# Patient Record
Sex: MALE | Race: WHITE | NOT HISPANIC OR LATINO | Employment: OTHER | URBAN - METROPOLITAN AREA
[De-identification: names, ages, dates, MRNs, and addresses within clinical notes are randomized per-mention and may not be internally consistent; named-entity substitution may affect disease eponyms.]

---

## 2017-01-03 ENCOUNTER — OFFICE VISIT (OUTPATIENT)
Dept: UROLOGY | Facility: CLINIC | Age: 82
End: 2017-01-03
Payer: MEDICARE

## 2017-01-03 VITALS — HEIGHT: 66 IN | WEIGHT: 200 LBS | BODY MASS INDEX: 32.14 KG/M2

## 2017-01-03 DIAGNOSIS — N30.00 ACUTE CYSTITIS WITHOUT HEMATURIA: ICD-10-CM

## 2017-01-03 DIAGNOSIS — D49.4 BLADDER TUMOR: Primary | ICD-10-CM

## 2017-01-03 LAB
BACTERIA #/AREA URNS AUTO: ABNORMAL /HPF
BILIRUB UR QL STRIP: NEGATIVE
CLARITY UR REFRACT.AUTO: CLEAR
COLOR UR AUTO: YELLOW
GLUCOSE UR QL STRIP: NEGATIVE
HGB UR QL STRIP: ABNORMAL
HYALINE CASTS UR QL AUTO: 1 /LPF
KETONES UR QL STRIP: NEGATIVE
LEUKOCYTE ESTERASE UR QL STRIP: ABNORMAL
MICROSCOPIC COMMENT: ABNORMAL
NITRITE UR QL STRIP: NEGATIVE
PH UR STRIP: 6 [PH] (ref 5–8)
PROT UR QL STRIP: NEGATIVE
RBC #/AREA URNS AUTO: 2 /HPF (ref 0–4)
SP GR UR STRIP: 1.01 (ref 1–1.03)
SQUAMOUS #/AREA URNS AUTO: 1 /HPF
URN SPEC COLLECT METH UR: ABNORMAL
UROBILINOGEN UR STRIP-ACNC: NEGATIVE EU/DL
WBC #/AREA URNS AUTO: 37 /HPF (ref 0–5)

## 2017-01-03 PROCEDURE — 87086 URINE CULTURE/COLONY COUNT: CPT

## 2017-01-03 PROCEDURE — 81001 URINALYSIS AUTO W/SCOPE: CPT

## 2017-01-03 PROCEDURE — 99213 OFFICE O/P EST LOW 20 MIN: CPT | Mod: PBBFAC,PO | Performed by: UROLOGY

## 2017-01-03 PROCEDURE — 99999 PR PBB SHADOW E&M-EST. PATIENT-LVL III: CPT | Mod: PBBFAC,,, | Performed by: UROLOGY

## 2017-01-03 PROCEDURE — 99214 OFFICE O/P EST MOD 30 MIN: CPT | Mod: S$PBB,,, | Performed by: UROLOGY

## 2017-01-03 NOTE — PROGRESS NOTES
Subjective:       Patient ID: Bjorn Gupta is a 89 y.o. male.    Chief Complaint: Hospital Follow Up    HPI   Patient is a 89 y.o. male who is established to our clinic and was initially referred by their urologist Dr. Saucedo for evaluation of bladder cancer.     The patient underwent transurethral resection of the bladder tumor by Dr. Saucedo on November 22, 2016.  Pathology from that was again reviewed with the patient.  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  The patient was initially counseled on the fact that he is intermediate risk and based on current guidelines for non-muscle invasive bladder cancer, he would best be treated with repeat resection given that T1 stage of his initial resection pathology.    However, given the patient's age and a very recent event of sepsis requiring IV and subsequently long course of oral antibiotics, the family has elected to forego repeat resection.  In addition, they are not interested in an induction course of BCG.  They understand that this is against guideline recommendations and that this may predispose him to recurrence or progression of his cancer.      Review of Systems    +recent UTI, currently -fevers    Objective:      Physical Exam   Constitutional: He is oriented to person, place, and time. He appears well-developed and well-nourished. No distress.   HENT:   Head: Normocephalic and atraumatic.   Eyes: No scleral icterus.   Neck: No tracheal deviation present.   Pulmonary/Chest: Effort normal. No respiratory distress.   Neurological: He is alert and oriented to person, place, and time.   Psychiatric: He has a normal mood and affect. His behavior is normal. Judgment and thought content normal.         Assessment:       1. Bladder tumor    2. Acute cystitis without hematuria        Plan:       -ua, urine culture today--I will call them with the results if it is abnormal and requiring continued antibiotics.  I explained to them that he does not  require urologic follow-up for management of urinary tract infections.  This can be managed closer to their home with his primary care physician moving forward.    -family has decided against BCG or repeat TURBT.  However, they will discuss surveillance.  I discussed that at his age, early detection of a small recurrence of bladder cancer which is likely to occur, would be much more easily managed then if I recurrences missed until the patient becomes symptomatic.  I discussed the recommended surveillance schedule based on the non-muscle invasive bladder cancer guidelines.  If his initial postresection cystoscopy was negative, he would undergo cystoscopy every 3-6 months for the first 2 years followed by every 6-12 months for years 3 and 4.  Beyond that it would be an annual cystoscopy if no recurrence is found.  -We will call the office and notify us of their decision to undergo or not undergo surveillance cystoscopy.

## 2017-01-04 ENCOUNTER — OFFICE VISIT (OUTPATIENT)
Dept: INTERNAL MEDICINE | Facility: CLINIC | Age: 82
End: 2017-01-04
Payer: MEDICARE

## 2017-01-04 VITALS
RESPIRATION RATE: 14 BRPM | WEIGHT: 186.5 LBS | HEART RATE: 76 BPM | HEIGHT: 66 IN | BODY MASS INDEX: 29.97 KG/M2 | DIASTOLIC BLOOD PRESSURE: 68 MMHG | OXYGEN SATURATION: 97 % | SYSTOLIC BLOOD PRESSURE: 116 MMHG

## 2017-01-04 DIAGNOSIS — R78.81 GRAM-NEGATIVE BACTEREMIA: Primary | ICD-10-CM

## 2017-01-04 DIAGNOSIS — N39.0 E. COLI UTI: ICD-10-CM

## 2017-01-04 DIAGNOSIS — B96.20 E. COLI UTI: ICD-10-CM

## 2017-01-04 LAB — BACTERIA UR CULT: NORMAL

## 2017-01-04 PROCEDURE — 99999 PR PBB SHADOW E&M-EST. PATIENT-LVL III: CPT | Mod: PBBFAC,,, | Performed by: INTERNAL MEDICINE

## 2017-01-04 PROCEDURE — 99213 OFFICE O/P EST LOW 20 MIN: CPT | Mod: PBBFAC | Performed by: INTERNAL MEDICINE

## 2017-01-04 PROCEDURE — 99213 OFFICE O/P EST LOW 20 MIN: CPT | Mod: S$PBB,,, | Performed by: INTERNAL MEDICINE

## 2017-01-04 RX ORDER — CIPROFLOXACIN 500 MG/1
500 TABLET ORAL 2 TIMES DAILY
Qty: 28 TABLET | Refills: 0 | Status: SHIPPED | OUTPATIENT
Start: 2017-01-04 | End: 2017-01-18

## 2017-01-04 NOTE — MR AVS SNAPSHOT
[facility] Internal Medicine  [address] [facility] [address]  Phone: [phone]  Fax: [phone]                  Bjorn Gupta   2017 8:30 AM   Office Visit    Description:  Male : 1925   Provider:  Arjun Lund MD   Department:  [facility] Internal Medicine           Reason for Visit     Blood Infection           Diagnoses this Visit        Comments    Gram-negative bacteremia    -  Primary     E. coli UTI                To Do List           Future Appointments        Provider Department Dept Phone    3/2/2017 10:30 AM Arjun Lund MD [facility] Internal Medicine [phone]      Goals (5 Years of Data)     None      Follow-Up and Disposition     Return in about 2 weeks (around 2017).       These Medications        Disp Refills Start End    ciprofloxacin HCl (CIPRO) 500 MG tablet 28 tablet 0 2017    Take 1 tablet (500 mg total) by mouth 2 (two) times daily. - Oral    Pharmacy: [address] [address] [facility] [address]  #: [phone]         [facility] On Call     [facility] On Call Nurse Care Line -  Assistance  Registered nurses in the [facility] On Call Center provide clinical advisement, health education, appointment booking, and other advisory services.  Call for this free service at [phone].             Medications           Message regarding Medications     Verify the changes and/or additions to your medication regime listed below are the same as discussed with your clinician today.  If any of these changes or additions are incorrect, please notify your healthcare provider.             Verify that the below list of medications is an accurate representation of the medications you are currently taking.  If none reported, the list may be blank. If incorrect, please contact your healthcare provider. Carry this list with you in case of emergency.           Current Medications     ALPHAGAN P 0.1 % Drop Place 1 drop into the  "right eye 3 (three) times daily.     ascorbic acid (VITAMIN C) 500 MG tablet Take 2 tablets by mouth Daily.    aspirin 81 mg Tab Take 1 tablet by mouth Daily.    cholecalciferol, vitamin D3, (VITAMIN D3) 400 unit Tab Take 1 tablet by mouth Daily.    ciprofloxacin HCl (CIPRO) 500 MG tablet Take 1 tablet (500 mg total) by mouth 2 (two) times daily.    doxazosin (CARDURA) 2 MG tablet Take 1 tablet (2 mg total) by mouth once daily.    furosemide (LASIX) 20 MG tablet Take 1 tablet (20 mg total) by mouth once daily.    metoprolol succinate (TOPROL-XL) 50 MG 24 hr tablet Take 1 tablet (50 mg total) by mouth once daily.    omeprazole (PRILOSEC OTC) 20 MG tablet Take 1 tablet by mouth Daily.    rosuvastatin (CRESTOR) 20 MG tablet Take 20 mg by mouth once daily.           Clinical Reference Information           Vital Signs - Last Recorded  Most recent update: 1/4/2017  8:53 AM by Jennifer Edwards MA    BP Pulse Resp Ht Wt SpO2    116/68 76 14 5' 6" (1.676 m) 84.6 kg (186 lb 8.2 oz) 97%    BMI                30.1 kg/m2          Blood Pressure          Most Recent Value    BP  116/68      Allergies as of 1/4/2017     Wasp Sting [Allergen Ext-venom-honey Bee]    Codeine      Immunizations Administered on Date of Encounter - 1/4/2017     None      Orders Placed During Today's Visit      Normal Orders This Visit    Urine culture     Future Labs/Procedures Expected by Expires    Urinalysis  1/4/2017 1/5/2017      "

## 2017-01-04 NOTE — PROGRESS NOTES
"Subjective:       Patient ID: Bjorn Gupta is a 89 y.o. male.    Chief Complaint: Blood Infection (HOSPITAL FOLLOW UP)    HPI Comments:  is a 89 y/o healthy male for his advanced age, still ambulatory at home, who is s/p transurethral resection of low-grade papillary T1 urothelial carcinoma bladder tumor by Dr. Saucedo on November 22, 2016, had a Staph epi UTI on 11/28/16, completed treatment with doxycycline on 12/6/16, was in his usual state of health until yesterday, presents to the ED with fever of 102.1, malaise, increasing fatigue, and just "not being myself". In the ED he was found to be in sepsis with high fever and tachycardia, and new onset A.Fib with normal ventricular response. He received rocephin for UTI today, urine cultures obtained. Admitted for sepsis due to UTI.       E coli UTI; bactremia ; sepsis     Discharged on cipro.      Review of Systems   Constitutional: Negative for chills and fever.   HENT: Negative for congestion, postnasal drip and sore throat.    Eyes: Negative for photophobia.   Respiratory: Negative for chest tightness and shortness of breath.    Cardiovascular: Negative for chest pain.   Gastrointestinal: Negative for abdominal distention, abdominal pain, blood in stool and vomiting.   Genitourinary: Negative for dysuria, flank pain and hematuria.   Musculoskeletal: Negative for back pain.   Skin: Negative for pallor.   Neurological: Negative for dizziness, seizures, facial asymmetry, speech difficulty and numbness.   Hematological: Does not bruise/bleed easily.   Psychiatric/Behavioral: Negative for agitation and suicidal ideas. The patient is not nervous/anxious.        Objective:      Physical Exam   Constitutional: He is oriented to person, place, and time. He appears well-developed and well-nourished.   HENT:   Head: Normocephalic and atraumatic.   Nose: Nose normal.   Mouth/Throat: Oropharynx is clear and moist.   Eyes: Conjunctivae and EOM are normal. Pupils " are equal, round, and reactive to light.   Neck: Normal range of motion. Neck supple. No JVD present. No thyromegaly present.   Cardiovascular: Normal rate, regular rhythm, normal heart sounds and intact distal pulses.    Pulmonary/Chest: Effort normal and breath sounds normal.   Abdominal: Soft. Bowel sounds are normal. He exhibits no distension and no mass. There is no tenderness. There is no guarding.   Musculoskeletal: Normal range of motion.   Bilateral  Edema legs   Lymphadenopathy:     He has no cervical adenopathy.   Neurological: He is alert and oriented to person, place, and time. He has normal reflexes. No cranial nerve deficit.   Skin: Skin is warm and dry. No rash noted. No pallor.   Psychiatric: He has a normal mood and affect.   Nursing note and vitals reviewed.      Assessment:       1. Gram-negative bacteremia    2. E. coli UTI        Plan:   Bjorn was seen today for blood infection.    Diagnoses and all orders for this visit:    Gram-negative bacteremia  -     ciprofloxacin HCl (CIPRO) 500 MG tablet; Take 1 tablet (500 mg total) by mouth 2 (two) times daily.  -     Urinalysis; Future  -     Urine culture    E. coli UTI  -     ciprofloxacin HCl (CIPRO) 500 MG tablet; Take 1 tablet (500 mg total) by mouth 2 (two) times daily.  -     Urinalysis; Future  -     Urine culture      Seen urologist   His UA ; shows ; pyuria   Family not willing to do urology procedures.

## 2017-01-12 ENCOUNTER — TELEPHONE (OUTPATIENT)
Dept: INTERNAL MEDICINE | Facility: CLINIC | Age: 82
End: 2017-01-12

## 2017-01-12 NOTE — TELEPHONE ENCOUNTER
----- Message from Sydney Painter sent at 2017 10:47 AM CST -----  Contact: eric daughter  Bjorn Gupta  MRN: [id number]  : 1925  PCP: Arjun Lund  Home Phone      [phone]  Work Phone      Not on file.  Mobile          [phone]      MESSAGE: have a form for handicap tag-------[phone]

## 2017-01-16 ENCOUNTER — LAB VISIT (OUTPATIENT)
Dept: LAB | Facility: HOSPITAL | Age: 82
End: 2017-01-16
Attending: INTERNAL MEDICINE
Payer: MEDICARE

## 2017-01-16 DIAGNOSIS — R78.81 GRAM-NEGATIVE BACTEREMIA: ICD-10-CM

## 2017-01-16 DIAGNOSIS — N39.0 E. COLI UTI: ICD-10-CM

## 2017-01-16 DIAGNOSIS — B96.20 E. COLI UTI: ICD-10-CM

## 2017-01-16 LAB
BACTERIA #/AREA URNS HPF: NORMAL /HPF
BILIRUB UR QL STRIP: NEGATIVE
CLARITY UR: CLEAR
COLOR UR: YELLOW
GLUCOSE UR QL STRIP: NEGATIVE
HGB UR QL STRIP: ABNORMAL
KETONES UR QL STRIP: NEGATIVE
LEUKOCYTE ESTERASE UR QL STRIP: ABNORMAL
MICROSCOPIC COMMENT: NORMAL
NITRITE UR QL STRIP: NEGATIVE
PH UR STRIP: 7 [PH] (ref 5–8)
PROT UR QL STRIP: NEGATIVE
RBC #/AREA URNS HPF: 3 /HPF (ref 0–4)
SP GR UR STRIP: 1.01 (ref 1–1.03)
SQUAMOUS #/AREA URNS HPF: 0 /HPF
URN SPEC COLLECT METH UR: ABNORMAL
UROBILINOGEN UR STRIP-ACNC: NEGATIVE EU/DL
WBC #/AREA URNS HPF: 1 /HPF (ref 0–5)

## 2017-01-16 PROCEDURE — 81000 URINALYSIS NONAUTO W/SCOPE: CPT

## 2017-01-16 PROCEDURE — 87086 URINE CULTURE/COLONY COUNT: CPT

## 2017-01-17 LAB — BACTERIA UR CULT: NORMAL

## 2017-01-19 ENCOUNTER — OFFICE VISIT (OUTPATIENT)
Dept: INTERNAL MEDICINE | Facility: CLINIC | Age: 82
End: 2017-01-19
Payer: MEDICARE

## 2017-01-19 VITALS
RESPIRATION RATE: 14 BRPM | BODY MASS INDEX: 29.83 KG/M2 | OXYGEN SATURATION: 98 % | WEIGHT: 185.63 LBS | SYSTOLIC BLOOD PRESSURE: 122 MMHG | HEART RATE: 63 BPM | HEIGHT: 66 IN | DIASTOLIC BLOOD PRESSURE: 62 MMHG

## 2017-01-19 DIAGNOSIS — N30.00 ACUTE CYSTITIS WITHOUT HEMATURIA: Primary | ICD-10-CM

## 2017-01-19 PROCEDURE — 99999 PR PBB SHADOW E&M-EST. PATIENT-LVL III: CPT | Mod: PBBFAC,,, | Performed by: INTERNAL MEDICINE

## 2017-01-19 PROCEDURE — 99213 OFFICE O/P EST LOW 20 MIN: CPT | Mod: S$PBB,,, | Performed by: INTERNAL MEDICINE

## 2017-01-19 PROCEDURE — 99213 OFFICE O/P EST LOW 20 MIN: CPT | Mod: PBBFAC | Performed by: INTERNAL MEDICINE

## 2017-01-19 NOTE — PROGRESS NOTES
Subjective:       Patient ID: Bjorn Gupta is a 89 y.o. male.    Chief Complaint: Gram negative bacteremia (2 week follow up)    HPI Comments: Bjorn Gupta is a 89 y.o. male  Here for follow up for UTI/urosepsis  Finishing his cipro   Reports no fevers; no chills,  No hematuris    Review of Systems   Constitutional: Negative for chills and fever.   HENT: Negative for congestion, postnasal drip and sore throat.    Eyes: Negative for photophobia.   Respiratory: Negative for chest tightness and shortness of breath.    Cardiovascular: Negative for chest pain.   Gastrointestinal: Negative for abdominal distention, abdominal pain, blood in stool and vomiting.   Genitourinary: Negative for dysuria, flank pain and hematuria.   Musculoskeletal: Negative for back pain.   Skin: Negative for pallor.   Neurological: Negative for dizziness, seizures, facial asymmetry, speech difficulty and numbness.   Hematological: Does not bruise/bleed easily.   Psychiatric/Behavioral: Negative for agitation and suicidal ideas. The patient is not nervous/anxious.        Objective:      Physical Exam   Constitutional: He is oriented to person, place, and time. He appears well-developed and well-nourished.   HENT:   Head: Normocephalic and atraumatic.   Nose: Nose normal.   Mouth/Throat: Oropharynx is clear and moist.   Eyes: Conjunctivae and EOM are normal. Pupils are equal, round, and reactive to light.   Neck: Normal range of motion. Neck supple. No JVD present. No thyromegaly present.   Cardiovascular: Normal rate, regular rhythm, normal heart sounds and intact distal pulses.    Pulmonary/Chest: Effort normal and breath sounds normal.   Abdominal: Soft. Bowel sounds are normal. He exhibits no distension and no mass. There is no tenderness. There is no guarding.   Musculoskeletal: Normal range of motion.   Bilateral  Edema legs   Lymphadenopathy:     He has no cervical adenopathy.   Neurological: He is alert and oriented to person,  place, and time. He has normal reflexes. No cranial nerve deficit.   Skin: Skin is warm and dry. No rash noted. No pallor.   Psychiatric: He has a normal mood and affect.   Nursing note and vitals reviewed.      Assessment:       1. Acute cystitis without hematuria        Plan:   Bjorn was seen today for gram negative bacteremia.    Diagnoses and all orders for this visit:    Acute cystitis without hematuria  Urine checked on 1/16 ; no uti  Finish cipro

## 2017-01-19 NOTE — MR AVS SNAPSHOT
[facility] Internal Medicine  [address] [facility] [address]  Phone: [phone]  Fax: [phone]                  Bjorn Gupta   2017 10:45 AM   Office Visit    Description:  Male : 1925   Provider:  Arjun Lund MD   Department:  [facility] Internal Medicine           Reason for Visit     Gram negative bacteremia           Diagnoses this Visit        Comments    Acute cystitis without hematuria    -  Primary            To Do List           Future Appointments        Provider Department Dept Phone    3/2/2017 10:30 AM Arjun Lund MD [facility] Internal Medicine [phone]      Goals (5 Years of Data)     None      Follow-Up and Disposition     Return if symptoms worsen or fail to improve.      [facility] On Call     [facility] On Call Nurse Care Line -  Assistance  Registered nurses in the [facility] On Call Center provide clinical advisement, health education, appointment booking, and other advisory services.  Call for this free service at [phone].             Medications           Message regarding Medications     Verify the changes and/or additions to your medication regime listed below are the same as discussed with your clinician today.  If any of these changes or additions are incorrect, please notify your healthcare provider.             Verify that the below list of medications is an accurate representation of the medications you are currently taking.  If none reported, the list may be blank. If incorrect, please contact your healthcare provider. Carry this list with you in case of emergency.           Current Medications     ALPHAGAN P 0.1 % Drop Place 1 drop into the right eye 3 (three) times daily.     ascorbic acid (VITAMIN C) 500 MG tablet Take 2 tablets by mouth Daily.    aspirin 81 mg Tab Take 1 tablet by mouth Daily.    cholecalciferol, vitamin D3, (VITAMIN D3) 400 unit Tab Take 1 tablet by mouth Daily.    doxazosin (CARDURA) 2 MG tablet Take 1 tablet  "(2 mg total) by mouth once daily.    furosemide (LASIX) 20 MG tablet Take 1 tablet (20 mg total) by mouth once daily.    metoprolol succinate (TOPROL-XL) 50 MG 24 hr tablet Take 1 tablet (50 mg total) by mouth once daily.    omeprazole (PRILOSEC OTC) 20 MG tablet Take 1 tablet by mouth Daily.    rosuvastatin (CRESTOR) 20 MG tablet Take 20 mg by mouth once daily.           Clinical Reference Information           Vital Signs - Last Recorded  Most recent update: 1/19/2017 10:46 AM by Jennifer Edwards MA    BP Pulse Resp Ht Wt SpO2    122/62 63 14 5' 6" (1.676 m) 84.2 kg (185 lb 10 oz) 98%    BMI                29.96 kg/m2          Blood Pressure          Most Recent Value    BP  122/62      Allergies as of 1/19/2017     Wasp Sting [Allergen Ext-venom-honey Bee]    Codeine      Immunizations Administered on Date of Encounter - 1/19/2017     None      "

## 2017-02-23 ENCOUNTER — OFFICE VISIT (OUTPATIENT)
Dept: INTERNAL MEDICINE | Facility: CLINIC | Age: 82
End: 2017-02-23
Payer: MEDICARE

## 2017-02-23 VITALS
HEIGHT: 66 IN | WEIGHT: 191.81 LBS | HEART RATE: 85 BPM | RESPIRATION RATE: 18 BRPM | DIASTOLIC BLOOD PRESSURE: 80 MMHG | TEMPERATURE: 99 F | OXYGEN SATURATION: 93 % | SYSTOLIC BLOOD PRESSURE: 144 MMHG | BODY MASS INDEX: 30.82 KG/M2

## 2017-02-23 DIAGNOSIS — J20.9 ACUTE BRONCHITIS, UNSPECIFIED ORGANISM: ICD-10-CM

## 2017-02-23 DIAGNOSIS — J01.10 ACUTE NON-RECURRENT FRONTAL SINUSITIS: Primary | ICD-10-CM

## 2017-02-23 PROCEDURE — 99213 OFFICE O/P EST LOW 20 MIN: CPT | Mod: PBBFAC | Performed by: NURSE PRACTITIONER

## 2017-02-23 PROCEDURE — 96372 THER/PROPH/DIAG INJ SC/IM: CPT | Mod: PBBFAC

## 2017-02-23 PROCEDURE — 99213 OFFICE O/P EST LOW 20 MIN: CPT | Mod: S$PBB,,, | Performed by: NURSE PRACTITIONER

## 2017-02-23 PROCEDURE — 99999 PR PBB SHADOW E&M-EST. PATIENT-LVL III: CPT | Mod: PBBFAC,,, | Performed by: NURSE PRACTITIONER

## 2017-02-23 RX ORDER — METHYLPREDNISOLONE ACETATE 80 MG/ML
80 INJECTION, SUSPENSION INTRA-ARTICULAR; INTRALESIONAL; INTRAMUSCULAR; SOFT TISSUE
Status: COMPLETED | OUTPATIENT
Start: 2017-02-23 | End: 2017-02-23

## 2017-02-23 RX ORDER — DOXYCYCLINE 100 MG/1
100 CAPSULE ORAL 2 TIMES DAILY
Qty: 20 CAPSULE | Refills: 0 | Status: SHIPPED | OUTPATIENT
Start: 2017-02-23 | End: 2017-03-05

## 2017-02-23 RX ADMIN — METHYLPREDNISOLONE ACETATE 80 MG: 80 INJECTION, SUSPENSION INTRA-ARTICULAR; INTRALESIONAL; INTRAMUSCULAR; SOFT TISSUE at 02:02

## 2017-02-23 NOTE — MR AVS SNAPSHOT
[facility]  [address] [facility] [address]  Phone: [phone]  Fax: [phone]                  Bjorn Gupta   2017 1:30 PM   Office Visit    Description:  Male : 1925   Provider:  Anila Roberson NP   [facility]           Reason for Visit     Cough     Nasal Congestion           Diagnoses this Visit        Comments    Acute non-recurrent frontal sinusitis    -  Primary     Acute bronchitis, unspecified organism                To Do List           Future Appointments        Provider Department Dept Phone    3/2/2017 10:30 AM Arjun Lund MD [facility] [phone]      Goals (5 Years of Data)     None      Follow-Up and Disposition     Return if symptoms worsen or fail to improve.       These Medications        Disp Refills Start End    doxycycline (VIBRAMYCIN) 100 MG Cap 20 capsule 0 2017 3/5/2017    Take 1 capsule (100 mg total) by mouth 2 (two) times daily. - Oral    Pharmacy: [facility] [address] [place] [address] [facility] [address] Ph #: [phone]         [facility] On Call     [facility] On Call Nurse Care Line -  Assistance  Registered nurses in the [facility] On Call Center provide clinical advisement, health education, appointment booking, and other advisory services.  Call for this free service at [phone].             Medications           Message regarding Medications     Verify the changes and/or additions to your medication regime listed below are the same as discussed with your clinician today.  If any of these changes or additions are incorrect, please notify your healthcare provider.        START taking these NEW medications        Refills    doxycycline (VIBRAMYCIN) 100 MG Cap 0    Sig: Take 1 capsule (100 mg total) by mouth 2 (two) times daily.    Class: Normal    Route: Oral      These medications were administered today        Dose Freq    methylPREDNISolone acetate injection  "80 mg 80 mg Clinic/HOD 1 time    Sig: Inject 1 mL (80 mg total) into the muscle one time.    Class: Normal    Route: Intramuscular           Verify that the below list of medications is an accurate representation of the medications you are currently taking.  If none reported, the list may be blank. If incorrect, please contact your healthcare provider. Carry this list with you in case of emergency.           Current Medications     ALPHAGAN P 0.1 % Drop Place 1 drop into the right eye 3 (three) times daily.     ascorbic acid (VITAMIN C) 500 MG tablet Take 2 tablets by mouth Daily.    aspirin 81 mg Tab Take 1 tablet by mouth Daily.    cholecalciferol, vitamin D3, (VITAMIN D3) 400 unit Tab Take 1 tablet by mouth Daily.    doxazosin (CARDURA) 2 MG tablet Take 1 tablet (2 mg total) by mouth once daily.    furosemide (LASIX) 20 MG tablet Take 1 tablet (20 mg total) by mouth once daily.    metoprolol succinate (TOPROL-XL) 50 MG 24 hr tablet Take 1 tablet (50 mg total) by mouth once daily.    omeprazole (PRILOSEC OTC) 20 MG tablet Take 1 tablet by mouth Daily.    rosuvastatin (CRESTOR) 20 MG tablet Take 20 mg by mouth once daily.    doxycycline (VIBRAMYCIN) 100 MG Cap Take 1 capsule (100 mg total) by mouth 2 (two) times daily.           Clinical Reference Information           Your Vitals Were     BP Pulse Temp Resp Height Weight    144/80 85 99.1 °F (37.3 °C) (Tympanic) 18 5' 6" (1.676 m) 87 kg (191 lb 12.8 oz)    SpO2 BMI             93% 30.96 kg/m2         Blood Pressure          Most Recent Value    BP  (!)  144/80      Allergies as of 2/23/2017     Wasp Sting [Allergen Ext-venom-honey Bee]    Codeine      Immunizations Administered on Date of Encounter - 2/23/2017     None      [facility] Sign-Up     Activating your [facility] account is as easy as 1-2-3!     1) Visit [email or web address], select Sign Up Now, enter this activation code and your date of birth, then select Next.  Activation code not generated  Current Patient " Portal Status: Account disabled      2) Create a username and password to use when you visit [facility] in the future and select a security question in case you lose your password and select Next.    3) Enter your e-mail address and click Sign Up!    Additional Information  If you have questions, please e-mail [email or web address] or call [phone] to talk to our [employer] [employer] [facility] staff. Remember, [facility] is NOT to be used for urgent needs. For medical emergencies, dial 911.         Language Assistance Services     ATTENTION: Language assistance services are available, free of charge. Please call [phone].      ATENCIÓN: Si habla bucky, tiene a duke disposición servicios gratuitos de asistencia lingüística. [place] al [phone].     CHÚ Ý: N?u b?n nói Ti?ng Vi?t, có các d?ch v? h? tr? ngôn ng? mi?n phí dành cho b?n. G?i s? [phone].         [facility] Internal Medicine complies with applicable Federal civil rights laws and does not discriminate on the basis of race, color, national origin, age, disability, or sex.

## 2017-02-23 NOTE — PROGRESS NOTES
Subjective:       Patient ID: Bjorn Gupta is a 89 y.o. male.    Chief Complaint: Cough (post nasal drip) and Nasal Congestion    HPI: pt new to me but known to [facility] presents with c/o nasal congestion x 3-4 days. Reports was wet and now drying up. Reports taking coriciden hbp. Reports low grade temp. Has been sob yesterday.     Review of Systems   Constitutional: Positive for fatigue. Negative for chills, diaphoresis and fever.   HENT: Positive for congestion, postnasal drip and sore throat. Negative for rhinorrhea, sinus pressure and sneezing.    Respiratory: Positive for cough and shortness of breath (with exertion). Negative for wheezing.    Cardiovascular: Negative for chest pain.   Gastrointestinal: Negative for abdominal distention, abdominal pain, constipation, diarrhea, nausea and vomiting.   Musculoskeletal: Negative for arthralgias, back pain and myalgias.   Neurological: Negative for headaches.   Psychiatric/Behavioral: Negative for sleep disturbance.       Objective:      Physical Exam   Constitutional: He is oriented to person, place, and time. He appears well-developed and well-nourished.   HENT:   Head: Normocephalic and atraumatic.   Right Ear: External ear normal.   Left Ear: External ear normal.   Nose: Nose normal.   Mouth/Throat: Oropharyngeal exudate present.   Cardiovascular: Normal rate, regular rhythm and normal heart sounds.    Pulmonary/Chest: Effort normal.   Diminished but clear   Abdominal: Soft. Bowel sounds are normal.   Musculoskeletal: He exhibits no edema.   Neurological: He is alert and oriented to person, place, and time.   Skin: Skin is warm and dry. No pallor.   Psychiatric: He has a normal mood and affect. His behavior is normal. Judgment and thought content normal.   Nursing note and vitals reviewed.      Assessment:       1. Acute non-recurrent frontal sinusitis    2. Acute bronchitis, unspecified organism        Plan:   1. Acute non-recurrent frontal sinusitis    -  methylPREDNISolone acetate injection 80 mg; Inject 1 mL (80 mg total) into the muscle one time.  - doxycycline (VIBRAMYCIN) 100 MG Cap; Take 1 capsule (100 mg total) by mouth 2 (two) times daily.  Dispense: 20 capsule; Refill: 0    2. Acute bronchitis, unspecified organism    - methylPREDNISolone acetate injection 80 mg; Inject 1 mL (80 mg total) into the muscle one time.  - doxycycline (VIBRAMYCIN) 100 MG Cap; Take 1 capsule (100 mg total) by mouth 2 (two) times daily.  Dispense: 20 capsule; Refill: 0

## 2017-03-02 ENCOUNTER — OFFICE VISIT (OUTPATIENT)
Dept: INTERNAL MEDICINE | Facility: CLINIC | Age: 82
End: 2017-03-02
Payer: MEDICARE

## 2017-03-02 VITALS
HEART RATE: 75 BPM | WEIGHT: 187.63 LBS | DIASTOLIC BLOOD PRESSURE: 82 MMHG | SYSTOLIC BLOOD PRESSURE: 124 MMHG | BODY MASS INDEX: 30.16 KG/M2 | OXYGEN SATURATION: 94 % | RESPIRATION RATE: 18 BRPM | HEIGHT: 66 IN

## 2017-03-02 DIAGNOSIS — D69.6 THROMBOCYTOPENIA: ICD-10-CM

## 2017-03-02 DIAGNOSIS — R60.0 EDEMA OF BOTH LEGS: ICD-10-CM

## 2017-03-02 DIAGNOSIS — I10 ESSENTIAL HYPERTENSION: Primary | ICD-10-CM

## 2017-03-02 DIAGNOSIS — E78.5 HYPERLIPIDEMIA, UNSPECIFIED HYPERLIPIDEMIA TYPE: ICD-10-CM

## 2017-03-02 PROCEDURE — 1160F RVW MEDS BY RX/DR IN RCRD: CPT | Performed by: INTERNAL MEDICINE

## 2017-03-02 PROCEDURE — 1159F MED LIST DOCD IN RCRD: CPT | Performed by: INTERNAL MEDICINE

## 2017-03-02 PROCEDURE — 99213 OFFICE O/P EST LOW 20 MIN: CPT | Mod: PBBFAC | Performed by: INTERNAL MEDICINE

## 2017-03-02 PROCEDURE — 99999 PR PBB SHADOW E&M-EST. PATIENT-LVL III: CPT | Mod: PBBFAC,,, | Performed by: INTERNAL MEDICINE

## 2017-03-02 PROCEDURE — 1157F ADVNC CARE PLAN IN RCRD: CPT | Performed by: INTERNAL MEDICINE

## 2017-03-02 PROCEDURE — 99213 OFFICE O/P EST LOW 20 MIN: CPT | Mod: S$PBB | Performed by: INTERNAL MEDICINE

## 2017-03-02 RX ORDER — FUROSEMIDE 20 MG/1
20 TABLET ORAL DAILY
Qty: 30 TABLET | Refills: 5 | Status: SHIPPED | OUTPATIENT
Start: 2017-03-02 | End: 2018-01-25 | Stop reason: SDUPTHER

## 2017-03-02 RX ORDER — DOXAZOSIN 2 MG/1
2 TABLET ORAL DAILY
Qty: 30 TABLET | Refills: 5 | Status: SHIPPED | OUTPATIENT
Start: 2017-03-02 | End: 2017-09-25 | Stop reason: SDUPTHER

## 2017-03-02 RX ORDER — METOPROLOL SUCCINATE 50 MG/1
50 TABLET, EXTENDED RELEASE ORAL DAILY
Qty: 30 TABLET | Refills: 5 | Status: SHIPPED | OUTPATIENT
Start: 2017-03-02 | End: 2017-04-07 | Stop reason: SDUPTHER

## 2017-03-02 RX ORDER — ROSUVASTATIN CALCIUM 20 MG/1
20 TABLET, COATED ORAL DAILY
Qty: 30 TABLET | Refills: 5 | Status: SHIPPED | OUTPATIENT
Start: 2017-03-02 | End: 2017-04-07 | Stop reason: SDUPTHER

## 2017-03-02 NOTE — PROGRESS NOTES
Subjective:       Patient ID: Bjorn Gupta is a 89 y.o. male.    Chief Complaint: Follow-up (3 month follow-up for pancreatic lesion/edema)    HPI Comments: Bjorn Gupta is a 89 y.o. male  Here  With family about his recent CT scan  We discussed about options.  He is not wiling to do any work up.  At almost 89 yrs  i agree with him        C/o leg swelling and leg edema.  Lasix every other day.      Review of Systems   Constitutional: Negative for chills and fever.   HENT: Negative for congestion, postnasal drip and sore throat.    Eyes: Negative for photophobia.   Respiratory: Negative for chest tightness and shortness of breath.    Cardiovascular: Negative for chest pain.   Gastrointestinal: Negative for abdominal distention, abdominal pain, blood in stool and vomiting.   Genitourinary: Negative for dysuria, flank pain and hematuria.   Musculoskeletal: Negative for back pain.   Skin: Negative for pallor.   Neurological: Negative for dizziness, seizures, facial asymmetry, speech difficulty and numbness.   Hematological: Does not bruise/bleed easily.   Psychiatric/Behavioral: Negative for agitation and suicidal ideas. The patient is not nervous/anxious.        Objective:      Physical Exam   Constitutional: He is oriented to person, place, and time. He appears well-developed and well-nourished.   HENT:   Head: Normocephalic and atraumatic.   Nose: Nose normal.   Mouth/Throat: Oropharynx is clear and moist.   Eyes: Conjunctivae and EOM are normal. Pupils are equal, round, and reactive to light.   Neck: Normal range of motion. Neck supple. No JVD present. No thyromegaly present.   Cardiovascular: Normal rate, regular rhythm, normal heart sounds and intact distal pulses.    Pulmonary/Chest: Effort normal and breath sounds normal.   Abdominal: Soft. Bowel sounds are normal. He exhibits no distension and no mass. There is no tenderness. There is no guarding.   Musculoskeletal: Normal range of motion.   Bilateral   Edema legs   Lymphadenopathy:     He has no cervical adenopathy.   Neurological: He is alert and oriented to person, place, and time. He has normal reflexes. No cranial nerve deficit.   Skin: Skin is warm and dry. No rash noted. No pallor.   Psychiatric: He has a normal mood and affect.   Nursing note and vitals reviewed.      Assessment:       1. Essential hypertension    2. Edema of both legs    3. Thrombocytopenia    4. Hyperlipidemia, unspecified hyperlipidemia type        Plan:   Bjorn was seen today for follow-up.    Diagnoses and all orders for this visit:    Essential hypertension  -     doxazosin (CARDURA) 2 MG tablet; Take 1 tablet (2 mg total) by mouth once daily.  -     metoprolol succinate (TOPROL-XL) 50 MG 24 hr tablet; Take 1 tablet (50 mg total) by mouth once daily.  -     TSH; Future  -     Urinalysis; Future    Edema of both legs  -     furosemide (LASIX) 20 MG tablet; Take 1 tablet (20 mg total) by mouth once daily.    Thrombocytopenia  -     CBC auto differential; Future    Hyperlipidemia, unspecified hyperlipidemia type  -     Comprehensive metabolic panel; Future  -     Lipid panel; Future    Other orders  -     rosuvastatin (CRESTOR) 20 MG tablet; Take 1 tablet (20 mg total) by mouth once daily.

## 2017-03-02 NOTE — MR AVS SNAPSHOT
[facility]  [address] [address]  Phone: [phone]  Fax: [phone]                  Bjorn Gupta   3/2/2017 10:30 AM   Office Visit    Description:  Male : 1925   Provider:  Arjun Lund MD   Department:  [facility]           Reason for Visit     Follow-up           Diagnoses this Visit        Comments    Essential hypertension    -  Primary     Edema of both legs         Thrombocytopenia         Hyperlipidemia, unspecified hyperlipidemia type                To Do List           Goals (5 Years of Data)     None      Follow-Up and Disposition     Return in about 3 months (around 2017).       These Medications        Disp Refills Start End    doxazosin (CARDURA) 2 MG tablet 30 tablet 5 3/2/2017     Take 1 tablet (2 mg total) by mouth once daily. - Oral    Pharmacy: [place] [id number] Ph #: [phone]       furosemide (LASIX) 20 MG tablet 30 tablet 5 3/2/2017     Take 1 tablet (20 mg total) by mouth once daily. - Oral    Pharmacy: [place] [address] Ph #: [phone]       metoprolol succinate (TOPROL-XL) 50 MG 24 hr tablet 30 tablet 5 3/2/2017 3/2/2018    Take 1 tablet (50 mg total) by mouth once daily. - Oral    Pharmacy: [place] [id number] Ph #: [phone]       rosuvastatin (CRESTOR) 20 MG tablet 30 tablet 5 3/2/2017     Take 1 tablet (20 mg total) by mouth once daily. - Oral    Pharmacy: [facility] [address] [place] [address] Ph #: [phone]         [facility]     [facility] On Call Nurse Care Line -  Assistance  Registered nurses in the [facility] provide clinical advisement, health education, appointment booking, and other advisory services.  Call for this free service at [phone].             Medications           Message  regarding Medications     Verify the changes and/or additions to your medication regime listed below are the same as discussed with your clinician today.  If any of these changes or additions are incorrect, please notify your healthcare provider.        CHANGE how you are taking these medications     Start Taking Instead of    rosuvastatin (CRESTOR) 20 MG tablet rosuvastatin (CRESTOR) 20 MG tablet    Dosage:  Take 1 tablet (20 mg total) by mouth once daily. Dosage:  Take 20 mg by mouth once daily.    Reason for Change:  Reorder            Verify that the below list of medications is an accurate representation of the medications you are currently taking.  If none reported, the list may be blank. If incorrect, please contact your healthcare provider. Carry this list with you in case of emergency.           Current Medications     ALPHAGAN P 0.1 % Drop Place 1 drop into the right eye 3 (three) times daily.     ascorbic acid (VITAMIN C) 500 MG tablet Take 2 tablets by mouth Daily.    aspirin 81 mg Tab Take 1 tablet by mouth Daily.    cholecalciferol, vitamin D3, (VITAMIN D3) 400 unit Tab Take 1 tablet by mouth Daily.    doxazosin (CARDURA) 2 MG tablet Take 1 tablet (2 mg total) by mouth once daily.    doxycycline (VIBRAMYCIN) 100 MG Cap Take 1 capsule (100 mg total) by mouth 2 (two) times daily.    furosemide (LASIX) 20 MG tablet Take 1 tablet (20 mg total) by mouth once daily.    metoprolol succinate (TOPROL-XL) 50 MG 24 hr tablet Take 1 tablet (50 mg total) by mouth once daily.    omeprazole (PRILOSEC OTC) 20 MG tablet Take 1 tablet by mouth Daily.    rosuvastatin (CRESTOR) 20 MG tablet Take 1 tablet (20 mg total) by mouth once daily.           Clinical Reference Information           Your Vitals Were     BP                   124/82           Blood Pressure          Most Recent Value    BP  124/82      Allergies as of 3/2/2017     Wasp Sting [Allergen Ext-venom-honey Bee]    Codeine      Immunizations Administered on  Date of Encounter - 3/2/2017     None      Orders Placed During Today's Visit     Future Labs/Procedures Expected by Expires    CBC auto differential  5/31/2017 3/2/2018    Comprehensive metabolic panel  5/31/2017 3/2/2018    Lipid panel  5/31/2017 3/2/2018    TSH  5/31/2017 3/2/2018    Urinalysis  5/31/2017 3/2/2018      [facility] Sign-Up     Activating your [facility] account is as easy as 1-2-3!     1) Visit [email or web address], select Sign Up Now, enter this activation code and your date of birth, then select Next.  Activation code not generated  Current Patient Portal Status: Account disabled      2) Create a username and password to use when you visit [facility] in the future and select a security question in case you lose your password and select Next.    3) Enter your e-mail address and click Sign Up!    Additional Information  If you have questions, please e-mail [email or web address] or call [phone] to talk to our [facility] staff. Remember, [facility] is NOT to be used for urgent needs. For medical emergencies, dial 911.         Language Assistance Services     ATTENTION: Language assistance services are available, free of charge. Please call [phone].      ATENCIÓN: Si habla español, tiene a duke disposición servicios gratuitos de asistencia lingüística. Llame al [phone].     CHÚ Ý: N?u b?n nói Ti?ng Vi?t, có các d?ch v? h? tr? ngôn ng? mi?n phí dành cho b?n. G?i s? [phone].         [facility] complies with applicable Federal civil rights laws and does not discriminate on the basis of race, color, national origin, age, disability, or sex.

## 2017-04-07 DIAGNOSIS — I10 ESSENTIAL HYPERTENSION: ICD-10-CM

## 2017-04-10 RX ORDER — METOPROLOL SUCCINATE 50 MG/1
50 TABLET, EXTENDED RELEASE ORAL DAILY
Qty: 30 TABLET | Refills: 5 | Status: SHIPPED | OUTPATIENT
Start: 2017-04-10 | End: 2017-09-25 | Stop reason: SDUPTHER

## 2017-04-10 RX ORDER — ROSUVASTATIN CALCIUM 20 MG/1
20 TABLET, COATED ORAL DAILY
Qty: 30 TABLET | Refills: 5 | Status: SHIPPED | OUTPATIENT
Start: 2017-04-10 | End: 2017-09-25 | Stop reason: SDUPTHER

## 2017-06-09 ENCOUNTER — CLINICAL SUPPORT (OUTPATIENT)
Dept: INTERNAL MEDICINE | Facility: CLINIC | Age: 82
End: 2017-06-09
Payer: MEDICARE

## 2017-06-09 DIAGNOSIS — I10 ESSENTIAL HYPERTENSION: ICD-10-CM

## 2017-06-09 DIAGNOSIS — D69.6 THROMBOCYTOPENIA: ICD-10-CM

## 2017-06-09 DIAGNOSIS — E78.5 HYPERLIPIDEMIA, UNSPECIFIED HYPERLIPIDEMIA TYPE: ICD-10-CM

## 2017-06-09 LAB
ALBUMIN SERPL BCP-MCNC: 3.5 G/DL
ALP SERPL-CCNC: 60 U/L
ALT SERPL W/O P-5'-P-CCNC: 12 U/L
ANION GAP SERPL CALC-SCNC: 11 MMOL/L
AST SERPL-CCNC: 17 U/L
BACTERIA #/AREA URNS HPF: ABNORMAL /HPF
BASOPHILS # BLD AUTO: 0.04 K/UL
BASOPHILS NFR BLD: 1 %
BILIRUB SERPL-MCNC: 0.4 MG/DL
BILIRUB UR QL STRIP: NEGATIVE
BUN SERPL-MCNC: 17 MG/DL
CALCIUM SERPL-MCNC: 9.3 MG/DL
CHLORIDE SERPL-SCNC: 106 MMOL/L
CHOLEST/HDLC SERPL: 3.5 {RATIO}
CLARITY UR: CLEAR
CO2 SERPL-SCNC: 26 MMOL/L
COLOR UR: YELLOW
CREAT SERPL-MCNC: 1.2 MG/DL
DIFFERENTIAL METHOD: ABNORMAL
EOSINOPHIL # BLD AUTO: 0.2 K/UL
EOSINOPHIL NFR BLD: 4.7 %
ERYTHROCYTE [DISTWIDTH] IN BLOOD BY AUTOMATED COUNT: 14.9 %
EST. GFR  (AFRICAN AMERICAN): >60 ML/MIN/1.73 M^2
EST. GFR  (NON AFRICAN AMERICAN): 52 ML/MIN/1.73 M^2
GLUCOSE SERPL-MCNC: 144 MG/DL
GLUCOSE UR QL STRIP: NEGATIVE
HCT VFR BLD AUTO: 39 %
HDL/CHOLESTEROL RATIO: 28.2 %
HDLC SERPL-MCNC: 156 MG/DL
HDLC SERPL-MCNC: 44 MG/DL
HGB BLD-MCNC: 12.5 G/DL
HGB UR QL STRIP: ABNORMAL
HYALINE CASTS #/AREA URNS LPF: 1 /LPF
KETONES UR QL STRIP: NEGATIVE
LDLC SERPL CALC-MCNC: 76.4 MG/DL
LEUKOCYTE ESTERASE UR QL STRIP: ABNORMAL
LYMPHOCYTES # BLD AUTO: 1.1 K/UL
LYMPHOCYTES NFR BLD: 26.9 %
MCH RBC QN AUTO: 28.6 PG
MCHC RBC AUTO-ENTMCNC: 32.1 %
MCV RBC AUTO: 89 FL
MICROSCOPIC COMMENT: ABNORMAL
MONOCYTES # BLD AUTO: 0.6 K/UL
MONOCYTES NFR BLD: 14.4 %
NEUTROPHILS # BLD AUTO: 2.1 K/UL
NEUTROPHILS NFR BLD: 53 %
NITRITE UR QL STRIP: NEGATIVE
NONHDLC SERPL-MCNC: 112 MG/DL
PH UR STRIP: 6 [PH] (ref 5–8)
PLATELET # BLD AUTO: 150 K/UL
PMV BLD AUTO: 10.6 FL
POTASSIUM SERPL-SCNC: 4.7 MMOL/L
PROT SERPL-MCNC: 6.9 G/DL
PROT UR QL STRIP: NEGATIVE
RBC # BLD AUTO: 4.37 M/UL
RBC #/AREA URNS HPF: 4 /HPF (ref 0–4)
SODIUM SERPL-SCNC: 143 MMOL/L
SP GR UR STRIP: 1.02 (ref 1–1.03)
TRIGL SERPL-MCNC: 178 MG/DL
TSH SERPL DL<=0.005 MIU/L-ACNC: 2.49 UIU/ML
URN SPEC COLLECT METH UR: ABNORMAL
UROBILINOGEN UR STRIP-ACNC: NEGATIVE EU/DL
WBC # BLD AUTO: 4.02 K/UL
WBC #/AREA URNS HPF: 3 /HPF (ref 0–5)

## 2017-06-09 PROCEDURE — 84443 ASSAY THYROID STIM HORMONE: CPT

## 2017-06-09 PROCEDURE — 80053 COMPREHEN METABOLIC PANEL: CPT

## 2017-06-09 PROCEDURE — 80061 LIPID PANEL: CPT

## 2017-06-09 PROCEDURE — 81000 URINALYSIS NONAUTO W/SCOPE: CPT

## 2017-06-09 PROCEDURE — 36415 COLL VENOUS BLD VENIPUNCTURE: CPT | Mod: PBBFAC

## 2017-06-09 PROCEDURE — 85025 COMPLETE CBC W/AUTO DIFF WBC: CPT

## 2017-06-15 ENCOUNTER — OFFICE VISIT (OUTPATIENT)
Dept: INTERNAL MEDICINE | Facility: CLINIC | Age: 82
End: 2017-06-15
Payer: MEDICARE

## 2017-06-15 VITALS
SYSTOLIC BLOOD PRESSURE: 128 MMHG | WEIGHT: 193.13 LBS | DIASTOLIC BLOOD PRESSURE: 64 MMHG | OXYGEN SATURATION: 96 % | HEART RATE: 75 BPM | HEIGHT: 66 IN | RESPIRATION RATE: 14 BRPM | BODY MASS INDEX: 31.04 KG/M2

## 2017-06-15 DIAGNOSIS — E11.9 CONTROLLED TYPE 2 DIABETES MELLITUS WITHOUT COMPLICATION, WITHOUT LONG-TERM CURRENT USE OF INSULIN: ICD-10-CM

## 2017-06-15 DIAGNOSIS — E78.5 HYPERLIPIDEMIA, UNSPECIFIED HYPERLIPIDEMIA TYPE: ICD-10-CM

## 2017-06-15 DIAGNOSIS — D64.9 ANEMIA OF UNKNOWN ETIOLOGY: ICD-10-CM

## 2017-06-15 DIAGNOSIS — I10 ESSENTIAL HYPERTENSION: Primary | ICD-10-CM

## 2017-06-15 PROCEDURE — 99999 PR PBB SHADOW E&M-EST. PATIENT-LVL III: CPT | Mod: PBBFAC,,, | Performed by: INTERNAL MEDICINE

## 2017-06-15 PROCEDURE — 99214 OFFICE O/P EST MOD 30 MIN: CPT | Mod: S$PBB | Performed by: INTERNAL MEDICINE

## 2017-06-15 PROCEDURE — 99213 OFFICE O/P EST LOW 20 MIN: CPT | Mod: PBBFAC | Performed by: INTERNAL MEDICINE

## 2017-06-15 PROCEDURE — 1159F MED LIST DOCD IN RCRD: CPT | Performed by: INTERNAL MEDICINE

## 2017-06-15 PROCEDURE — 1157F ADVNC CARE PLAN IN RCRD: CPT | Performed by: INTERNAL MEDICINE

## 2017-06-15 NOTE — PROGRESS NOTES
Subjective:       Patient ID: Bjorn Gupta is a 89 y.o. male.    Chief Complaint: Hyperlipidemia (follow up with lab review); Hypertension; and Knee Pain (right knee)    Bjorn Gupta is a 89 y.o. male who presents for  Hypertension, and Hyperlipidemia follow up. Labs were reviewed with patient today.      Hyperlipidemia   This is a chronic problem. The current episode started more than 1 year ago. The problem is controlled. Recent lipid tests were reviewed and are low. Exacerbating diseases include obesity. Pertinent negatives include no chest pain, leg pain, myalgias or shortness of breath. Current antihyperlipidemic treatment includes statins. The current treatment provides moderate improvement of lipids. Risk factors for coronary artery disease include male sex, dyslipidemia and hypertension.   Hypertension   This is a chronic problem. The current episode started more than 1 year ago. The problem has been waxing and waning since onset. The problem is controlled. Pertinent negatives include no chest pain, palpitations or shortness of breath. Risk factors for coronary artery disease include sedentary lifestyle, obesity and dyslipidemia. Past treatments include ACE inhibitors, lifestyle changes, beta blockers and central alpha agonists. The current treatment provides moderate improvement. Hypertensive end-organ damage includes CAD/MI.   Knee Pain    Pertinent negatives include no numbness.   Medication Refill   Pertinent negatives include no abdominal pain, chest pain, chills, congestion, fever, myalgias, numbness, sore throat or vomiting.   Coronary Artery Disease   Pertinent negatives include no chest pain, chest tightness, dizziness, palpitations or shortness of breath. Risk factors include hyperlipidemia and obesity.     Review of Systems   Constitutional: Negative for chills and fever.   HENT: Negative for congestion, postnasal drip and sore throat.    Eyes: Negative for photophobia.   Respiratory:  Negative for chest tightness and shortness of breath.    Cardiovascular: Negative for chest pain and palpitations.   Gastrointestinal: Negative for abdominal distention, abdominal pain, blood in stool and vomiting.   Genitourinary: Negative for dysuria, flank pain and hematuria.   Musculoskeletal: Negative for back pain and myalgias.   Skin: Negative for pallor.   Neurological: Negative for dizziness, seizures, facial asymmetry, speech difficulty and numbness.   Hematological: Does not bruise/bleed easily.   Psychiatric/Behavioral: Negative for agitation and suicidal ideas. The patient is not nervous/anxious.        Objective:      Physical Exam   Constitutional: He is oriented to person, place, and time. He appears well-developed and well-nourished.   HENT:   Head: Normocephalic and atraumatic.   Nose: Nose normal.   Mouth/Throat: Oropharynx is clear and moist.   Eyes: Conjunctivae and EOM are normal. Pupils are equal, round, and reactive to light.   Neck: Normal range of motion. Neck supple. No JVD present. No thyromegaly present.   Cardiovascular: Normal rate, regular rhythm, normal heart sounds and intact distal pulses.    Pulmonary/Chest: Effort normal and breath sounds normal.   Abdominal: Soft. Bowel sounds are normal. He exhibits no distension and no mass. There is no tenderness. There is no guarding.   Musculoskeletal: Normal range of motion.   Bilateral  Edema legs   Lymphadenopathy:     He has no cervical adenopathy.   Neurological: He is alert and oriented to person, place, and time. He has normal reflexes. No cranial nerve deficit.   Skin: Skin is warm and dry. No rash noted. No pallor.   Psychiatric: He has a normal mood and affect.   Nursing note and vitals reviewed.      Assessment:       1. Essential hypertension    2. Controlled type 2 diabetes mellitus without complication, without long-term current use of insulin    3. Hyperlipidemia, unspecified hyperlipidemia type    4. Anemia of unknown  etiology        Plan:   Bjorn was seen today for hyperlipidemia, hypertension and knee pain.    Diagnoses and all orders for this visit:    Essential hypertension  -     CBC auto differential; Future  -     Comprehensive metabolic panel; Future  -     TSH; Future    Well controlled.  Continue same medication and dose.  1. Keep weight close to ideal body weight.   2.   Avoid high salt foods (olives, pickles, smoked meats, salted potato chips, etc.).   Do not add salt to your food at the table.   Use only small amounts of salt when cooking.   3. Begin an exercise program. Discuss with your doctor what type of exercise program would be best for you. It doesn't have to be difficult. Even brisk walking for 20 minutes three times a week is a good form of exercise.   4. Avoid medicines which contain heart stimulants. This includes many cold and sinus decongestant pills and sprays as well as diet pills. Check the warnings about hypertension on the label. Stimulants such as amphetamine or cocaine could be lethal for someone with hypertension. Never take these.    Controlled type 2 diabetes mellitus without complication, without long-term current use of insulin  -     Microalbumin/creatinine urine ratio; Future  -     Hemoglobin A1c; Future  New diagnosis;  Patient has controlled Diabetes .  We discussed about diet ;low in calories. Avoid sweats, sodas.  Also increasing activity;walking 2-3 miles a day.   gave patient  instructions about adherence to plan.  Goal of  A1c  less than 7 % stressed.  Also goal of LDL less than 70 highlighted to patient.  Hyperlipidemia, unspecified hyperlipidemia type  -     Comprehensive metabolic panel; Future  -     Lipid panel; Future  Limit the cholesterol in your diet to less than 300 mg per day.   Fats should contribute no more than 20 to 35% of your daily calories.   Less than 7 to 10% of your calories should come from saturated fat.   Avoid saturated fat products e.g., Butter, some oils,  meat, and poultry fat contain a lot of saturated fat.   Check food labels for fat and cholesterol content. Choose the foods with less fat per serving.   Limit the amount of butter and margarine you eat.   Use salad dressings and margarine made with polyunsaturated and monounsaturated fats.   Use egg whites or egg substitutes rather than whole eggs.   Replace whole-milk dairy products with nonfat or low-fat milk, cheese, spreads, and yogurt.   Eat skinless chicken, turkey, fish, and meatless entrees more often than red meat.   Choose lean cuts of meat and trim off all visible fat. Keep portion sizes moderate.   Avoid fatty desserts such as ice cream, cream-filled cakes, and cheesecakes. Choose fresh fruits, nonfat frozen yogurt, Popsicles, etc.   Reduce the amount of fried foods, vending machine food, and fast food you eat.   Eat fruits and vegetables (especially fresh fruits and leafy vegetables), beans, and whole grains daily. The fiber in these foods helps lower cholesterol.   Look for low-fat or nonfat varieties of the foods you like to eat, or look for substitutes.   You may need to exercise 60 minutes a day to prevent weight gain and 90 minutes a day to lose weight.    Anemia of unknown etiology  -     CBC auto differential; Future    Stable.

## 2017-07-26 ENCOUNTER — TELEPHONE (OUTPATIENT)
Dept: INTERNAL MEDICINE | Facility: CLINIC | Age: 82
End: 2017-07-26

## 2017-07-26 NOTE — TELEPHONE ENCOUNTER
Daughter is requesting a letter stating that this patient requires a caregiver 24 hours/day due to his medical needs. Please advise if ok for letter. Thanks.

## 2017-07-26 NOTE — TELEPHONE ENCOUNTER
----- Message from Eladia Birch sent at 2017  9:13 AM CDT -----  Contact: eric/ daughter  Bjorn Gupta  MRN: [id number]  : 1925  PCP: Arjun Lund  Home Phone      [phone]  Work Phone      Not on file.  Mobile          [phone]      MESSAGE:    Needs to discuss a letter written stating that he lives with sister in law and she can not do jury duty because he needs full care     Phone:  [phone]

## 2017-08-18 ENCOUNTER — HOSPITAL ENCOUNTER (OUTPATIENT)
Dept: RADIOLOGY | Facility: HOSPITAL | Age: 82
Discharge: HOME | End: 2017-08-18
Attending: PSYCHIATRY & NEUROLOGY
Payer: MEDICARE

## 2017-08-18 ENCOUNTER — OFFICE VISIT (OUTPATIENT)
Dept: NEUROLOGY | Facility: CLINIC | Age: 82
End: 2017-08-18
Payer: MEDICARE

## 2017-08-18 VITALS
DIASTOLIC BLOOD PRESSURE: 70 MMHG | SYSTOLIC BLOOD PRESSURE: 138 MMHG | RESPIRATION RATE: 18 BRPM | HEIGHT: 66 IN | HEART RATE: 66 BPM | WEIGHT: 199.5 LBS | BODY MASS INDEX: 32.06 KG/M2

## 2017-08-18 DIAGNOSIS — R26.9 ABNORMAL GAIT: ICD-10-CM

## 2017-08-18 DIAGNOSIS — G56.03 CARPAL TUNNEL SYNDROME, BILATERAL: Primary | ICD-10-CM

## 2017-08-18 DIAGNOSIS — G62.9 NEUROPATHY: ICD-10-CM

## 2017-08-18 DIAGNOSIS — E11.9 CONTROLLED TYPE 2 DIABETES MELLITUS WITHOUT COMPLICATION, WITHOUT LONG-TERM CURRENT USE OF INSULIN: ICD-10-CM

## 2017-08-18 DIAGNOSIS — R41.3 MEMORY LOSS: ICD-10-CM

## 2017-08-18 PROCEDURE — 70450 CT HEAD/BRAIN W/O DYE: CPT | Mod: 26,,, | Performed by: RADIOLOGY

## 2017-08-18 PROCEDURE — 99999 PR STA SHADOW: CPT | Mod: PBBFAC,,, | Performed by: PSYCHIATRY & NEUROLOGY

## 2017-08-18 PROCEDURE — 99999 PR PBB SHADOW E&M-EST. PATIENT-LVL III: CPT | Mod: PBBFAC,,, | Performed by: PSYCHIATRY & NEUROLOGY

## 2017-08-18 PROCEDURE — 70450 CT HEAD/BRAIN W/O DYE: CPT | Mod: TC

## 2017-08-18 RX ORDER — MEMANTINE HYDROCHLORIDE 5 MG/1
TABLET ORAL
Qty: 60 TABLET | Refills: 11 | Status: SHIPPED | OUTPATIENT
Start: 2017-08-18 | End: 2018-06-19 | Stop reason: SDUPTHER

## 2017-08-18 NOTE — PROGRESS NOTES
"HPI: Bjorn Gupta is a 89 y.o. male with over one year of numbness in the hands. Some degenerative changes not unexpected for age on C spine xray.2013  EMG showed: Mild Axonal polyneuropathy effecting the hands, possibly from age or from patient's history of "pre-diabetes", Superimposed Bilateral Carpal Tunnel Syndrome which is severe on the right, and Cervical radiculopathy best localizing to C6 bilaterally and C7 on the left     Patient did complete home health last year  However, he was diagnosed late in the year with bladder CA and had sepsis in 12/2016. He is living with family now. He does not drive.  Family his here today and his memory is worsening over time.   The patient does notice the forgetfulness and family states this happens multiple times daily.  Long term memory is better.  He has no agitation.  His numbness in the hands is not very bothersome. He notes some reduced  chronically  No recent falls  Family has opted for conservative measures for his CA  Review of Systems   Not reliable due to memory loss    Exam:  Gen Appearance, well developed/nourished in no apparent distress  CV: 2+ distal pulses with no edema or swelling  Neuro:  MS: Awake, alert, oriented to place, person,  Time (as In August and knows it is Friday), not fully oriented to situation. Sustains attention. Recent recall is forgotten/remote memory intact, Language is full to spontaneous speech/comprehension. Fund of Knowledge is full  CN: Optic discs are flat with normal vasculature, PERRL, Extraoccular movements and visual fields are full. Normal facial s strength,  Tongue and Palate are midline and strong. Shoulder Shrug symmetric and strong.  Motor: Normal bulk, tone, no abnormal movements. 5/5 strength bilateral upper/lower extremities with 2+ reflexes   Sensory: symmetric to  temp, and vibration, Romberg mildly positive  Cerebellar: Finger-nose,Heal-shin, Rapid alternating movements intact  Gait: Normal stance, no ataxia " "but some mild difficulty with rise from chair-- transitioning is impaired due to balance  + Tinel's sign at the wrist bilaterally.     EMG: Mild Axonal polyneuropathy effecting the hands, possibly from age or from patient's history of "pre-diabetes", Superimposed Bilateral Carpal Tunnel Syndrome which is severe on the right,  Cervical radiculopathy best localizing to C6 bilaterally and C7 on the left       Labs: Fasting glucose is still in the 110 this month  TSh, CMP, CBC from 2017 reviewed    Assessment/Plan: Bjorn Gupta is a 89 y.o. male with over one year of numbness in the hands. Some degenerative changes not unexpected for age on C spine xray.2013  EMG showed: Mild Axonal polyneuropathy effecting the hands, possibly from age or from patient's history of "pre-diabetes", Superimposed Bilateral Carpal Tunnel Syndrome which is severe on the right, and Cervical radiculopathy best localizing to C6 bilaterally and C7 on the left   I recommend:     1.  Patient continues with  numbness in the hands without  cervical radicular symptoms (he would defer C spine surgery at this point/ no active symptoms/ ). This is not responding well to brace but patient's symptoms do sound most consistent with CTS, but he declined CTR offered by orthopedist  2. He is now diagnosed with DM2. Reduced blood sugar to prevention worsening of neuropathy. Diet reviewed  3. Fall precautions reviewed- now walking with a cane. Needs a lift chair for home use.   4. Patient's memory is worsening over time. CT head and B12. Otherwise, Namenda per orders unless side effects.   He lives with family and no longer drives  RTC in 6 months              "

## 2017-09-25 DIAGNOSIS — I10 ESSENTIAL HYPERTENSION: ICD-10-CM

## 2017-09-25 RX ORDER — METOPROLOL SUCCINATE 50 MG/1
50 TABLET, EXTENDED RELEASE ORAL DAILY
Qty: 30 TABLET | Refills: 5 | Status: SHIPPED | OUTPATIENT
Start: 2017-09-25 | End: 2018-03-22 | Stop reason: SDUPTHER

## 2017-09-25 RX ORDER — ROSUVASTATIN CALCIUM 20 MG/1
20 TABLET, COATED ORAL DAILY
Qty: 30 TABLET | Refills: 5 | Status: SHIPPED | OUTPATIENT
Start: 2017-09-25 | End: 2018-03-22 | Stop reason: SDUPTHER

## 2017-09-25 RX ORDER — DOXAZOSIN 2 MG/1
2 TABLET ORAL DAILY
Qty: 30 TABLET | Refills: 5 | Status: SHIPPED | OUTPATIENT
Start: 2017-09-25 | End: 2018-03-22 | Stop reason: SDUPTHER

## 2017-09-25 NOTE — TELEPHONE ENCOUNTER
----- Message from Sydney Painter sent at 2017  2:59 PM CDT -----  Contact: eric Gupta  MRN: [id number]  : 1925  PCP: Arjun Lund  Home Phone      [phone]  Work Phone      Not on file.  Mobile          [phone]      MESSAGE: refill [facility]------rosuvastain----doxazosin---metoprolol----[phone]

## 2017-10-24 ENCOUNTER — TELEPHONE (OUTPATIENT)
Dept: INTERNAL MEDICINE | Facility: CLINIC | Age: 82
End: 2017-10-24

## 2017-10-24 NOTE — TELEPHONE ENCOUNTER
----- Message from Geraldine Fide sent at 10/24/2017  9:43 AM CDT -----  Contact: Pt's caretaker Leyda Gupta  MRN: [id number]  : 1925  PCP: Arjun Lund  Home Phone      [phone]  Work Phone      Not on file.  Mobile          [phone]      MESSAGE:  Says she has been sick for the past couple of days and was trying hard not to give him whatever she has but he's starting to show symptoms; sneezing, coughing, congestion, etc. Wants to know if there is anything she can give him. Please advise.    PHONE:  [phone]

## 2017-10-24 NOTE — TELEPHONE ENCOUNTER
Recommended OTC Zyrtec or Claritin and Robitussin for cough. Avoid DM. Will see Dr. Lund on Thursday if no better. Appt scheduled.

## 2017-10-26 ENCOUNTER — OFFICE VISIT (OUTPATIENT)
Dept: INTERNAL MEDICINE | Facility: CLINIC | Age: 82
End: 2017-10-26
Payer: MEDICARE

## 2017-10-26 VITALS
BODY MASS INDEX: 32.31 KG/M2 | OXYGEN SATURATION: 95 % | SYSTOLIC BLOOD PRESSURE: 130 MMHG | HEIGHT: 66 IN | HEART RATE: 74 BPM | WEIGHT: 201.06 LBS | DIASTOLIC BLOOD PRESSURE: 70 MMHG | RESPIRATION RATE: 14 BRPM

## 2017-10-26 DIAGNOSIS — J20.9 ACUTE BRONCHITIS, UNSPECIFIED ORGANISM: Primary | ICD-10-CM

## 2017-10-26 PROCEDURE — 99999 PR STA SHADOW: CPT | Mod: PBBFAC,,,

## 2017-10-26 PROCEDURE — 99999 PR PBB SHADOW E&M-EST. PATIENT-LVL III: CPT | Mod: PBBFAC,,, | Performed by: INTERNAL MEDICINE

## 2017-10-26 PROCEDURE — 99213 OFFICE O/P EST LOW 20 MIN: CPT | Mod: PBBFAC | Performed by: INTERNAL MEDICINE

## 2017-10-26 PROCEDURE — 99213 OFFICE O/P EST LOW 20 MIN: CPT | Mod: S$PBB | Performed by: INTERNAL MEDICINE

## 2017-10-26 PROCEDURE — 99999 PR STA SHADOW: CPT | Mod: PBBFAC,,, | Performed by: INTERNAL MEDICINE

## 2017-10-26 PROCEDURE — 96372 THER/PROPH/DIAG INJ SC/IM: CPT | Mod: PBBFAC

## 2017-10-26 RX ORDER — CODEINE PHOSPHATE AND GUAIFENESIN 10; 100 MG/5ML; MG/5ML
5 SOLUTION ORAL EVERY 6 HOURS PRN
Qty: 120 ML | Refills: 0 | Status: SHIPPED | OUTPATIENT
Start: 2017-10-26 | End: 2017-12-18 | Stop reason: ALTCHOICE

## 2017-10-26 RX ORDER — AZITHROMYCIN 250 MG/1
TABLET, FILM COATED ORAL
Qty: 6 TABLET | Refills: 0 | Status: SHIPPED | OUTPATIENT
Start: 2017-10-26 | End: 2017-12-01

## 2017-10-26 RX ORDER — METHYLPREDNISOLONE ACETATE 80 MG/ML
80 INJECTION, SUSPENSION INTRA-ARTICULAR; INTRALESIONAL; INTRAMUSCULAR; SOFT TISSUE
Status: COMPLETED | OUTPATIENT
Start: 2017-10-26 | End: 2017-10-26

## 2017-10-26 RX ADMIN — METHYLPREDNISOLONE ACETATE 80 MG: 80 INJECTION, SUSPENSION INTRA-ARTICULAR; INTRALESIONAL; INTRAMUSCULAR; SOFT TISSUE at 01:10

## 2017-10-26 NOTE — PROGRESS NOTES
Subjective:       Patient ID: Bjorn Gupta is a 89 y.o. male.    Chief Complaint: Cough and Chest Congestion    Cough   This is a new problem. The current episode started in the past 7 days. The problem has been gradually worsening. The problem occurs hourly. Associated symptoms include nasal congestion and postnasal drip. Pertinent negatives include no fever, rhinorrhea or wheezing. Treatments tried: zyrtec and robitussin  The treatment provided no relief.     Review of Systems   Constitutional: Negative for fever.   HENT: Positive for postnasal drip. Negative for rhinorrhea.    Respiratory: Positive for cough. Negative for wheezing.        Objective:      Physical Exam   Constitutional: He is oriented to person, place, and time. He appears well-developed and well-nourished.   HENT:   Head: Normocephalic and atraumatic.   Right Ear: External ear normal.   Left Ear: External ear normal.   Nose: Nose normal.   Cardiovascular: Normal rate, regular rhythm and normal heart sounds.    Pulmonary/Chest: Effort normal.   Diminished but clear   Abdominal: Soft. Bowel sounds are normal.   Musculoskeletal: He exhibits no edema.   Neurological: He is alert and oriented to person, place, and time.   Skin: Skin is warm and dry. No pallor.   Psychiatric: He has a normal mood and affect. His behavior is normal. Judgment and thought content normal.   Nursing note and vitals reviewed.      Assessment:       1. Acute bronchitis, unspecified organism        Plan:   Bjorn was seen today for cough and chest congestion.    Diagnoses and all orders for this visit:    Acute bronchitis, unspecified organism  -     methylPREDNISolone acetate injection 80 mg; Inject 1 mL (80 mg total) into the muscle one time.  -     azithromycin (Z-CRUZ) 250 MG tablet; Two today , then 1 daily  -     guaifenesin-codeine 100-10 mg/5 ml (CHERATUSSIN AC)  mg/5 mL syrup; Take 5 mLs by mouth every 6 (six) hours as needed for Cough.    if not better call  me.

## 2017-11-30 ENCOUNTER — HOSPITAL ENCOUNTER (EMERGENCY)
Facility: HOSPITAL | Age: 82
Discharge: HOME | End: 2017-11-30
Attending: EMERGENCY MEDICINE
Payer: MEDICARE

## 2017-11-30 VITALS
WEIGHT: 204 LBS | RESPIRATION RATE: 17 BRPM | SYSTOLIC BLOOD PRESSURE: 138 MMHG | BODY MASS INDEX: 32.93 KG/M2 | OXYGEN SATURATION: 99 % | DIASTOLIC BLOOD PRESSURE: 74 MMHG | HEART RATE: 82 BPM | TEMPERATURE: 98 F
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
## 2017-11-30 DIAGNOSIS — S00.83XA FACIAL CONTUSION, INITIAL ENCOUNTER: Primary | ICD-10-CM

## 2017-11-30 PROCEDURE — 99283 EMERGENCY DEPT VISIT LOW MDM: CPT

## 2017-11-30 NOTE — ED PROVIDER NOTES
Encounter Date: 11/30/2017       History     Chief Complaint   Patient presents with    Fall     This 72-year-old man rolled out of bed and struck his head on the bedside table.  He may have loss consciousness he says he woke up on the floor.  He has a large periorbital hematoma around the right eye with a few superficial lacerations.  He denies any other injuries.  He denies any neck pain          Review of patient's allergies indicates:   Allergen Reactions    Wasp sting [allergen ext-venom-honey bee] Anaphylaxis    Codeine Other (See Comments)     Other reaction(s): jittery     Past Medical History:   Diagnosis Date    AAA (abdominal aortic aneurysm)     Bladder tumor     Blind left eye     CAD (coronary artery disease)     Controlled type 2 diabetes mellitus without complication, without long-term current use of insulin 6/15/2017    Hyperlipidemia     Hypertension     Urinary tract infection      Past Surgical History:   Procedure Laterality Date    aaa stent  2004    CYSTOSCOPY  2016    TONSILLECTOMY      TRANSURETHRAL RESECTION OF PROSTATE  1996     Family History   Problem Relation Age of Onset    Prostate cancer Neg Hx     Kidney disease Neg Hx      Social History   Substance Use Topics    Smoking status: Former Smoker     Packs/day: 1.00     Years: 20.00     Types: Cigarettes     Start date: 1/1/1943     Quit date: 1/1/1963    Smokeless tobacco: Never Used    Alcohol use No     Review of Systems   All other systems reviewed and are negative.      Physical Exam     Initial Vitals [11/30/17 0157]   BP Pulse Resp Temp SpO2   (!) 156/82 72 18 (!) 95.3 °F (35.2 °C) 99 %      MAP       106.67         Physical Exam    Nursing note and vitals reviewed.  Constitutional: He appears well-developed and well-nourished.   HENT:   Head: Normocephalic.   The scalp was intact to inspection and palpation.  There is a right periorbital hematoma.  Clear nose along the upper lid.  Lids few superficial skin  avulsions.  Pupils are  reactive to light.  Left eye is blind.  The facial bones and nasal bones are intact palpation.   Eyes: Conjunctivae are normal.   Neck: Normal range of motion. Neck supple.   Cardiovascular: Normal rate and regular rhythm.   Pulmonary/Chest: Breath sounds normal.   Abdominal: Soft. Bowel sounds are normal.   Musculoskeletal: Normal range of motion.   Extremities have all range of motion without any pain or limitation.  Neurovascular exam of all extremities is normal.  The pelvis is nontender to palpation.   Neurological: He is alert and oriented to person, place, and time.   Skin: Skin is warm and dry.   Psychiatric: He has a normal mood and affect.         ED Course   Procedures  Labs Reviewed - No data to display                            ED Course      Clinical Impression:   The primary encounter diagnosis was Facial contusion, initial encounter. A diagnosis of Fall against object was also pertinent to this visit.    Disposition:   Disposition: Discharged  Condition: Stable                        Delfin Alberto MD  11/30/17 0313       Delfin Alberto MD  11/30/17 0421

## 2017-11-30 NOTE — ED TRIAGE NOTES
Patient fell out of bed and hit head on end table. Right eye lid swollen with 3 lacs noted. Rachel-orbital area swollen and purple. No active bleeding noted.

## 2017-11-30 NOTE — DISCHARGE INSTRUCTIONS
Elevate the head of the bed either by sleeping in a recliner or with extra pillows.  Check on him several times during the night.  Tylenol for pain.  Follow-up tomorrow or the next day until the office.

## 2017-12-01 ENCOUNTER — OFFICE VISIT (OUTPATIENT)
Dept: INTERNAL MEDICINE | Facility: CLINIC | Age: 82
End: 2017-12-01
Payer: MEDICARE

## 2017-12-01 VITALS
HEIGHT: 66 IN | RESPIRATION RATE: 15 BRPM | HEART RATE: 64 BPM | DIASTOLIC BLOOD PRESSURE: 66 MMHG | WEIGHT: 189.13 LBS | SYSTOLIC BLOOD PRESSURE: 126 MMHG | BODY MASS INDEX: 30.4 KG/M2

## 2017-12-01 DIAGNOSIS — H05.231 PERIORBITAL HEMATOMA OF RIGHT EYE: Primary | ICD-10-CM

## 2017-12-01 PROCEDURE — 99999 PR STA SHADOW: CPT | Mod: PBBFAC,,, | Performed by: INTERNAL MEDICINE

## 2017-12-01 PROCEDURE — 99213 OFFICE O/P EST LOW 20 MIN: CPT | Mod: PBBFAC | Performed by: INTERNAL MEDICINE

## 2017-12-01 PROCEDURE — 99999 PR PBB SHADOW E&M-EST. PATIENT-LVL III: CPT | Mod: PBBFAC,,, | Performed by: INTERNAL MEDICINE

## 2017-12-01 PROCEDURE — 99213 OFFICE O/P EST LOW 20 MIN: CPT | Mod: S$PBB | Performed by: INTERNAL MEDICINE

## 2017-12-01 NOTE — PROGRESS NOTES
Subjective:       Patient ID: Bjorn Gupta is a 89 y.o. male.    Chief Complaint: Hospital Follow Up (fall)      HPI:  Patient is new to me but known to clinic and presents for ER follow up for fall. He fell out of the bed last night and hit his head on the bedside table. Unsure if he lost consciousness. CT head and max face reports personally reviewed: right periorbital hematoma noted but no intracranial bleed. Today he reports no pain. Still with significant bruising. 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 Saw optho today as well, vision is stable.     Past Medical History:   Diagnosis Date    AAA (abdominal aortic aneurysm)     Bladder tumor     Blind left eye     CAD (coronary artery disease)     Controlled type 2 diabetes mellitus without complication, without long-term current use of insulin 6/15/2017    Hyperlipidemia     Hypertension     Urinary tract infection        Family History   Problem Relation Age of Onset    Prostate cancer Neg Hx     Kidney disease Neg Hx        Social History     Social History    Marital status:      Spouse name: N/A    Number of children: N/A    Years of education: N/A     Occupational History    Not on file.     Social History Main Topics    Smoking status: Former Smoker     Packs/day: 1.00     Years: 20.00     Types: Cigarettes     Start date: 1/1/1943     Quit date: 1/1/1963    Smokeless tobacco: Never Used    Alcohol use No    Drug use: No    Sexual activity: Not Currently      Comment:      Other Topics Concern    Not on file     Social History Narrative    No narrative on file       Review of Systems   Constitutional: Negative for activity change, fatigue, fever and unexpected weight change.   HENT: Negative for congestion, ear pain, hearing loss, rhinorrhea and sore throat.    Eyes: Negative for pain, redness and visual disturbance.   Respiratory: Negative for cough, shortness of breath and wheezing.     Cardiovascular: Negative for chest pain, palpitations and leg swelling.   Gastrointestinal: Negative for abdominal pain, constipation, diarrhea, nausea and vomiting.   Genitourinary: Negative for decreased urine volume, dysuria, frequency and urgency.   Musculoskeletal: Negative for back pain, joint swelling and neck pain.   Skin: Negative for color change, rash and wound.        Bruising, small cut left eye     Neurological: Negative for dizziness, tremors, weakness, light-headedness and headaches.         Objective:      Physical Exam   Constitutional: He is oriented to person, place, and time. He appears well-developed and well-nourished. No distress.   HENT:   Head: Normocephalic and atraumatic.   Right Ear: External ear normal.   Left Ear: External ear normal.   Eyes: Conjunctivae and EOM are normal. Pupils are equal, round, and reactive to light. Right eye exhibits no discharge. Left eye exhibits no discharge.   Neck: Neck supple. No tracheal deviation present.   Cardiovascular: Normal rate and regular rhythm.    Pulmonary/Chest: Effort normal and breath sounds normal. No respiratory distress.   Abdominal: Soft. Bowel sounds are normal. He exhibits no distension. There is no tenderness.   Neurological: He is alert and oriented to person, place, and time. No cranial nerve deficit.   Skin: Skin is warm and dry.   Significant left periorbital hematoma with small lac above left eye   Psychiatric: He has a normal mood and affect. His behavior is normal.   Vitals reviewed.      Assessment:       1. Periorbital hematoma of right eye        Plan:       Bjorn was seen today for hospital follow up.    Diagnoses and all orders for this visit:    Periorbital hematoma of right eye    CT reviewed  Will slowly heal  Stay off ASA for now  No NSAIDs  Tylenol PRN for pain  Discussed with family to get bedside rails  Discussed with family to watch for any mental status changes  \

## 2017-12-11 ENCOUNTER — LAB VISIT (OUTPATIENT)
Dept: LAB | Facility: HOSPITAL | Age: 82
End: 2017-12-11
Attending: INTERNAL MEDICINE
Payer: MEDICARE

## 2017-12-11 DIAGNOSIS — E11.9 CONTROLLED TYPE 2 DIABETES MELLITUS WITHOUT COMPLICATION, WITHOUT LONG-TERM CURRENT USE OF INSULIN: ICD-10-CM

## 2017-12-11 DIAGNOSIS — D64.9 ANEMIA OF UNKNOWN ETIOLOGY: ICD-10-CM

## 2017-12-11 DIAGNOSIS — I10 ESSENTIAL HYPERTENSION: ICD-10-CM

## 2017-12-11 DIAGNOSIS — E78.5 HYPERLIPIDEMIA, UNSPECIFIED HYPERLIPIDEMIA TYPE: ICD-10-CM

## 2017-12-11 LAB
ALBUMIN SERPL BCP-MCNC: 3.6 G/DL
ALP SERPL-CCNC: 62 U/L
ALT SERPL W/O P-5'-P-CCNC: 16 U/L
ANION GAP SERPL CALC-SCNC: 9 MMOL/L
AST SERPL-CCNC: 17 U/L
BASOPHILS # BLD AUTO: 0.02 K/UL
BASOPHILS NFR BLD: 0.4 %
BILIRUB SERPL-MCNC: 0.5 MG/DL
BUN SERPL-MCNC: 12 MG/DL
CALCIUM SERPL-MCNC: 9.2 MG/DL
CHLORIDE SERPL-SCNC: 106 MMOL/L
CHOLEST SERPL-MCNC: 149 MG/DL
CHOLEST/HDLC SERPL: 3 {RATIO}
CO2 SERPL-SCNC: 27 MMOL/L
CREAT SERPL-MCNC: 1.2 MG/DL
CREAT UR-MCNC: 136.9 MG/DL
DIFFERENTIAL METHOD: ABNORMAL
EOSINOPHIL # BLD AUTO: 0.1 K/UL
EOSINOPHIL NFR BLD: 2.6 %
ERYTHROCYTE [DISTWIDTH] IN BLOOD BY AUTOMATED COUNT: 13.9 %
EST. GFR  (AFRICAN AMERICAN): >60 ML/MIN/1.73 M^2
EST. GFR  (NON AFRICAN AMERICAN): 52 ML/MIN/1.73 M^2
ESTIMATED AVG GLUCOSE: 146 MG/DL
GLUCOSE SERPL-MCNC: 136 MG/DL
HBA1C MFR BLD HPLC: 6.7 %
HCT VFR BLD AUTO: 39.3 %
HDLC SERPL-MCNC: 50 MG/DL
HDLC SERPL: 33.6 %
HGB BLD-MCNC: 13.3 G/DL
LDLC SERPL CALC-MCNC: 73.4 MG/DL
LYMPHOCYTES # BLD AUTO: 1.4 K/UL
LYMPHOCYTES NFR BLD: 27.6 %
MCH RBC QN AUTO: 31.2 PG
MCHC RBC AUTO-ENTMCNC: 33.8 G/DL
MCV RBC AUTO: 92 FL
MICROALBUMIN UR DL<=1MG/L-MCNC: 48 UG/ML
MICROALBUMIN/CREATININE RATIO: 35.1 UG/MG
MONOCYTES # BLD AUTO: 0.5 K/UL
MONOCYTES NFR BLD: 10.1 %
NEUTROPHILS # BLD AUTO: 2.9 K/UL
NEUTROPHILS NFR BLD: 59.3 %
NONHDLC SERPL-MCNC: 99 MG/DL
PLATELET # BLD AUTO: 135 K/UL
PMV BLD AUTO: 9.1 FL
POTASSIUM SERPL-SCNC: 4.3 MMOL/L
PROT SERPL-MCNC: 6.7 G/DL
RBC # BLD AUTO: 4.26 M/UL
SODIUM SERPL-SCNC: 142 MMOL/L
TRIGL SERPL-MCNC: 128 MG/DL
TSH SERPL DL<=0.005 MIU/L-ACNC: 1.92 UIU/ML
WBC # BLD AUTO: 4.93 K/UL

## 2017-12-11 PROCEDURE — 82570 ASSAY OF URINE CREATININE: CPT

## 2017-12-11 PROCEDURE — 85025 COMPLETE CBC W/AUTO DIFF WBC: CPT

## 2017-12-11 PROCEDURE — 80053 COMPREHEN METABOLIC PANEL: CPT

## 2017-12-11 PROCEDURE — 36415 COLL VENOUS BLD VENIPUNCTURE: CPT

## 2017-12-11 PROCEDURE — 80061 LIPID PANEL: CPT

## 2017-12-11 PROCEDURE — 84443 ASSAY THYROID STIM HORMONE: CPT

## 2017-12-11 PROCEDURE — 83036 HEMOGLOBIN GLYCOSYLATED A1C: CPT

## 2017-12-18 ENCOUNTER — OFFICE VISIT (OUTPATIENT)
Dept: INTERNAL MEDICINE | Facility: CLINIC | Age: 82
End: 2017-12-18
Payer: MEDICARE

## 2017-12-18 VITALS
SYSTOLIC BLOOD PRESSURE: 116 MMHG | WEIGHT: 190.69 LBS | RESPIRATION RATE: 16 BRPM | BODY MASS INDEX: 30.65 KG/M2 | DIASTOLIC BLOOD PRESSURE: 78 MMHG | HEART RATE: 70 BPM | HEIGHT: 66 IN | OXYGEN SATURATION: 97 %

## 2017-12-18 DIAGNOSIS — R13.10 DYSPHAGIA, UNSPECIFIED TYPE: ICD-10-CM

## 2017-12-18 DIAGNOSIS — I10 ESSENTIAL HYPERTENSION: Primary | ICD-10-CM

## 2017-12-18 DIAGNOSIS — E78.5 HYPERLIPIDEMIA, UNSPECIFIED HYPERLIPIDEMIA TYPE: ICD-10-CM

## 2017-12-18 DIAGNOSIS — E11.9 CONTROLLED TYPE 2 DIABETES MELLITUS WITHOUT COMPLICATION, WITHOUT LONG-TERM CURRENT USE OF INSULIN: ICD-10-CM

## 2017-12-18 PROCEDURE — 99999 PR STA SHADOW: CPT | Mod: PBBFAC,,, | Performed by: INTERNAL MEDICINE

## 2017-12-18 PROCEDURE — 99999 PR PBB SHADOW E&M-EST. PATIENT-LVL III: CPT | Mod: PBBFAC,,, | Performed by: INTERNAL MEDICINE

## 2017-12-18 PROCEDURE — 99214 OFFICE O/P EST MOD 30 MIN: CPT | Mod: S$PBB | Performed by: INTERNAL MEDICINE

## 2017-12-18 PROCEDURE — 99213 OFFICE O/P EST LOW 20 MIN: CPT | Mod: PBBFAC | Performed by: INTERNAL MEDICINE

## 2017-12-18 NOTE — PROGRESS NOTES
Subjective:       Patient ID: Bjorn Gupta is a 89 y.o. male.    Chief Complaint: Hyperlipidemia (follow up with lab review); Hypertension; Diabetes; and Dysphagia    Bjorn Gupta is a 89 y.o. male who presents for  Hypertension, and Hyperlipidemia follow up. Labs were reviewed with patient today.      Hyperlipidemia   This is a chronic problem. The current episode started more than 1 year ago. The problem is controlled. Recent lipid tests were reviewed and are low. Exacerbating diseases include obesity. Pertinent negatives include no chest pain, leg pain, myalgias or shortness of breath. Current antihyperlipidemic treatment includes statins. The current treatment provides moderate improvement of lipids. Risk factors for coronary artery disease include male sex, dyslipidemia and hypertension.   Hypertension   This is a chronic problem. The current episode started more than 1 year ago. The problem has been waxing and waning since onset. The problem is controlled. Pertinent negatives include no chest pain, palpitations or shortness of breath. Risk factors for coronary artery disease include sedentary lifestyle, obesity and dyslipidemia. Past treatments include ACE inhibitors, lifestyle changes, beta blockers and central alpha agonists. The current treatment provides moderate improvement. Hypertensive end-organ damage includes CAD/MI.   Diabetes   Pertinent negatives for hypoglycemia include no dizziness, nervousness/anxiousness, pallor, seizures or speech difficulty. Pertinent negatives for diabetes include no chest pain.   Knee Pain    Pertinent negatives include no numbness.   Medication Refill   Pertinent negatives include no abdominal pain, chest pain, chills, congestion, fever, myalgias, numbness, sore throat or vomiting.   Coronary Artery Disease   Pertinent negatives include no chest pain, chest tightness, dizziness, palpitations or shortness of breath. Risk factors include hyperlipidemia and obesity.      Review of Systems   Constitutional: Negative for chills and fever.   HENT: Negative for congestion, postnasal drip and sore throat.    Eyes: Negative for photophobia.   Respiratory: Negative for chest tightness and shortness of breath.    Cardiovascular: Negative for chest pain and palpitations.   Gastrointestinal: Negative for abdominal distention, abdominal pain, blood in stool and vomiting.   Genitourinary: Negative for dysuria, flank pain and hematuria.   Musculoskeletal: Negative for back pain and myalgias.   Skin: Negative for pallor.   Neurological: Negative for dizziness, seizures, facial asymmetry, speech difficulty and numbness.   Hematological: Does not bruise/bleed easily.   Psychiatric/Behavioral: Negative for agitation and suicidal ideas. The patient is not nervous/anxious.        Objective:      Physical Exam   Constitutional: He is oriented to person, place, and time. He appears well-developed and well-nourished.   HENT:   Head: Normocephalic and atraumatic.   Nose: Nose normal.   Mouth/Throat: Oropharynx is clear and moist.   Eyes: Conjunctivae and EOM are normal. Pupils are equal, round, and reactive to light.   Neck: Normal range of motion. Neck supple. No JVD present. No thyromegaly present.   Cardiovascular: Normal rate, regular rhythm, normal heart sounds and intact distal pulses.    Pulmonary/Chest: Effort normal and breath sounds normal.   Abdominal: Soft. Bowel sounds are normal. He exhibits no distension and no mass. There is no tenderness. There is no guarding.   Musculoskeletal: Normal range of motion.   Bilateral  Edema legs   Lymphadenopathy:     He has no cervical adenopathy.   Neurological: He is alert and oriented to person, place, and time. He has normal reflexes. No cranial nerve deficit.   Skin: Skin is warm and dry. No rash noted. No pallor.   Psychiatric: He has a normal mood and affect.   Nursing note and vitals reviewed.      Assessment:       1. Essential hypertension     2. Hyperlipidemia, unspecified hyperlipidemia type    3. Controlled type 2 diabetes mellitus without complication, without long-term current use of insulin    4. Dysphagia, unspecified type        Plan:   Bjorn was seen today for hyperlipidemia, hypertension, diabetes and dysphagia.    Diagnoses and all orders for this visit:    Essential hypertension  -     CBC auto differential; Future  -     Comprehensive metabolic panel; Future  -     Lipid panel; Future    Well controlled.  Continue same medication and dose.  1. Keep weight close to ideal body weight.   2.   Avoid high salt foods (olives, pickles, smoked meats, salted potato chips, etc.).   Do not add salt to your food at the table.   Use only small amounts of salt when cooking.   3. Begin an exercise program. Discuss with your doctor what type of exercise program would be best for you. It doesn't have to be difficult. Even brisk walking for 20 minutes three times a week is a good form of exercise.   4. Avoid medicines which contain heart stimulants. This includes many cold and sinus decongestant pills and sprays as well as diet pills. Check the warnings about hypertension on the label. Stimulants such as amphetamine or cocaine could be lethal for someone with hypertension. Never take these.    Hyperlipidemia, unspecified hyperlipidemia type  -     Lipid panel; Future  -     TSH; Future  The current medical regimen is effective;  continue present plan and medications.  Controlled type 2 diabetes mellitus without complication, without long-term current use of insulin  -     Hemoglobin A1c; Future  -     Lipid panel; Future  -     Microalbumin/creatinine urine ratio; Future  The current medical regimen is effective;  continue present plan and medications.      Dysphagia, unspecified type  Needs to have esophageal stretching ;will be seeing Dr Hernandez.

## 2018-01-12 ENCOUNTER — TELEPHONE (OUTPATIENT)
Dept: INTERNAL MEDICINE | Facility: CLINIC | Age: 83
End: 2018-01-12

## 2018-01-12 NOTE — TELEPHONE ENCOUNTER
Spoke with daughter. Pt just has post nasal drip with coughing at night. He already has prescription cough syrup. Instead of him coming in and being exposed to the flu with his age, I suggested OTC zyrtec, claritin or allegra and flonase. The daughter agreed with this plan as she was not interested in him leaving house either with as bad as the flu is currently.

## 2018-01-12 NOTE — TELEPHONE ENCOUNTER
----- Message from Raheel Mock sent at 2018 10:35 AM CST -----  Contact: ZHOU / DAUGHTER  Bjorn Gupta  MRN: [id number]  : 1925  PCP: Arjun Lund  Home Phone      [phone]  Work Phone      Not on file.  Mobile          [phone]      MESSAGE: WANTS TO SPEAK WITH NURSE REGARDING PT'S CURRENT CONDITION. HAS A BAD COLD. NO FEVER. SHOULD HE BE BROUGHT IN? PLEASE CALL     PHONE: [phone]

## 2018-01-15 ENCOUNTER — OFFICE VISIT (OUTPATIENT)
Dept: INTERNAL MEDICINE | Facility: CLINIC | Age: 83
End: 2018-01-15
Payer: MEDICARE

## 2018-01-15 ENCOUNTER — TELEPHONE (OUTPATIENT)
Dept: INTERNAL MEDICINE | Facility: CLINIC | Age: 83
End: 2018-01-15

## 2018-01-15 ENCOUNTER — LAB VISIT (OUTPATIENT)
Dept: LAB | Facility: HOSPITAL | Age: 83
End: 2018-01-15
Attending: NURSE PRACTITIONER
Payer: MEDICARE

## 2018-01-15 VITALS
SYSTOLIC BLOOD PRESSURE: 130 MMHG | OXYGEN SATURATION: 97 % | HEART RATE: 77 BPM | TEMPERATURE: 98 F | BODY MASS INDEX: 30.51 KG/M2 | DIASTOLIC BLOOD PRESSURE: 64 MMHG | WEIGHT: 189.81 LBS | HEIGHT: 66 IN | RESPIRATION RATE: 18 BRPM

## 2018-01-15 DIAGNOSIS — R19.7 DIARRHEA, UNSPECIFIED TYPE: Primary | ICD-10-CM

## 2018-01-15 DIAGNOSIS — R19.7 DIARRHEA, UNSPECIFIED TYPE: ICD-10-CM

## 2018-01-15 LAB
ANION GAP SERPL CALC-SCNC: 13 MMOL/L
BASOPHILS # BLD AUTO: 0.02 K/UL
BASOPHILS NFR BLD: 0.3 %
BUN SERPL-MCNC: 21 MG/DL
CALCIUM SERPL-MCNC: 8.9 MG/DL
CHLORIDE SERPL-SCNC: 104 MMOL/L
CO2 SERPL-SCNC: 22 MMOL/L
CREAT SERPL-MCNC: 1.3 MG/DL
CTP QC/QA: YES
DIFFERENTIAL METHOD: ABNORMAL
EOSINOPHIL # BLD AUTO: 0.1 K/UL
EOSINOPHIL NFR BLD: 2.2 %
ERYTHROCYTE [DISTWIDTH] IN BLOOD BY AUTOMATED COUNT: 13.8 %
EST. GFR  (AFRICAN AMERICAN): 55 ML/MIN/1.73 M^2
EST. GFR  (NON AFRICAN AMERICAN): 47 ML/MIN/1.73 M^2
GLUCOSE SERPL-MCNC: 123 MG/DL
HCT VFR BLD AUTO: 39.7 %
HGB BLD-MCNC: 13.4 G/DL
LYMPHOCYTES # BLD AUTO: 1.5 K/UL
LYMPHOCYTES NFR BLD: 26 %
MCH RBC QN AUTO: 31.4 PG
MCHC RBC AUTO-ENTMCNC: 33.8 G/DL
MCV RBC AUTO: 93 FL
MOLECULAR INFLUENZA A AGN: NEGATIVE
MOLECULAR INFLUENZA B AGN: NEGATIVE
MONOCYTES # BLD AUTO: 0.8 K/UL
MONOCYTES NFR BLD: 13.8 %
NEUTROPHILS # BLD AUTO: 3.4 K/UL
NEUTROPHILS NFR BLD: 57.7 %
PLATELET # BLD AUTO: 162 K/UL
PMV BLD AUTO: 10 FL
POTASSIUM SERPL-SCNC: 4 MMOL/L
RBC # BLD AUTO: 4.27 M/UL
SODIUM SERPL-SCNC: 139 MMOL/L
WBC # BLD AUTO: 5.93 K/UL

## 2018-01-15 PROCEDURE — 99999 PR PBB SHADOW E&M-EST. PATIENT-LVL IV: CPT | Mod: PBBFAC,,, | Performed by: NURSE PRACTITIONER

## 2018-01-15 PROCEDURE — 80048 BASIC METABOLIC PNL TOTAL CA: CPT

## 2018-01-15 PROCEDURE — 99214 OFFICE O/P EST MOD 30 MIN: CPT | Mod: S$PBB | Performed by: NURSE PRACTITIONER

## 2018-01-15 PROCEDURE — 85025 COMPLETE CBC W/AUTO DIFF WBC: CPT

## 2018-01-15 PROCEDURE — 36415 COLL VENOUS BLD VENIPUNCTURE: CPT

## 2018-01-15 PROCEDURE — 99999 POCT INFLUENZA A/B MOLECULAR: CPT | Mod: PBBFAC,,, | Performed by: NURSE PRACTITIONER

## 2018-01-15 PROCEDURE — 99999 PR STA SHADOW: CPT | Mod: PBBFAC,,, | Performed by: NURSE PRACTITIONER

## 2018-01-15 PROCEDURE — 99214 OFFICE O/P EST MOD 30 MIN: CPT | Mod: PBBFAC | Performed by: NURSE PRACTITIONER

## 2018-01-15 PROCEDURE — 87502 INFLUENZA DNA AMP PROBE: CPT | Mod: QW | Performed by: NURSE PRACTITIONER

## 2018-01-15 NOTE — TELEPHONE ENCOUNTER
----- Message from Geraldine Elizalde sent at 1/15/2018  8:06 AM CST -----  Contact: Pt's daughter Leyda Gupta  MRN: [id number]  : 1925  PCP: Arjun Lund  Home Phone      [phone]  Work Phone      Not on file.  Mobile          [phone]      MESSAGE:  Pt is much worse than Friday and Leyda wants pt to come in today for an appt. Says he has really bad diarrhea and is getting dehydrated. Please advise.  PHONE:  [phone]

## 2018-01-15 NOTE — PROGRESS NOTES
Subjective:       Patient ID: Bjorn Gupta is a 89 y.o. male.    Chief Complaint: Diarrhea and Cough    New to me but seen previously in clinic by a fellow provider.     Present in clinic with daughter and daughter in law who reports patient with hx of dementia.     Symptoms began 1/12/18 as head cold.   Then next day with decreased appetite. Had x1 episode of vomiting then diarrhea multiple times per her hour. Given immodium without relief.   Yesterday diarrhea continued with minimal PO intake and now with c/o low abdominal pain.   Today reports diarrhea improving, none since yesterday evening. Small PO intake without vomiting.   Denies abdominal pain or discomfort at this time. States cramping with diarrhea.      Denies fever at any time. Began taking zyrtec 1/12/18 when began. URI symptoms improved.       Review of Systems   Constitutional: Negative for activity change, appetite change, chills, diaphoresis, fatigue, fever and unexpected weight change.   HENT: Positive for postnasal drip and rhinorrhea. Negative for congestion, ear pain, sinus pain, sinus pressure, sneezing, sore throat and voice change.    Eyes: Negative for pain and visual disturbance.   Respiratory: Positive for cough. Negative for chest tightness and shortness of breath.    Cardiovascular: Negative for chest pain, palpitations and leg swelling.   Gastrointestinal: Positive for abdominal pain, diarrhea and nausea. Negative for abdominal distention, anal bleeding, blood in stool, constipation, rectal pain and vomiting.   Endocrine: Negative.    Genitourinary: Negative for difficulty urinating.   Musculoskeletal: Negative for arthralgias, gait problem and myalgias.   Skin: Negative for pallor and rash.   Allergic/Immunologic: Positive for environmental allergies.   Neurological: Negative for speech difficulty and headaches.   Hematological: Negative.    Psychiatric/Behavioral: Negative.    All other systems reviewed and are negative.       Objective:      Physical Exam   Constitutional: He is oriented to person, place, and time. Vital signs are normal. He appears well-developed and well-nourished. He is cooperative. No distress.   HENT:   Head: Normocephalic and atraumatic.   Right Ear: Tympanic membrane, external ear and ear canal normal.   Left Ear: Tympanic membrane, external ear and ear canal normal.   Nose: Nose normal. No mucosal edema or rhinorrhea.   Mouth/Throat: Uvula is midline, oropharynx is clear and moist and mucous membranes are normal.   Eyes: Conjunctivae and lids are normal.   Neck: Trachea normal, normal range of motion and phonation normal. Neck supple. No tracheal tenderness, no spinous process tenderness and no muscular tenderness present. No neck rigidity. No tracheal deviation present.   Cardiovascular: Normal rate and intact distal pulses.  An irregular rhythm present.   Murmur heard.  Pulmonary/Chest: Effort normal and breath sounds normal. No stridor. No respiratory distress. He has no wheezes. He has no rales.   Abdominal: Soft. Normal appearance and bowel sounds are normal. He exhibits no distension. There is no tenderness. There is no rigidity, no rebound, no guarding and no CVA tenderness.   Lymphadenopathy:     He has no cervical adenopathy.        Right: No supraclavicular adenopathy present.        Left: No supraclavicular adenopathy present.   Neurological: He is alert and oriented to person, place, and time. No cranial nerve deficit.   Skin: Skin is warm, dry and intact. No rash noted.   Psychiatric: He has a normal mood and affect. His speech is normal and behavior is normal. Judgment and thought content normal. Cognition and memory are impaired.   Nursing note and vitals reviewed.      Results for orders placed or performed in visit on 01/15/18   POCT Influenza A/B Molecular   Result Value Ref Range    Molecular Influenza A Ag Negative Negative    Molecular Influenza B Ag Negative Negative      Acceptable Yes        CMP  Sodium   Date Value Ref Range Status   01/15/2018 139 136 - 145 mmol/L Final     Potassium   Date Value Ref Range Status   01/15/2018 4.0 3.5 - 5.1 mmol/L Final     Chloride   Date Value Ref Range Status   01/15/2018 104 95 - 110 mmol/L Final     CO2   Date Value Ref Range Status   01/15/2018 22 (L) 23 - 29 mmol/L Final     Glucose   Date Value Ref Range Status   01/15/2018 123 (H) 70 - 110 mg/dL Final     BUN, Bld   Date Value Ref Range Status   01/15/2018 21 10 - 30 mg/dL Final     Creatinine   Date Value Ref Range Status   01/15/2018 1.3 0.5 - 1.4 mg/dL Final     Calcium   Date Value Ref Range Status   01/15/2018 8.9 8.7 - 10.5 mg/dL Final     Total Protein   Date Value Ref Range Status   12/11/2017 6.7 6.0 - 8.4 g/dL Final     Albumin   Date Value Ref Range Status   12/11/2017 3.6 3.5 - 5.2 g/dL Final     Total Bilirubin   Date Value Ref Range Status   12/11/2017 0.5 0.1 - 1.0 mg/dL Final     Comment:     For infants and newborns, interpretation of results should be based  on gestational age, weight and in agreement with clinical  observations.  Premature Infant recommended reference ranges:  Up to 24 hours.............<8.0 mg/dL  Up to 48 hours............<12.0 mg/dL  3-5 days..................<15.0 mg/dL  6-29 days.................<15.0 mg/dL       Alkaline Phosphatase   Date Value Ref Range Status   12/11/2017 62 55 - 135 U/L Final     AST   Date Value Ref Range Status   12/11/2017 17 10 - 40 U/L Final     ALT   Date Value Ref Range Status   12/11/2017 16 10 - 44 U/L Final     Anion Gap   Date Value Ref Range Status   01/15/2018 13 8 - 16 mmol/L Final     eGFR if    Date Value Ref Range Status   01/15/2018 55 (A) >60 mL/min/1.73 m^2 Final     eGFR if non    Date Value Ref Range Status   01/15/2018 47 (A) >60 mL/min/1.73 m^2 Final     Comment:     Calculation used to obtain the estimated glomerular filtration  rate (eGFR) is the CKD-EPI equation.           Lab Results   Component Value Date    WBC 5.93 01/15/2018    RBC 4.27 (L) 01/15/2018    HGB 13.4 (L) 01/15/2018    HCT 39.7 (L) 01/15/2018    MCV 93 01/15/2018    MCH 31.4 (H) 01/15/2018    MCHC 33.8 01/15/2018    RDW 13.8 01/15/2018     01/15/2018    MPV 10.0 01/15/2018    GRAN 3.4 01/15/2018    GRAN 57.7 01/15/2018    LYMPH 1.5 01/15/2018    LYMPH 26.0 01/15/2018    MONO 0.8 01/15/2018    MONO 13.8 01/15/2018    EOS 0.1 01/15/2018    BASO 0.02 01/15/2018    EOSINOPHIL 2.2 01/15/2018    BASOPHIL 0.3 01/15/2018           Assessment:       1. Diarrhea, unspecified type        Plan:       1. Diarrhea, unspecified type  Flu negative. Diarrhea resolving. Labs drawn today reviewed. No acute electrolyte anomaly. No immediate concern. Maintain hydration by drinking small amounts of clear fluids frequently, then soft diet, and then advance diet as tolerated. May use OTC Imodium if desired for any diarrhea.  Call if symptoms worsen, high fever, severe weakness or fainting, increased abdominal pain, blood in stool or vomit, or failure to improve in 2-3 days.    - POCT Influenza A/B Molecular  - Basic metabolic panel; Future  - CBC auto differential; Future    ROSIE Ly, FNP-C  Family/Internal Medicine  [facility]

## 2018-01-25 DIAGNOSIS — R60.0 EDEMA OF BOTH LEGS: ICD-10-CM

## 2018-01-25 RX ORDER — FUROSEMIDE 20 MG/1
TABLET ORAL
Qty: 30 TABLET | Refills: 5 | Status: SHIPPED | OUTPATIENT
Start: 2018-01-25 | End: 2018-03-28

## 2018-03-02 ENCOUNTER — OFFICE VISIT (OUTPATIENT)
Dept: NEUROLOGY | Facility: CLINIC | Age: 83
End: 2018-03-02
Payer: MEDICARE

## 2018-03-02 VITALS
DIASTOLIC BLOOD PRESSURE: 74 MMHG | HEIGHT: 67 IN | HEART RATE: 76 BPM | WEIGHT: 196.19 LBS | SYSTOLIC BLOOD PRESSURE: 132 MMHG | BODY MASS INDEX: 30.79 KG/M2 | RESPIRATION RATE: 16 BRPM

## 2018-03-02 DIAGNOSIS — G56.03 CARPAL TUNNEL SYNDROME, BILATERAL: ICD-10-CM

## 2018-03-02 DIAGNOSIS — G62.9 NEUROPATHY: ICD-10-CM

## 2018-03-02 DIAGNOSIS — E11.9 CONTROLLED TYPE 2 DIABETES MELLITUS WITHOUT COMPLICATION, WITHOUT LONG-TERM CURRENT USE OF INSULIN: ICD-10-CM

## 2018-03-02 DIAGNOSIS — R41.3 MEMORY LOSS: Primary | ICD-10-CM

## 2018-03-02 PROCEDURE — 99213 OFFICE O/P EST LOW 20 MIN: CPT | Mod: PBBFAC | Performed by: PSYCHIATRY & NEUROLOGY

## 2018-03-02 PROCEDURE — 99999 PR PBB SHADOW E&M-EST. PATIENT-LVL III: CPT | Mod: PBBFAC,,, | Performed by: PSYCHIATRY & NEUROLOGY

## 2018-03-02 PROCEDURE — 99214 OFFICE O/P EST MOD 30 MIN: CPT | Mod: S$PBB | Performed by: PSYCHIATRY & NEUROLOGY

## 2018-03-02 PROCEDURE — 99999 PR STA SHADOW: CPT | Mod: PBBFAC,,, | Performed by: PSYCHIATRY & NEUROLOGY

## 2018-03-02 NOTE — PROGRESS NOTES
"HPI: Bjorn Gupta is a 89 y.o. male with over one year of numbness in the hands. 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   Mood is good. Appetite and sleep are great.   His neck pain is not active and he has some numbness in the hands but it not very bothersome. No numbness in the feet.   The patient rolled out of his bed and fell- was seen at ER in 11/2017 and had CT head. He speaks a great deal at night.  Family has corrected this with a bed-rail and a monitor.     ROS   Not reliable due to memory loss    Exam:  Gen Appearance, well developed/nourished in no apparent distress  CV: 2+ distal pulses with no edema or swelling  Neuro:  MS: Awake, alert, oriented to place, person,  Time as month (does not know date), not fully oriented to situation. Sustains attention. Recent recall is forgotten rarely/remote memory intact, Language is full to spontaneous speech/comprehension. Fund of Knowledge is full  CN: Optic discs are flat with normal vasculature, PERRL, Extraoccular movements and visual fields are full. Normal facial s strength,  Tongue and Palate are midline and strong. Shoulder Shrug symmetric and strong.  Motor: Normal bulk, tone, no abnormal movements. 5/5 strength bilateral upper/lower extremities with 2+ reflexes   Sensory: symmetric to  temp, and vibration, Romberg mildly positive  Cerebellar: Finger-nose,Heal-shin, Rapid alternating movements intact  Gait: Normal stance, no ataxia and uses cane well      EMG: Mild Axonal polyneuropathy effecting the hands, possibly from age or from patient's history of "pre-diabetes", Superimposed Bilateral Carpal " "Tunnel Syndrome which is severe on the right,  Cervical radiculopathy best localizing to C6 bilaterally and C7 on the left     CT head 11/2017: Age-appropriate generalized cerebral volume loss with mild/moderate  degree of patchy decreased attenuation supratentorial white matter suggestive for chronic microvascular ischemic change similarly seen on the prior.     No evidence for acute intracranial hemorrhage or definite new abnormal parenchymal attenuation. Clinical correlation and further evaluation as warranted.    Labs: A1C 6.7 in 12/2017    B12 low normal 2017    Assessment/Plan: Bjorn Gupta is a 89 y.o. male with over one year of numbness in the hands. Some degenerative changes not unexpected for age on C spine xray.2013  EMG showed: Mild Axonal polyneuropathy effecting the hands, possibly from age or from patient's history of "pre-diabetes", Superimposed Bilateral Carpal Tunnel Syndrome which is severe on the right, and Cervical radiculopathy best localizing to C6 bilaterally and C7 on the left   I recommend:     1.  Patient continues with  numbness in the hands (not bothersome) without  cervical radicular symptoms (he would defer C spine surgery at this point/ no active symptoms/ ). This is not responding well to brace but patient's symptoms do sound most consistent with CTS, but he declined CTR offered by orthopedist  2. He is now diagnosed with DM2. Continue good DM2 control to prevent worsening neuropathy.   3. Fall precautions reviewed- now walking with a cane. Needs a lift chair for home use.   4. Patient's memory is worsening over time.  Namenda helping to stabalize symptoms and he continues B12 for lower normal levels found. He lives with family and no longer drives  5. 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 Could consider medication if further bothersome.     RTC in 6 months              "

## 2018-03-22 DIAGNOSIS — I10 ESSENTIAL HYPERTENSION: ICD-10-CM

## 2018-03-22 RX ORDER — DOXAZOSIN 2 MG/1
TABLET ORAL
Qty: 30 TABLET | Refills: 5 | Status: SHIPPED | OUTPATIENT
Start: 2018-03-22 | End: 2018-08-02

## 2018-03-22 RX ORDER — ROSUVASTATIN CALCIUM 20 MG/1
TABLET, COATED ORAL
Qty: 30 TABLET | Refills: 5 | Status: SHIPPED | OUTPATIENT
Start: 2018-03-22 | End: 2018-09-21 | Stop reason: SDUPTHER

## 2018-03-22 RX ORDER — METOPROLOL SUCCINATE 50 MG/1
TABLET, EXTENDED RELEASE ORAL
Qty: 30 TABLET | Refills: 5 | Status: SHIPPED | OUTPATIENT
Start: 2018-03-22 | End: 2018-09-21 | Stop reason: SDUPTHER

## 2018-03-27 ENCOUNTER — TELEPHONE (OUTPATIENT)
Dept: INTERNAL MEDICINE | Facility: CLINIC | Age: 83
End: 2018-03-27

## 2018-03-27 NOTE — TELEPHONE ENCOUNTER
Spoke to patient's daughter and she states that the family is beginning to notice a decline in his condition. Sleeping all day, increased SOB. We agreed that he should see Dr. Lund this week. Appt scheduled for tomorrow @ 2:00 pm

## 2018-03-27 NOTE — TELEPHONE ENCOUNTER
----- Message from Geraldine Elizalde sent at 3/27/2018  8:04 AM CDT -----  Contact: pt's daughter Leyda Gupta  MRN: [id number]  : 1925  PCP: Arjun Lund  Home Phone      [phone]  Work Phone      Not on file.  Mobile          [phone]      MESSAGE:  Pt is sleeping a lot more than normal and Leyda says that he has been getting short of breath walking short distances, like across the room. Please advise.  PHONE:  [phone]

## 2018-03-28 ENCOUNTER — LAB VISIT (OUTPATIENT)
Dept: LAB | Facility: HOSPITAL | Age: 83
End: 2018-03-28
Attending: INTERNAL MEDICINE
Payer: MEDICARE

## 2018-03-28 ENCOUNTER — OFFICE VISIT (OUTPATIENT)
Dept: INTERNAL MEDICINE | Facility: CLINIC | Age: 83
End: 2018-03-28
Payer: MEDICARE

## 2018-03-28 VITALS
SYSTOLIC BLOOD PRESSURE: 112 MMHG | OXYGEN SATURATION: 97 % | WEIGHT: 203.06 LBS | RESPIRATION RATE: 14 BRPM | HEIGHT: 67 IN | HEART RATE: 53 BPM | BODY MASS INDEX: 31.87 KG/M2 | DIASTOLIC BLOOD PRESSURE: 80 MMHG

## 2018-03-28 DIAGNOSIS — R06.00 DYSPNEA, UNSPECIFIED TYPE: Primary | ICD-10-CM

## 2018-03-28 DIAGNOSIS — R53.83 FATIGUE, UNSPECIFIED TYPE: ICD-10-CM

## 2018-03-28 DIAGNOSIS — M25.552 PAIN OF BOTH HIP JOINTS: ICD-10-CM

## 2018-03-28 DIAGNOSIS — R06.00 DYSPNEA, UNSPECIFIED TYPE: ICD-10-CM

## 2018-03-28 DIAGNOSIS — M25.551 PAIN OF BOTH HIP JOINTS: ICD-10-CM

## 2018-03-28 DIAGNOSIS — R60.0 EDEMA OF BOTH LEGS: ICD-10-CM

## 2018-03-28 LAB
ANION GAP SERPL CALC-SCNC: 10 MMOL/L
BNP SERPL-MCNC: 557 PG/ML
BUN SERPL-MCNC: 20 MG/DL
CALCIUM SERPL-MCNC: 8.8 MG/DL
CHLORIDE SERPL-SCNC: 105 MMOL/L
CO2 SERPL-SCNC: 27 MMOL/L
CREAT SERPL-MCNC: 1.3 MG/DL
EST. GFR  (AFRICAN AMERICAN): 54 ML/MIN/1.73 M^2
EST. GFR  (NON AFRICAN AMERICAN): 47 ML/MIN/1.73 M^2
GLUCOSE SERPL-MCNC: 128 MG/DL
POTASSIUM SERPL-SCNC: 4.3 MMOL/L
SODIUM SERPL-SCNC: 142 MMOL/L

## 2018-03-28 PROCEDURE — 80048 BASIC METABOLIC PNL TOTAL CA: CPT

## 2018-03-28 PROCEDURE — 99999 PR STA SHADOW: CPT | Mod: PBBFAC,,, | Performed by: INTERNAL MEDICINE

## 2018-03-28 PROCEDURE — 99999 PR PBB SHADOW E&M-EST. PATIENT-LVL III: CPT | Mod: PBBFAC,,, | Performed by: INTERNAL MEDICINE

## 2018-03-28 PROCEDURE — 83880 ASSAY OF NATRIURETIC PEPTIDE: CPT

## 2018-03-28 PROCEDURE — 99213 OFFICE O/P EST LOW 20 MIN: CPT | Mod: PBBFAC | Performed by: INTERNAL MEDICINE

## 2018-03-28 PROCEDURE — 99213 OFFICE O/P EST LOW 20 MIN: CPT | Mod: S$PBB | Performed by: INTERNAL MEDICINE

## 2018-03-28 PROCEDURE — 36415 COLL VENOUS BLD VENIPUNCTURE: CPT

## 2018-03-28 RX ORDER — FUROSEMIDE 40 MG/1
40 TABLET ORAL DAILY
Qty: 30 TABLET | Refills: 0 | Status: SHIPPED | OUTPATIENT
Start: 2018-03-28 | End: 2018-08-30 | Stop reason: SDUPTHER

## 2018-03-28 NOTE — PROGRESS NOTES
Subjective:       Patient ID: Bjorn Gupta is a 89 y.o. male.    Chief Complaint: Shortness of Breath; Fatigue; and Hip Pain    Shortness of Breath   This is a new problem. The current episode started 1 to 4 weeks ago. The problem occurs every several days. The problem has been gradually worsening. Associated symptoms include leg swelling and PND. Pertinent negatives include no abdominal pain, chest pain, fever, sore throat or vomiting. Treatments tried: lasix 20 mg    Fatigue   Associated symptoms include arthralgias and fatigue. Pertinent negatives include no abdominal pain, chest pain, chills, congestion, fever, myalgias, numbness, sore throat or vomiting.   Hip Pain    Pertinent negatives include no numbness.     Review of Systems   Constitutional: Positive for fatigue. Negative for chills and fever.   HENT: Negative for congestion, postnasal drip and sore throat.    Eyes: Negative for photophobia.   Respiratory: Positive for shortness of breath. Negative for chest tightness.    Cardiovascular: Positive for leg swelling and PND. Negative for chest pain and palpitations.   Gastrointestinal: Negative for abdominal distention, abdominal pain, blood in stool and vomiting.   Genitourinary: Negative for dysuria, flank pain and hematuria.   Musculoskeletal: Positive for arthralgias and gait problem. Negative for back pain and myalgias.        Both hip hurt   Skin: Negative for pallor.   Neurological: Negative for dizziness, seizures, facial asymmetry, speech difficulty and numbness.   Hematological: Does not bruise/bleed easily.   Psychiatric/Behavioral: Negative for agitation and suicidal ideas. The patient is not nervous/anxious.        Objective:      Physical Exam   Constitutional: He is oriented to person, place, and time. He appears well-developed and well-nourished.   HENT:   Head: Normocephalic and atraumatic.   Nose: Nose normal.   Mouth/Throat: Oropharynx is clear and moist.   Eyes: Conjunctivae and EOM  are normal. Pupils are equal, round, and reactive to light.   Neck: Normal range of motion. Neck supple. No JVD present. No thyromegaly present.   Cardiovascular: Normal rate, regular rhythm, normal heart sounds and intact distal pulses.    Pulmonary/Chest: Effort normal and breath sounds normal.   Abdominal: Soft. Bowel sounds are normal. He exhibits no distension and no mass. There is no tenderness. There is no guarding.   Musculoskeletal: Normal range of motion.   Bilateral  Edema legs   Lymphadenopathy:     He has no cervical adenopathy.   Neurological: He is alert and oriented to person, place, and time. He has normal reflexes. No cranial nerve deficit.   Skin: Skin is warm and dry. No rash noted. No pallor.   Psychiatric: He has a normal mood and affect.   Nursing note and vitals reviewed.      Assessment:       1. Dyspnea, unspecified type    2. Pain of both hip joints    3. Fatigue, unspecified type    4. Edema of both legs        Plan:   Bjorn was seen today for shortness of breath, fatigue and hip pain.    Diagnoses and all orders for this visit:    Dyspnea, unspecified type  -     Basic metabolic panel; Future  -     Brain natriuretic peptide; Future  -     furosemide (LASIX) 40 MG tablet; Take 1 tablet (40 mg total) by mouth once daily.  We discussed about leg edema  And  weight gain issues with CHF.  I educated patient to call  If he  Gains 5 lbs and more ,or legs start swelling or worsening shortness of breath , or inability to lie down.  If breathing is worse go to emergency Room      Pain of both hip joints  Tylenol arthritis   Fatigue, unspecified type  Debility ;  Will need PT ; ordered    Edema of both legs  Increase lasix ;   Concern for CHF .

## 2018-04-05 ENCOUNTER — OFFICE VISIT (OUTPATIENT)
Dept: INTERNAL MEDICINE | Facility: CLINIC | Age: 83
End: 2018-04-05
Payer: MEDICARE

## 2018-04-05 VITALS
SYSTOLIC BLOOD PRESSURE: 110 MMHG | OXYGEN SATURATION: 96 % | DIASTOLIC BLOOD PRESSURE: 64 MMHG | HEIGHT: 67 IN | BODY MASS INDEX: 30 KG/M2 | RESPIRATION RATE: 16 BRPM | HEART RATE: 64 BPM | WEIGHT: 191.13 LBS

## 2018-04-05 DIAGNOSIS — I50.9 CONGESTIVE HEART FAILURE, UNSPECIFIED CONGESTIVE HEART FAILURE CHRONICITY, UNSPECIFIED CONGESTIVE HEART FAILURE TYPE: Primary | ICD-10-CM

## 2018-04-05 PROCEDURE — 99999 PR STA SHADOW: CPT | Mod: PBBFAC,,, | Performed by: INTERNAL MEDICINE

## 2018-04-05 PROCEDURE — 99213 OFFICE O/P EST LOW 20 MIN: CPT | Mod: S$PBB | Performed by: INTERNAL MEDICINE

## 2018-04-05 PROCEDURE — 99213 OFFICE O/P EST LOW 20 MIN: CPT | Mod: PBBFAC | Performed by: INTERNAL MEDICINE

## 2018-04-05 PROCEDURE — 99999 PR PBB SHADOW E&M-EST. PATIENT-LVL III: CPT | Mod: PBBFAC,,, | Performed by: INTERNAL MEDICINE

## 2018-04-05 NOTE — PROGRESS NOTES
Subjective:       Patient ID: Bjorn Gupta is a 89 y.o. male.    Chief Complaint: Shortness of Breath (1 week follow up with lab review)    Bjorn Gupta is a 89 y.o. male  Here with follow up for CHF.  His lasix was increased from 20 mg daily .   Lost more than 10 lbs;  Doing well; breathing better;        Shortness of Breath   Associated symptoms include leg swelling. Pertinent negatives include no abdominal pain, chest pain, fever, sore throat or vomiting.     Review of Systems   Constitutional: Positive for fatigue. Negative for chills and fever.   HENT: Negative for congestion, postnasal drip and sore throat.    Eyes: Negative for photophobia.   Respiratory: Positive for shortness of breath. Negative for chest tightness.    Cardiovascular: Positive for leg swelling. Negative for chest pain and palpitations.   Gastrointestinal: Negative for abdominal distention, abdominal pain, blood in stool and vomiting.   Genitourinary: Negative for dysuria, flank pain and hematuria.   Musculoskeletal: Positive for arthralgias and gait problem. Negative for back pain and myalgias.        Both hip hurt   Skin: Negative for pallor.   Neurological: Negative for dizziness, seizures, facial asymmetry, speech difficulty and numbness.   Hematological: Does not bruise/bleed easily.   Psychiatric/Behavioral: Negative for agitation and suicidal ideas. The patient is not nervous/anxious.        Objective:      Physical Exam   Constitutional: He is oriented to person, place, and time. He appears well-developed and well-nourished.   HENT:   Head: Normocephalic and atraumatic.   Nose: Nose normal.   Mouth/Throat: Oropharynx is clear and moist.   Eyes: Conjunctivae and EOM are normal. Pupils are equal, round, and reactive to light.   Neck: Normal range of motion. Neck supple. No JVD present. No thyromegaly present.   Cardiovascular: Normal rate, regular rhythm, normal heart sounds and intact distal pulses.    Pulmonary/Chest: Effort  normal and breath sounds normal.   Abdominal: Soft. Bowel sounds are normal. He exhibits no distension and no mass. There is no tenderness. There is no guarding.   Musculoskeletal: Normal range of motion.   Bilateral  Edema legs   Lymphadenopathy:     He has no cervical adenopathy.   Neurological: He is alert and oriented to person, place, and time. He has normal reflexes. No cranial nerve deficit.   Skin: Skin is warm and dry. No rash noted. No pallor.   Psychiatric: He has a normal mood and affect.   Nursing note and vitals reviewed.      Assessment:       1. Congestive heart failure, unspecified congestive heart failure chronicity, unspecified congestive heart failure type        Plan:   Bjorn was seen today for shortness of breath.    Diagnoses and all orders for this visit:    Congestive heart failure, unspecified congestive heart failure chronicity, unspecified congestive heart failure type    We discussed about leg edema  And  weight gain issues with CHF.  I educated patient to call  If he  Gains 5 lbs and more ,or legs start swelling or worsening shortness of breath , or inability to lie down.  If breathing is worse go to emergency Room      Good weight 190 lbs.  If weight goes down from 185; cut back on lasix from 40 to 20  If weight goes above 195 ; up the lasix to 40 mg

## 2018-05-15 ENCOUNTER — TELEPHONE (OUTPATIENT)
Dept: INTERNAL MEDICINE | Facility: CLINIC | Age: 83
End: 2018-05-15

## 2018-05-15 NOTE — TELEPHONE ENCOUNTER
----- Message from Geraldine Elizalde sent at 5/15/2018  8:31 AM CDT -----  Contact: pt's daughter  Bjorn Gupta  MRN: [id number]  : 1925  PCP: Arjun Lund  Home Phone      [phone]  Work Phone      Not on file.  Mobile          [phone]      MESSAGE:  Leyda calling for a second opinion on some tests another provider had pt do. Please advise.  PHONE:  [phone]

## 2018-06-12 ENCOUNTER — TELEPHONE (OUTPATIENT)
Dept: INTERNAL MEDICINE | Facility: CLINIC | Age: 83
End: 2018-06-12

## 2018-06-12 NOTE — TELEPHONE ENCOUNTER
Patient's daughter called asking if we'd received paperwork (test results) from [facility]. Leyda notified that we didn't receive anything from Dr. Andrade. Leyda stated she'll contact Dr. Andrade's office and have them fax over test results.

## 2018-06-12 NOTE — TELEPHONE ENCOUNTER
----- Message from Raheel Mock sent at 2018 10:06 AM CDT -----  Contact: ZHOU / DAUGHTER  Bjorn Gupta  MRN: [id number]  : 1925  PCP: Arjun Holliday  Home Phone      [phone]  Work Phone      Not on file.  Mobile          [phone]      MESSAGE: SAID SHE WANTS TO SPEAK WITH DR HOLLIDAY NURSE ABOUT THE PT. THEY RECEIVED SOME INFORMATION FROM ANOTHER PROVIDER AND SHE WANTS TO SPEAK WITH THE NURSE.    PHONE: [phone]

## 2018-06-18 ENCOUNTER — LAB VISIT (OUTPATIENT)
Dept: LAB | Facility: HOSPITAL | Age: 83
End: 2018-06-18
Attending: INTERNAL MEDICINE
Payer: MEDICARE

## 2018-06-18 DIAGNOSIS — I10 ESSENTIAL HYPERTENSION: ICD-10-CM

## 2018-06-18 DIAGNOSIS — E78.5 HYPERLIPIDEMIA, UNSPECIFIED HYPERLIPIDEMIA TYPE: ICD-10-CM

## 2018-06-18 DIAGNOSIS — E11.9 CONTROLLED TYPE 2 DIABETES MELLITUS WITHOUT COMPLICATION, WITHOUT LONG-TERM CURRENT USE OF INSULIN: ICD-10-CM

## 2018-06-18 LAB
ALBUMIN SERPL BCP-MCNC: 3.6 G/DL
ALP SERPL-CCNC: 86 U/L
ALT SERPL W/O P-5'-P-CCNC: 14 U/L
ANION GAP SERPL CALC-SCNC: 7 MMOL/L
AST SERPL-CCNC: 13 U/L
BASOPHILS # BLD AUTO: 0.02 K/UL
BASOPHILS NFR BLD: 0.5 %
BILIRUB SERPL-MCNC: 0.7 MG/DL
BUN SERPL-MCNC: 18 MG/DL
CALCIUM SERPL-MCNC: 9.4 MG/DL
CHLORIDE SERPL-SCNC: 105 MMOL/L
CHOLEST SERPL-MCNC: 125 MG/DL
CHOLEST/HDLC SERPL: 3.1 {RATIO}
CO2 SERPL-SCNC: 29 MMOL/L
CREAT SERPL-MCNC: 1.2 MG/DL
CREAT UR-MCNC: 97.4 MG/DL
DIFFERENTIAL METHOD: ABNORMAL
EOSINOPHIL # BLD AUTO: 0.1 K/UL
EOSINOPHIL NFR BLD: 3 %
ERYTHROCYTE [DISTWIDTH] IN BLOOD BY AUTOMATED COUNT: 18.2 %
EST. GFR  (AFRICAN AMERICAN): 60 ML/MIN/1.73 M^2
EST. GFR  (NON AFRICAN AMERICAN): 52 ML/MIN/1.73 M^2
ESTIMATED AVG GLUCOSE: 148 MG/DL
GLUCOSE SERPL-MCNC: 128 MG/DL
HBA1C MFR BLD HPLC: 6.8 %
HCT VFR BLD AUTO: 37.1 %
HDLC SERPL-MCNC: 40 MG/DL
HDLC SERPL: 32 %
HGB BLD-MCNC: 11.6 G/DL
LDLC SERPL CALC-MCNC: 61 MG/DL
LYMPHOCYTES # BLD AUTO: 0.9 K/UL
LYMPHOCYTES NFR BLD: 24.8 %
MCH RBC QN AUTO: 27.4 PG
MCHC RBC AUTO-ENTMCNC: 31.3 G/DL
MCV RBC AUTO: 88 FL
MICROALBUMIN UR DL<=1MG/L-MCNC: 24 UG/ML
MICROALBUMIN/CREATININE RATIO: 24.6 UG/MG
MONOCYTES # BLD AUTO: 0.5 K/UL
MONOCYTES NFR BLD: 12.4 %
NEUTROPHILS # BLD AUTO: 2.2 K/UL
NEUTROPHILS NFR BLD: 59.3 %
NONHDLC SERPL-MCNC: 85 MG/DL
PLATELET # BLD AUTO: 137 K/UL
PMV BLD AUTO: 10 FL
POTASSIUM SERPL-SCNC: 4.4 MMOL/L
PROT SERPL-MCNC: 6.9 G/DL
RBC # BLD AUTO: 4.23 M/UL
SODIUM SERPL-SCNC: 141 MMOL/L
TRIGL SERPL-MCNC: 120 MG/DL
TSH SERPL DL<=0.005 MIU/L-ACNC: 2.55 UIU/ML
WBC # BLD AUTO: 3.71 K/UL

## 2018-06-18 PROCEDURE — 80053 COMPREHEN METABOLIC PANEL: CPT

## 2018-06-18 PROCEDURE — 85025 COMPLETE CBC W/AUTO DIFF WBC: CPT

## 2018-06-18 PROCEDURE — 82043 UR ALBUMIN QUANTITATIVE: CPT

## 2018-06-18 PROCEDURE — 84443 ASSAY THYROID STIM HORMONE: CPT

## 2018-06-18 PROCEDURE — 80061 LIPID PANEL: CPT

## 2018-06-18 PROCEDURE — 36415 COLL VENOUS BLD VENIPUNCTURE: CPT

## 2018-06-18 PROCEDURE — 83036 HEMOGLOBIN GLYCOSYLATED A1C: CPT

## 2018-06-19 RX ORDER — MEMANTINE HYDROCHLORIDE 5 MG/1
5 TABLET ORAL 2 TIMES DAILY
Qty: 60 TABLET | Refills: 11 | Status: SHIPPED | OUTPATIENT
Start: 2018-06-19 | End: 2019-01-07 | Stop reason: SDUPTHER

## 2018-07-02 ENCOUNTER — OFFICE VISIT (OUTPATIENT)
Dept: INTERNAL MEDICINE | Facility: CLINIC | Age: 83
End: 2018-07-02
Payer: MEDICARE

## 2018-07-02 VITALS
DIASTOLIC BLOOD PRESSURE: 66 MMHG | WEIGHT: 188.69 LBS | SYSTOLIC BLOOD PRESSURE: 102 MMHG | HEART RATE: 60 BPM | HEIGHT: 67 IN | OXYGEN SATURATION: 98 % | BODY MASS INDEX: 29.62 KG/M2 | RESPIRATION RATE: 14 BRPM

## 2018-07-02 DIAGNOSIS — D69.6 THROMBOCYTOPENIA: ICD-10-CM

## 2018-07-02 DIAGNOSIS — E78.5 HYPERLIPIDEMIA, UNSPECIFIED HYPERLIPIDEMIA TYPE: ICD-10-CM

## 2018-07-02 DIAGNOSIS — I10 ESSENTIAL HYPERTENSION: Primary | ICD-10-CM

## 2018-07-02 DIAGNOSIS — I71.40 ABDOMINAL AORTIC ANEURYSM (AAA) WITHOUT RUPTURE: ICD-10-CM

## 2018-07-02 DIAGNOSIS — I48.91 ATRIAL FIBRILLATION WITH NORMAL VENTRICULAR RATE: ICD-10-CM

## 2018-07-02 DIAGNOSIS — E11.9 CONTROLLED TYPE 2 DIABETES MELLITUS WITHOUT COMPLICATION, WITHOUT LONG-TERM CURRENT USE OF INSULIN: ICD-10-CM

## 2018-07-02 DIAGNOSIS — D64.9 ANEMIA OF UNKNOWN ETIOLOGY: ICD-10-CM

## 2018-07-02 PROCEDURE — 99213 OFFICE O/P EST LOW 20 MIN: CPT | Mod: PBBFAC | Performed by: INTERNAL MEDICINE

## 2018-07-02 PROCEDURE — 99214 OFFICE O/P EST MOD 30 MIN: CPT | Mod: S$PBB | Performed by: INTERNAL MEDICINE

## 2018-07-02 PROCEDURE — 99999 PR PBB SHADOW E&M-EST. PATIENT-LVL III: CPT | Mod: PBBFAC,,, | Performed by: INTERNAL MEDICINE

## 2018-07-02 PROCEDURE — 99999 PR STA SHADOW: CPT | Mod: PBBFAC,,, | Performed by: INTERNAL MEDICINE

## 2018-07-02 RX ORDER — APIXABAN 2.5 MG/1
2.5 TABLET, FILM COATED ORAL 2 TIMES DAILY
COMMUNITY
Start: 2018-06-19 | End: 2018-08-02

## 2018-07-02 NOTE — PROGRESS NOTES
Subjective:       Patient ID: Bjorn Gupta is a 89 y.o. male.    Chief Complaint: Hyperlipidemia (FOLLOW UP WITH LAB REVIEW); Hypertension; and Diabetes    Bjorn Gupta is a 89 y.o. male who presents for  Hypertension, and Hyperlipidemia follow up. Labs were reviewed with patient today.      Hyperlipidemia   This is a chronic problem. The current episode started more than 1 year ago. The problem is controlled. Recent lipid tests were reviewed and are low. Exacerbating diseases include obesity. Pertinent negatives include no chest pain, leg pain, myalgias or shortness of breath. Current antihyperlipidemic treatment includes statins. The current treatment provides moderate improvement of lipids. Risk factors for coronary artery disease include male sex, dyslipidemia and hypertension.   Hypertension   This is a chronic problem. The current episode started more than 1 year ago. The problem has been waxing and waning since onset. The problem is controlled. Pertinent negatives include no chest pain, palpitations or shortness of breath. Risk factors for coronary artery disease include sedentary lifestyle, obesity and dyslipidemia. Past treatments include ACE inhibitors, lifestyle changes, beta blockers and central alpha agonists. The current treatment provides moderate improvement. Hypertensive end-organ damage includes CAD/MI.   Diabetes   Pertinent negatives for hypoglycemia include no dizziness, nervousness/anxiousness, pallor, seizures or speech difficulty. Pertinent negatives for diabetes include no chest pain.   Knee Pain    Pertinent negatives include no numbness.   Medication Refill   Pertinent negatives include no abdominal pain, chest pain, chills, congestion, fever, myalgias, numbness, sore throat or vomiting.   Coronary Artery Disease   Pertinent negatives include no chest pain, chest tightness, dizziness, palpitations or shortness of breath. Risk factors include hyperlipidemia and obesity.     Review of  Systems   Constitutional: Negative for chills and fever.   HENT: Negative for congestion, postnasal drip and sore throat.    Eyes: Negative for photophobia.   Respiratory: Negative for chest tightness and shortness of breath.    Cardiovascular: Negative for chest pain and palpitations.   Gastrointestinal: Negative for abdominal distention, abdominal pain, blood in stool and vomiting.   Genitourinary: Negative for dysuria, flank pain and hematuria.   Musculoskeletal: Negative for back pain and myalgias.   Skin: Negative for pallor.   Neurological: Negative for dizziness, seizures, facial asymmetry, speech difficulty and numbness.   Hematological: Does not bruise/bleed easily.   Psychiatric/Behavioral: Negative for agitation and suicidal ideas. The patient is not nervous/anxious.        Objective:      Physical Exam   Constitutional: He is oriented to person, place, and time. He appears well-developed and well-nourished.   HENT:   Head: Normocephalic and atraumatic.   Nose: Nose normal.   Mouth/Throat: Oropharynx is clear and moist.   Eyes: Conjunctivae and EOM are normal. Pupils are equal, round, and reactive to light.   Neck: Normal range of motion. Neck supple. No JVD present. No thyromegaly present.   Cardiovascular: Normal rate, regular rhythm, normal heart sounds and intact distal pulses.    Pulmonary/Chest: Effort normal and breath sounds normal.   Abdominal: Soft. Bowel sounds are normal. He exhibits no distension and no mass. There is no tenderness. There is no guarding.   Musculoskeletal: Normal range of motion.   Bilateral  Edema legs   Lymphadenopathy:     He has no cervical adenopathy.   Neurological: He is alert and oriented to person, place, and time. He has normal reflexes. No cranial nerve deficit.   Skin: Skin is warm and dry. No rash noted. No pallor.   Psychiatric: He has a normal mood and affect.   Nursing note and vitals reviewed.      Assessment:       1. Essential hypertension    2.  Hyperlipidemia, unspecified hyperlipidemia type    3. Controlled type 2 diabetes mellitus without complication, without long-term current use of insulin    4. Anemia of unknown etiology    5. Thrombocytopenia    6. Abdominal aortic aneurysm (AAA) without rupture    7. Atrial fibrillation with normal ventricular rate        Plan:   Essential hypertension  -     CBC auto differential; Future; Expected date: 12/29/2018  -     Comprehensive metabolic panel; Future; Expected date: 12/29/2018    Well controlled.  Continue same medication and dose.  1. Keep weight close to ideal body weight.   2.   Avoid high salt foods (olives, pickles, smoked meats, salted potato chips, etc.).   Do not add salt to your food at the table.   Use only small amounts of salt when cooking.   3. Begin an exercise program. Discuss with your doctor what type of exercise program would be best for you. It doesn't have to be difficult. Even brisk walking for 20 minutes three times a week is a good form of exercise.   4. Avoid medicines which contain heart stimulants. This includes many cold and sinus decongestant pills and sprays as well as diet pills. Check the warnings about hypertension on the label. Stimulants such as amphetamine or cocaine could be lethal for someone with hypertension. Never take these.    Hyperlipidemia, unspecified hyperlipidemia type  -     Lipid panel; Future; Expected date: 12/29/2018  Well controlled.  Continue same medication and dose.  Controlled type 2 diabetes mellitus without complication, without long-term current use of insulin  -     Hemoglobin A1c; Future; Expected date: 12/29/2018  -     Microalbumin/creatinine urine ratio; Future; Expected date: 12/29/2018  Patient has controlled Diabetes .  We discussed about diet ;low in calories. Avoid sweats, sodas.  Also increasing activity;walking 2-3 miles a day.  I also adjusted medications and gave patient  instructions about adherence to plan.  Goal of  A1c  less than  7 % stressed.  Also goal of LDL less than 70 highlighted to patient.  Anemia of unknown etiology  -     CBC auto differential; Future; Expected date: 12/29/2018  -     CBC auto differential; Future; Expected date: 09/30/2018    Thrombocytopenia  -     CBC auto differential; Future; Expected date: 12/29/2018  Follow cbc     Abdominal aortic aneurysm (AAA) without rupture  -     TSH; Future; Expected date: 12/29/2018  Continue with cardiology     Atrial fibrillation with normal ventricular rate  -     TSH; Future; Expected date: 12/29/2018    Stable.

## 2018-07-23 ENCOUNTER — LAB VISIT (OUTPATIENT)
Dept: LAB | Facility: HOSPITAL | Age: 83
End: 2018-07-23
Attending: INTERNAL MEDICINE
Payer: MEDICARE

## 2018-07-23 ENCOUNTER — TELEPHONE (OUTPATIENT)
Dept: INTERNAL MEDICINE | Facility: CLINIC | Age: 83
End: 2018-07-23

## 2018-07-23 DIAGNOSIS — K92.1 MELENA: Primary | ICD-10-CM

## 2018-07-23 DIAGNOSIS — K92.1 MELENA: ICD-10-CM

## 2018-07-23 LAB
BASOPHILS # BLD AUTO: 0.04 K/UL
BASOPHILS NFR BLD: 0.6 %
DIFFERENTIAL METHOD: ABNORMAL
EOSINOPHIL # BLD AUTO: 0.2 K/UL
EOSINOPHIL NFR BLD: 3.1 %
ERYTHROCYTE [DISTWIDTH] IN BLOOD BY AUTOMATED COUNT: 19.5 %
HCT VFR BLD AUTO: 24.7 %
HGB BLD-MCNC: 7.6 G/DL
LYMPHOCYTES # BLD AUTO: 1.6 K/UL
LYMPHOCYTES NFR BLD: 25.2 %
MCH RBC QN AUTO: 28.3 PG
MCHC RBC AUTO-ENTMCNC: 30.8 G/DL
MCV RBC AUTO: 92 FL
MONOCYTES # BLD AUTO: 0.8 K/UL
MONOCYTES NFR BLD: 13.5 %
NEUTROPHILS # BLD AUTO: 3.6 K/UL
NEUTROPHILS NFR BLD: 57.6 %
PLATELET # BLD AUTO: 144 K/UL
PMV BLD AUTO: 9.4 FL
RBC # BLD AUTO: 2.69 M/UL
WBC # BLD AUTO: 6.22 K/UL

## 2018-07-23 PROCEDURE — 36415 COLL VENOUS BLD VENIPUNCTURE: CPT

## 2018-07-23 PROCEDURE — 85025 COMPLETE CBC W/AUTO DIFF WBC: CPT

## 2018-07-23 NOTE — TELEPHONE ENCOUNTER
Stop elaquis   Check cbc and stools today . Ordered   See me this week.    If a lot of bleeding ;weakness ;ER

## 2018-07-23 NOTE — TELEPHONE ENCOUNTER
----- Message from Opal Velasquez sent at 2018  8:05 AM CDT -----  Contact: Leyda/Daughter  Bjorn Gupta  MRN: [id number]  : 1925  PCP: Arjun Lund  Home Phone      [phone]  Work Phone      Not on file.  Mobile          [phone]    MESSAGE: For the last three days he has been having black stool, and yesterday it was bloody.  He has also been having an upset stomach with this.  Dr. Lund instructed him to contact him if he should start bleeding from anywhere due to his low blood count. Please call to advise.    Phone: [phone]

## 2018-07-24 ENCOUNTER — LAB VISIT (OUTPATIENT)
Dept: LAB | Facility: HOSPITAL | Age: 83
End: 2018-07-24
Attending: INTERNAL MEDICINE
Payer: MEDICARE

## 2018-07-24 DIAGNOSIS — K92.1 MELENA: ICD-10-CM

## 2018-07-24 LAB — OB PNL STL: POSITIVE

## 2018-07-24 PROCEDURE — 82272 OCCULT BLD FECES 1-3 TESTS: CPT

## 2018-07-25 ENCOUNTER — HOSPITAL ENCOUNTER (EMERGENCY)
Facility: HOSPITAL | Age: 83
End: 2018-07-25
Attending: SURGERY
Payer: MEDICARE

## 2018-07-25 VITALS
RESPIRATION RATE: 20 BRPM | OXYGEN SATURATION: 100 % | TEMPERATURE: 98 F | BODY MASS INDEX: 29.44 KG/M2 | HEART RATE: 52 BPM | SYSTOLIC BLOOD PRESSURE: 130 MMHG | WEIGHT: 188 LBS | DIASTOLIC BLOOD PRESSURE: 52 MMHG

## 2018-07-25 DIAGNOSIS — K92.2 GASTROINTESTINAL HEMORRHAGE, UNSPECIFIED GASTROINTESTINAL HEMORRHAGE TYPE: Primary | ICD-10-CM

## 2018-07-25 DIAGNOSIS — R53.1 WEAKNESS: ICD-10-CM

## 2018-07-25 LAB
ABO + RH BLD: NORMAL
ALBUMIN SERPL BCP-MCNC: 3.2 G/DL
ALP SERPL-CCNC: 77 U/L
ALT SERPL W/O P-5'-P-CCNC: 14 U/L
ANION GAP SERPL CALC-SCNC: 9 MMOL/L
AST SERPL-CCNC: 14 U/L
BASOPHILS # BLD AUTO: 0.04 K/UL
BASOPHILS NFR BLD: 0.7 %
BILIRUB SERPL-MCNC: 0.5 MG/DL
BLD GP AB SCN CELLS X3 SERPL QL: NORMAL
BLD PROD TYP BPU: NORMAL
BLOOD UNIT EXPIRATION DATE: NORMAL
BLOOD UNIT TYPE CODE: 600
BLOOD UNIT TYPE: NORMAL
BUN SERPL-MCNC: 28 MG/DL
CALCIUM SERPL-MCNC: 8.6 MG/DL
CHLORIDE SERPL-SCNC: 106 MMOL/L
CO2 SERPL-SCNC: 23 MMOL/L
CODING SYSTEM: NORMAL
CREAT SERPL-MCNC: 1 MG/DL
DIFFERENTIAL METHOD: ABNORMAL
DISPENSE STATUS: NORMAL
EOSINOPHIL # BLD AUTO: 0.1 K/UL
EOSINOPHIL NFR BLD: 2.4 %
ERYTHROCYTE [DISTWIDTH] IN BLOOD BY AUTOMATED COUNT: 21.2 %
EST. GFR  (AFRICAN AMERICAN): >60 ML/MIN/1.73 M^2
EST. GFR  (NON AFRICAN AMERICAN): >60 ML/MIN/1.73 M^2
GLUCOSE SERPL-MCNC: 115 MG/DL
HCT VFR BLD AUTO: 22.6 %
HGB BLD-MCNC: 7.1 G/DL
LYMPHOCYTES # BLD AUTO: 1.1 K/UL
LYMPHOCYTES NFR BLD: 20.9 %
MCH RBC QN AUTO: 29.1 PG
MCHC RBC AUTO-ENTMCNC: 31.4 G/DL
MCV RBC AUTO: 93 FL
MONOCYTES # BLD AUTO: 0.7 K/UL
MONOCYTES NFR BLD: 13.4 %
NEUTROPHILS # BLD AUTO: 3.4 K/UL
NEUTROPHILS NFR BLD: 62.6 %
PLATELET # BLD AUTO: 152 K/UL
PMV BLD AUTO: 9 FL
POTASSIUM SERPL-SCNC: 3.9 MMOL/L
PROT SERPL-MCNC: 6 G/DL
RBC # BLD AUTO: 2.44 M/UL
SODIUM SERPL-SCNC: 138 MMOL/L
TRANS ERYTHROCYTES VOL PATIENT: NORMAL ML
WBC # BLD AUTO: 5.45 K/UL

## 2018-07-25 PROCEDURE — 36415 COLL VENOUS BLD VENIPUNCTURE: CPT

## 2018-07-25 PROCEDURE — 86920 COMPATIBILITY TEST SPIN: CPT

## 2018-07-25 PROCEDURE — 93010 ELECTROCARDIOGRAM REPORT: CPT | Mod: ,,, | Performed by: INTERNAL MEDICINE

## 2018-07-25 PROCEDURE — P9021 RED BLOOD CELLS UNIT: HCPCS

## 2018-07-25 PROCEDURE — 99285 EMERGENCY DEPT VISIT HI MDM: CPT | Mod: 25

## 2018-07-25 PROCEDURE — 25000003 PHARM REV CODE 250: Performed by: SURGERY

## 2018-07-25 PROCEDURE — 93005 ELECTROCARDIOGRAM TRACING: CPT

## 2018-07-25 PROCEDURE — 86901 BLOOD TYPING SEROLOGIC RH(D): CPT

## 2018-07-25 PROCEDURE — 36430 TRANSFUSION BLD/BLD COMPNT: CPT

## 2018-07-25 PROCEDURE — 85025 COMPLETE CBC W/AUTO DIFF WBC: CPT

## 2018-07-25 PROCEDURE — 80053 COMPREHEN METABOLIC PANEL: CPT

## 2018-07-25 RX ORDER — HYDROCODONE BITARTRATE AND ACETAMINOPHEN 500; 5 MG/1; MG/1
TABLET ORAL
Status: DISCONTINUED | OUTPATIENT
Start: 2018-07-25 | End: 2018-07-25 | Stop reason: HOSPADM

## 2018-07-25 RX ADMIN — SODIUM CHLORIDE 500 ML: 0.9 INJECTION, SOLUTION INTRAVENOUS at 03:07

## 2018-07-25 RX ADMIN — SODIUM CHLORIDE: 900 INJECTION, SOLUTION INTRAVENOUS at 03:07

## 2018-07-25 NOTE — ED TRIAGE NOTES
Arrives POV with c/o rectal bleed since Saturday. Saw PCP earlier this week and was told to come to ER for possible blood transfusion

## 2018-07-25 NOTE — ED NOTES
Report received. Care assumed. Introduced myself to pt. And family. Patient resting. Updated on plan of care. Refer to flow sheet or progress note for assessment and vital signs. Call bell in reach. Bed locked and in lowest position, side rail up X1. Patient in no acute distress. Awaiting additional orders. Will continue to monitor patient closely.

## 2018-07-25 NOTE — ED NOTES
Pt. up out of bed (OOB) to restroom via w/c with son. Pt. reports mild dyspnea upon return from bathroom; 02 @ 2L per N/C applied. Pt. denies dizziness or weakness. Will continue to monitor.

## 2018-07-25 NOTE — ED NOTES
Pt connected to continuous cardiac monitor, automatic BP cuff, and continuous pulse ox monitor.  Will continue to monitor pt. A-fib with SVR/rate 50's noted to monitor.

## 2018-07-25 NOTE — ED PROVIDER NOTES
Encounter Date: 7/25/2018       History     Chief Complaint   Patient presents with    Rectal Bleeding    Abnormal Lab     Patient is a 89yo W male who is a patient of Dr. Lund.  He has had melena over 2-3 days, Dr. Lund confirmed Hemoccult + stool.  Patient has been anemic, today has increasing dyspnea and weakness.          Review of patient's allergies indicates:   Allergen Reactions    Wasp sting [allergen ext-venom-honey bee] Anaphylaxis    Codeine Other (See Comments)     Other reaction(s): jittery     Past Medical History:   Diagnosis Date    AAA (abdominal aortic aneurysm)     Bladder tumor     Blind left eye     CAD (coronary artery disease)     Controlled type 2 diabetes mellitus without complication, without long-term current use of insulin 6/15/2017    Hyperlipidemia     Hypertension     Urinary tract infection      Past Surgical History:   Procedure Laterality Date    aaa stent  2004    COLONOSCOPY W/ BIOPSIES AND POLYPECTOMY      CYSTOSCOPY  2016    TONSILLECTOMY      TRANSURETHRAL RESECTION OF PROSTATE  1996     Family History   Problem Relation Age of Onset    Prostate cancer Neg Hx     Kidney disease Neg Hx      Social History   Substance Use Topics    Smoking status: Former Smoker     Packs/day: 1.00     Years: 20.00     Types: Cigarettes     Start date: 1/1/1943     Quit date: 1/1/1963    Smokeless tobacco: Never Used    Alcohol use No     Review of Systems   HENT: Negative.    Respiratory: Positive for shortness of breath.    Cardiovascular: Negative.    Gastrointestinal: Positive for blood in stool.   Genitourinary: Negative.    Musculoskeletal: Negative.    Skin: Negative.    Neurological: Positive for weakness.   Psychiatric/Behavioral: Negative.        Physical Exam     Initial Vitals   BP Pulse Resp Temp SpO2   07/25/18 1243 07/25/18 1243 07/25/18 1243 07/25/18 1243 07/25/18 1244   (!) 98/54 76 18 96.9 °F (36.1 °C) 100 %      MAP       --                Physical  Exam    Nursing note and vitals reviewed.  Constitutional: He appears well-developed and well-nourished.   HENT:   Head: Normocephalic and atraumatic.   Eyes: EOM are normal. Pupils are equal, round, and reactive to light.   Neck: Normal range of motion. Neck supple.   Cardiovascular: Normal rate.   Pulmonary/Chest: Breath sounds normal. No respiratory distress.   Abdominal: Soft.   Musculoskeletal: Normal range of motion. He exhibits no tenderness.   Neurological: He is alert.   Skin: Skin is warm and dry.   Psychiatric: He has a normal mood and affect. Thought content normal.         ED Course   Procedures  Labs Reviewed - No data to display       Imaging Results    None     Hgb 7, with melena.  Will transfer to [facility] with GI capability.                          Clinical Impression:   The primary encounter diagnosis was Gastrointestinal hemorrhage, unspecified gastrointestinal hemorrhage type. A diagnosis of Weakness was also pertinent to this visit.      Disposition:   Disposition: Transferred  Condition: Stable                        Destin Deleon Jr., MD  07/25/18 8481

## 2018-07-25 NOTE — ED NOTES
Consent obtained for blood transfusion with voiced understanding of instruction per patient and daughter.

## 2018-07-25 NOTE — ED NOTES
Patient resting. Updated on plan of care. Refer to flow sheet or progress note for assessment and vital signs. Call bell in reach. Bed locked and in lowest position, side rail up X1. Patient in no acute distress. Awaiting additional orders. Will continue to monitor patient closely.

## 2018-08-02 ENCOUNTER — OFFICE VISIT (OUTPATIENT)
Dept: INTERNAL MEDICINE | Facility: CLINIC | Age: 83
End: 2018-08-02
Payer: MEDICARE

## 2018-08-02 VITALS
WEIGHT: 188.06 LBS | OXYGEN SATURATION: 97 % | RESPIRATION RATE: 14 BRPM | BODY MASS INDEX: 29.52 KG/M2 | HEIGHT: 67 IN | SYSTOLIC BLOOD PRESSURE: 128 MMHG | HEART RATE: 57 BPM | DIASTOLIC BLOOD PRESSURE: 64 MMHG

## 2018-08-02 DIAGNOSIS — I48.91 ATRIAL FIBRILLATION WITH NORMAL VENTRICULAR RATE: ICD-10-CM

## 2018-08-02 DIAGNOSIS — K92.2 GASTROINTESTINAL HEMORRHAGE, UNSPECIFIED GASTROINTESTINAL HEMORRHAGE TYPE: Primary | ICD-10-CM

## 2018-08-02 PROCEDURE — 99213 OFFICE O/P EST LOW 20 MIN: CPT | Mod: PBBFAC | Performed by: INTERNAL MEDICINE

## 2018-08-02 PROCEDURE — 99213 OFFICE O/P EST LOW 20 MIN: CPT | Mod: S$PBB | Performed by: INTERNAL MEDICINE

## 2018-08-02 PROCEDURE — 99999 PR STA SHADOW: CPT | Mod: PBBFAC,,, | Performed by: INTERNAL MEDICINE

## 2018-08-02 PROCEDURE — 99999 PR PBB SHADOW E&M-EST. PATIENT-LVL III: CPT | Mod: PBBFAC,,, | Performed by: INTERNAL MEDICINE

## 2018-08-02 RX ORDER — FERROUS SULFATE 325(65) MG
325 TABLET ORAL 3 TIMES DAILY
Qty: 90 TABLET | Refills: 5 | Status: SHIPPED | OUTPATIENT
Start: 2018-08-02 | End: 2019-01-17 | Stop reason: SDUPTHER

## 2018-08-02 NOTE — PROGRESS NOTES
Subjective:       Patient ID: Bjorn Gupta is a 89 y.o. male.    Chief Complaint: Gastrointestinal hemorrhage (HOSPITAL FOLLOW UP)    Bjorn Gupta is a 89 y.o. male  Here for follow up for GI bleeding .  He received 2 units of PRBC; ; went to  ER then   Transferred to [facility].  He is off of eliquis and ASA .    He is off of cardura.        Review of Systems   Constitutional: Negative for chills and fever.   HENT: Negative for congestion, postnasal drip and sore throat.    Eyes: Negative for photophobia.   Respiratory: Negative for chest tightness and shortness of breath.    Cardiovascular: Negative for chest pain and palpitations.   Gastrointestinal: Negative for abdominal distention, abdominal pain, blood in stool and vomiting.   Genitourinary: Negative for dysuria, flank pain and hematuria.   Musculoskeletal: Negative for back pain and myalgias.   Skin: Negative for pallor.   Neurological: Negative for dizziness, seizures, facial asymmetry, speech difficulty and numbness.   Hematological: Does not bruise/bleed easily.   Psychiatric/Behavioral: Negative for agitation and suicidal ideas. The patient is not nervous/anxious.        Objective:      Physical Exam   Constitutional: He is oriented to person, place, and time. He appears well-developed and well-nourished.   HENT:   Head: Normocephalic and atraumatic.   Nose: Nose normal.   Mouth/Throat: Oropharynx is clear and moist.   Eyes: Conjunctivae and EOM are normal. Pupils are equal, round, and reactive to light.   Neck: Normal range of motion. Neck supple. No JVD present. No thyromegaly present.   Cardiovascular: Normal rate, regular rhythm, normal heart sounds and intact distal pulses.    Pulmonary/Chest: Effort normal and breath sounds normal.   Abdominal: Soft. Bowel sounds are normal. He exhibits no distension and no mass. There is no tenderness. There is no guarding.   Musculoskeletal: Normal range of motion.   Bilateral  Edema legs   Lymphadenopathy:      He has no cervical adenopathy.   Neurological: He is alert and oriented to person, place, and time. He has normal reflexes. No cranial nerve deficit.   Skin: Skin is warm and dry. No rash noted. No pallor.   Psychiatric: He has a normal mood and affect.   Nursing note and vitals reviewed.      Assessment:       1. Gastrointestinal hemorrhage, unspecified gastrointestinal hemorrhage type    2. Atrial fibrillation with normal ventricular rate        Plan:   Bjorn was seen today for gastrointestinal hemorrhage.    Diagnoses and all orders for this visit:    Gastrointestinal hemorrhage, unspecified gastrointestinal hemorrhage type  -     CBC auto differential; Future  -     ferrous sulfate 325 mg (65 mg iron) Tab tablet; Take 1 tablet (325 mg total) by mouth 3 (three) times daily.    Atrial fibrillation with normal ventricular rate  -     CBC auto differential; Future        I had a long discussions  With him and family   Stay off of ASA and eliquis..    Took 2 units of PRBC.

## 2018-08-21 DIAGNOSIS — R60.0 EDEMA OF BOTH LEGS: ICD-10-CM

## 2018-08-21 RX ORDER — FUROSEMIDE 20 MG/1
TABLET ORAL
Qty: 30 TABLET | Refills: 5 | Status: SHIPPED | OUTPATIENT
Start: 2018-08-21 | End: 2019-01-07 | Stop reason: SDUPTHER

## 2018-08-30 ENCOUNTER — LAB VISIT (OUTPATIENT)
Dept: LAB | Facility: HOSPITAL | Age: 83
End: 2018-08-30
Attending: INTERNAL MEDICINE
Payer: MEDICARE

## 2018-08-30 ENCOUNTER — OFFICE VISIT (OUTPATIENT)
Dept: INTERNAL MEDICINE | Facility: CLINIC | Age: 83
End: 2018-08-30
Payer: MEDICARE

## 2018-08-30 VITALS
BODY MASS INDEX: 27.48 KG/M2 | DIASTOLIC BLOOD PRESSURE: 64 MMHG | OXYGEN SATURATION: 97 % | HEIGHT: 67 IN | RESPIRATION RATE: 14 BRPM | HEART RATE: 67 BPM | SYSTOLIC BLOOD PRESSURE: 126 MMHG | WEIGHT: 175.06 LBS

## 2018-08-30 DIAGNOSIS — R05.9 COUGH: ICD-10-CM

## 2018-08-30 DIAGNOSIS — K92.2 GASTROINTESTINAL HEMORRHAGE, UNSPECIFIED GASTROINTESTINAL HEMORRHAGE TYPE: ICD-10-CM

## 2018-08-30 DIAGNOSIS — D50.0 ANEMIA DUE TO GI BLOOD LOSS: Primary | ICD-10-CM

## 2018-08-30 DIAGNOSIS — I48.91 ATRIAL FIBRILLATION WITH NORMAL VENTRICULAR RATE: ICD-10-CM

## 2018-08-30 LAB
BASOPHILS # BLD AUTO: 0.05 K/UL
BASOPHILS NFR BLD: 1 %
DIFFERENTIAL METHOD: ABNORMAL
EOSINOPHIL # BLD AUTO: 0.2 K/UL
EOSINOPHIL NFR BLD: 3.6 %
ERYTHROCYTE [DISTWIDTH] IN BLOOD BY AUTOMATED COUNT: 17.2 %
HCT VFR BLD AUTO: 40.6 %
HGB BLD-MCNC: 12.9 G/DL
LYMPHOCYTES # BLD AUTO: 1.2 K/UL
LYMPHOCYTES NFR BLD: 24.4 %
MCH RBC QN AUTO: 29.5 PG
MCHC RBC AUTO-ENTMCNC: 31.8 G/DL
MCV RBC AUTO: 93 FL
MONOCYTES # BLD AUTO: 0.5 K/UL
MONOCYTES NFR BLD: 10.1 %
NEUTROPHILS # BLD AUTO: 3.1 K/UL
NEUTROPHILS NFR BLD: 60.9 %
PLATELET # BLD AUTO: 155 K/UL
PMV BLD AUTO: 9.2 FL
RBC # BLD AUTO: 4.38 M/UL
WBC # BLD AUTO: 5.05 K/UL

## 2018-08-30 PROCEDURE — 99999 PR STA SHADOW: CPT | Mod: PBBFAC,,, | Performed by: INTERNAL MEDICINE

## 2018-08-30 PROCEDURE — 36415 COLL VENOUS BLD VENIPUNCTURE: CPT

## 2018-08-30 PROCEDURE — 85025 COMPLETE CBC W/AUTO DIFF WBC: CPT

## 2018-08-30 PROCEDURE — 99999 PR PBB SHADOW E&M-EST. PATIENT-LVL III: CPT | Mod: PBBFAC,,, | Performed by: INTERNAL MEDICINE

## 2018-08-30 PROCEDURE — 99213 OFFICE O/P EST LOW 20 MIN: CPT | Mod: PBBFAC | Performed by: INTERNAL MEDICINE

## 2018-08-30 PROCEDURE — 99213 OFFICE O/P EST LOW 20 MIN: CPT | Mod: S$PBB | Performed by: INTERNAL MEDICINE

## 2018-08-30 RX ORDER — DOXYCYCLINE 100 MG/1
100 CAPSULE ORAL 2 TIMES DAILY
Qty: 20 CAPSULE | Refills: 0 | Status: SHIPPED | OUTPATIENT
Start: 2018-08-30 | End: 2018-09-19

## 2018-08-30 NOTE — PROGRESS NOTES
Subjective:       Patient ID: Bjorn Gupta is a 89 y.o. male.    Chief Complaint: GI Bleeding (4 week follow up with lab review); Weight Loss; and Cough    Bjorn Gupta is a 89 y.o. male  Here for follow up for GI bleeding .  He received 2 units of PRBC; ; went to  ER then   Transferred to [facility].  He is off of eliquis and ASA .    He is off of cardura.      8/30/18  He is here for follow up.  Stopped asa and eliquis       Lab Results       Component                Value               Date                       WBC                      5.05                08/30/2018                 HGB                      12.9 (L)            08/30/2018                 HCT                      40.6                08/30/2018                 MCV                      93                  08/30/2018                 PLT                      155                 08/30/2018                        Review of Systems   Constitutional: Negative for chills and fever.   HENT: Negative for congestion, postnasal drip and sore throat.    Eyes: Negative for photophobia.   Respiratory: Negative for chest tightness and shortness of breath.    Cardiovascular: Negative for chest pain and palpitations.   Gastrointestinal: Negative for abdominal distention, abdominal pain, blood in stool and vomiting.   Genitourinary: Negative for dysuria, flank pain and hematuria.   Musculoskeletal: Negative for back pain and myalgias.   Skin: Negative for pallor.   Neurological: Negative for dizziness, seizures, facial asymmetry, speech difficulty and numbness.   Hematological: Does not bruise/bleed easily.   Psychiatric/Behavioral: Negative for agitation and suicidal ideas. The patient is not nervous/anxious.        Objective:      Physical Exam   Constitutional: He is oriented to person, place, and time. He appears well-developed and well-nourished.   HENT:   Head: Normocephalic and atraumatic.   Nose: Nose normal.   Mouth/Throat: Oropharynx is clear and moist.    Eyes: Conjunctivae and EOM are normal. Pupils are equal, round, and reactive to light.   Neck: Normal range of motion. Neck supple. No JVD present. No thyromegaly present.   Cardiovascular: Normal rate, regular rhythm, normal heart sounds and intact distal pulses.   Pulmonary/Chest: Effort normal and breath sounds normal.   Abdominal: Soft. Bowel sounds are normal. He exhibits no distension and no mass. There is no tenderness. There is no guarding.   Musculoskeletal: Normal range of motion.   Bilateral  Edema legs   Lymphadenopathy:     He has no cervical adenopathy.   Neurological: He is alert and oriented to person, place, and time. He has normal reflexes. No cranial nerve deficit.   Skin: Skin is warm and dry. No rash noted. No pallor.   Psychiatric: He has a normal mood and affect.   Nursing note and vitals reviewed.      Assessment:       1. Anemia due to GI blood loss    2. Cough        Plan:   Bjorn was seen today for gi bleeding, weight loss and cough.    Diagnoses and all orders for this visit:    Anemia due to GI blood loss  Improved  Stay off of ASA and eliquis     Cough  Will treat for bronchitis   Doxy ordered.

## 2018-09-06 ENCOUNTER — OFFICE VISIT (OUTPATIENT)
Dept: NEUROLOGY | Facility: CLINIC | Age: 83
End: 2018-09-06
Payer: MEDICARE

## 2018-09-06 VITALS
HEIGHT: 67 IN | DIASTOLIC BLOOD PRESSURE: 80 MMHG | BODY MASS INDEX: 27.86 KG/M2 | HEART RATE: 70 BPM | WEIGHT: 177.5 LBS | SYSTOLIC BLOOD PRESSURE: 132 MMHG | RESPIRATION RATE: 14 BRPM

## 2018-09-06 DIAGNOSIS — F02.81 ALZHEIMER'S DEMENTIA WITH BEHAVIORAL DISTURBANCE, UNSPECIFIED TIMING OF DEMENTIA ONSET: Primary | ICD-10-CM

## 2018-09-06 DIAGNOSIS — E78.5 HYPERLIPIDEMIA, UNSPECIFIED HYPERLIPIDEMIA TYPE: ICD-10-CM

## 2018-09-06 DIAGNOSIS — I10 ESSENTIAL HYPERTENSION: ICD-10-CM

## 2018-09-06 DIAGNOSIS — R45.1 AGITATION: ICD-10-CM

## 2018-09-06 DIAGNOSIS — G30.9 ALZHEIMER'S DEMENTIA WITH BEHAVIORAL DISTURBANCE, UNSPECIFIED TIMING OF DEMENTIA ONSET: Primary | ICD-10-CM

## 2018-09-06 PROBLEM — F02.80 ALZHEIMER'S DEMENTIA: Status: ACTIVE | Noted: 2018-09-06

## 2018-09-06 PROCEDURE — 99214 OFFICE O/P EST MOD 30 MIN: CPT | Mod: PBBFAC | Performed by: NURSE PRACTITIONER

## 2018-09-06 PROCEDURE — 99999 PR PBB SHADOW E&M-EST. PATIENT-LVL IV: CPT | Mod: PBBFAC,,, | Performed by: NURSE PRACTITIONER

## 2018-09-06 PROCEDURE — 99214 OFFICE O/P EST MOD 30 MIN: CPT | Mod: S$PBB | Performed by: NURSE PRACTITIONER

## 2018-09-06 PROCEDURE — 99999 PR STA SHADOW: CPT | Mod: PBBFAC,,, | Performed by: NURSE PRACTITIONER

## 2018-09-06 RX ORDER — FERROUS SULFATE 325(65) MG
325 TABLET, DELAYED RELEASE (ENTERIC COATED) ORAL
COMMUNITY
End: 2019-10-29 | Stop reason: SDUPTHER

## 2018-09-06 RX ORDER — SERTRALINE HYDROCHLORIDE 25 MG/1
25 TABLET, FILM COATED ORAL DAILY
Qty: 30 TABLET | Refills: 11 | Status: SHIPPED | OUTPATIENT
Start: 2018-09-06 | End: 2019-01-07 | Stop reason: SDUPTHER

## 2018-09-06 NOTE — PROGRESS NOTES
"HPI: Bjorn Gupta is a 89 y.o. Male with Alzheimer's dementia, with numbness in the hands. Some degenerative changes not unexpected for age on C spine x-ray.2013  EMG showed: Mild Axonal polyneuropathy effecting the hands, possibly from age or from patient's history of "pre-diabetes", Superimposed Bilateral Carpal Tunnel Syndrome which is severe on the right, and Cervical radiculopathy best localizing to C6 bilaterally and C7 on the left. He has HTN and HLD.     He presents today for a follow up visit. Memory and ability to focus are much worse per daughter and son who are here with him today. No fall, hallucinations, or delusions.  He is eating well, and sleeps well at night; however, he is sleeping more during the day as well. No REM sleep behavior complaints today.     He becomes agitated more easily, when his daughter is instructing him to do something. He is less social, and does not want to go anywhere.     No report of neck pain today, and hand numbness is not bothersome. No numbness in the feet.     He has 24/7 supervision with daughter.     He received blood tranfusions for a lower GI bleed per daughter since his last visit. Mild anemia on recent labs per PCP.     ROS   Not reliable due to memory loss    Exam:  Gen Appearance, well developed/nourished in no apparent distress  CV: 2+ distal pulses with no edema or swelling  Neuro:  MS: Awake, alert, oriented to place, person, not to time (states December), not fully oriented to situation. Sustains attention. Recent recall is severely impaired/remote memory intact, Language is full to spontaneous speech/comprehension. Fund of Knowledge is full. Unable to name current President with prompting.   CN: Optic discs are flat with normal vasculature, PERRL, Extraoccular movements and visual fields are full. Normal facial s strength,  Tongue and Palate are midline and strong. Shoulder Shrug symmetric and strong.  Motor: Normal bulk, tone, no abnormal movements. " "5/5 strength bilateral upper/lower extremities with 2+ reflexes   Sensory: symmetric to  temp, and vibration, Romberg mildly positive  Cerebellar: Finger-nose,Heal-shin, Rapid alternating movements intact  Gait: Normal stance, no ataxia and uses cane well    Diagnostic testing:     EMG: Mild Axonal polyneuropathy effecting the hands, possibly from age or from patient's history of "pre-diabetes", Superimposed Bilateral Carpal Tunnel Syndrome which is severe on the right,  Cervical radiculopathy best localizing to C6 bilaterally and C7 on the left     CT head 11/2017: Age-appropriate generalized cerebral volume loss with mild/moderate  degree of patchy decreased attenuation supratentorial white matter suggestive for chronic microvascular ischemic change similarly seen on the prior.     No evidence for acute intracranial hemorrhage or definite new abnormal parenchymal attenuation. Clinical correlation and further evaluation as warranted.    Labs:   A1C 6.7 in 12/2017  B12 low normal 2017 8/2018 CBC reviewed-mild anemia    Assessment/Plan: Bjorn Gupta is a 89 y.o. Male with Alzheimer's dementia, with numbness in the hands. Some degenerative changes not unexpected for age on C spine x-ray.2013  EMG showed: Mild Axonal polyneuropathy effecting the hands, possibly from age or from patient's history of "pre-diabetes", Superimposed Bilateral Carpal Tunnel Syndrome which is severe on the right, and Cervical radiculopathy best localizing to C6 bilaterally and C7 on the left.     I recommend:   1. Start Zoloft 25 mg for agitation, which could represent depression, given his frequent naps, and reduced sociability, as of late.   2. Patient continues with  numbness in the hands (not bothersome) without  cervical radicular symptoms (he would defer C spine surgery at this point/ no active symptoms/ ). This is not responding well to brace but patient's symptoms do sound most consistent with CTS, but he declined CTR offered by " orthopedist.  3. He is now diagnosed with DM2. Continue good DM2 control to prevent worsening neuropathy.   4. Fall precautions reviewed- now walking with a cane.   5. Patient's memory continues to worsen over time.  Namenda helping to stabalize symptoms and he continues B12 for lower normal levels found. He lives with family and no longer drives  6. 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 Could consider medication if further bothersome.     RTC in 6 weeks

## 2018-09-21 DIAGNOSIS — I10 ESSENTIAL HYPERTENSION: ICD-10-CM

## 2018-09-24 ENCOUNTER — TELEPHONE (OUTPATIENT)
Dept: INTERNAL MEDICINE | Facility: CLINIC | Age: 83
End: 2018-09-24

## 2018-09-24 RX ORDER — METOPROLOL SUCCINATE 50 MG/1
TABLET, EXTENDED RELEASE ORAL
Qty: 30 TABLET | Refills: 5 | Status: SHIPPED | OUTPATIENT
Start: 2018-09-24 | End: 2019-01-07 | Stop reason: SDUPTHER

## 2018-09-24 RX ORDER — ROSUVASTATIN CALCIUM 20 MG/1
TABLET, COATED ORAL
Qty: 30 TABLET | Refills: 5 | Status: SHIPPED | OUTPATIENT
Start: 2018-09-24 | End: 2019-03-15 | Stop reason: SDUPTHER

## 2018-09-24 NOTE — TELEPHONE ENCOUNTER
----- Message from Raheel Mock sent at 2018  9:19 AM CDT -----  Contact: ZHOU / DAUGHTER  Bjorn Gupta  MRN: [id number]  : 1925  PCP: Arjun Lund  Home Phone      [phone]  Work Phone      Not on file.  Mobile          [phone]      MESSAGE: CALLING TO FIND OUT IF PT WILL NEED TO GET FLU SHOT THIS YEAR. PLEASE CALL     PHONE: [phone]

## 2018-09-24 NOTE — TELEPHONE ENCOUNTER
Advised Leyda to make sure Mr. Milan gets a flu vaccine every year.  Leyda verbally stated understanding.

## 2018-10-02 ENCOUNTER — CLINICAL SUPPORT (OUTPATIENT)
Dept: INTERNAL MEDICINE | Facility: CLINIC | Age: 83
End: 2018-10-02
Payer: MEDICARE

## 2018-10-02 DIAGNOSIS — D64.9 ANEMIA OF UNKNOWN ETIOLOGY: ICD-10-CM

## 2018-10-02 LAB
BASOPHILS # BLD AUTO: 0.03 K/UL
BASOPHILS NFR BLD: 0.8 %
DIFFERENTIAL METHOD: ABNORMAL
EOSINOPHIL # BLD AUTO: 0.1 K/UL
EOSINOPHIL NFR BLD: 3.1 %
ERYTHROCYTE [DISTWIDTH] IN BLOOD BY AUTOMATED COUNT: 16.6 %
HCT VFR BLD AUTO: 39.8 %
HGB BLD-MCNC: 12.4 G/DL
LYMPHOCYTES # BLD AUTO: 0.9 K/UL
LYMPHOCYTES NFR BLD: 23.3 %
MCH RBC QN AUTO: 28.9 PG
MCHC RBC AUTO-ENTMCNC: 31.2 G/DL
MCV RBC AUTO: 93 FL
MONOCYTES # BLD AUTO: 0.5 K/UL
MONOCYTES NFR BLD: 12.6 %
NEUTROPHILS # BLD AUTO: 2.4 K/UL
NEUTROPHILS NFR BLD: 60.2 %
PLATELET # BLD AUTO: 113 K/UL
PMV BLD AUTO: 10.4 FL
RBC # BLD AUTO: 4.29 M/UL
WBC # BLD AUTO: 3.9 K/UL

## 2018-10-02 PROCEDURE — 99211 OFF/OP EST MAY X REQ PHY/QHP: CPT | Mod: PBBFAC

## 2018-10-02 PROCEDURE — 99999 PR PBB SHADOW E&M-EST. PATIENT-LVL I: CPT | Mod: PBBFAC,,,

## 2018-10-02 PROCEDURE — 99999 PR STA SHADOW: CPT | Mod: PBBFAC,,,

## 2018-10-02 PROCEDURE — 85025 COMPLETE CBC W/AUTO DIFF WBC: CPT

## 2018-10-02 PROCEDURE — 36415 COLL VENOUS BLD VENIPUNCTURE: CPT | Mod: PBBFAC

## 2018-10-18 ENCOUNTER — OFFICE VISIT (OUTPATIENT)
Dept: NEUROLOGY | Facility: CLINIC | Age: 83
End: 2018-10-18
Payer: MEDICARE

## 2018-10-18 VITALS
HEART RATE: 56 BPM | SYSTOLIC BLOOD PRESSURE: 132 MMHG | HEIGHT: 66 IN | DIASTOLIC BLOOD PRESSURE: 62 MMHG | RESPIRATION RATE: 16 BRPM | BODY MASS INDEX: 28.84 KG/M2 | WEIGHT: 179.44 LBS

## 2018-10-18 DIAGNOSIS — F02.81 ALZHEIMER'S DEMENTIA WITH BEHAVIORAL DISTURBANCE, UNSPECIFIED TIMING OF DEMENTIA ONSET: Primary | ICD-10-CM

## 2018-10-18 DIAGNOSIS — I71.40 ABDOMINAL AORTIC ANEURYSM (AAA) WITHOUT RUPTURE: ICD-10-CM

## 2018-10-18 DIAGNOSIS — M54.12 CERVICAL RADICULOPATHY: ICD-10-CM

## 2018-10-18 DIAGNOSIS — I25.10 CORONARY ARTERY DISEASE, ANGINA PRESENCE UNSPECIFIED, UNSPECIFIED VESSEL OR LESION TYPE, UNSPECIFIED WHETHER NATIVE OR TRANSPLANTED HEART: ICD-10-CM

## 2018-10-18 DIAGNOSIS — R45.1 AGITATION: ICD-10-CM

## 2018-10-18 DIAGNOSIS — E78.5 HYPERLIPIDEMIA, UNSPECIFIED HYPERLIPIDEMIA TYPE: ICD-10-CM

## 2018-10-18 DIAGNOSIS — I48.91 ATRIAL FIBRILLATION WITH NORMAL VENTRICULAR RATE: ICD-10-CM

## 2018-10-18 DIAGNOSIS — K92.2 GASTROINTESTINAL HEMORRHAGE, UNSPECIFIED GASTROINTESTINAL HEMORRHAGE TYPE: ICD-10-CM

## 2018-10-18 DIAGNOSIS — I10 ESSENTIAL HYPERTENSION: ICD-10-CM

## 2018-10-18 DIAGNOSIS — G30.9 ALZHEIMER'S DEMENTIA WITH BEHAVIORAL DISTURBANCE, UNSPECIFIED TIMING OF DEMENTIA ONSET: Primary | ICD-10-CM

## 2018-10-18 DIAGNOSIS — G56.00 CARPAL TUNNEL SYNDROME, UNSPECIFIED LATERALITY: ICD-10-CM

## 2018-10-18 DIAGNOSIS — E11.9 CONTROLLED TYPE 2 DIABETES MELLITUS WITHOUT COMPLICATION, WITHOUT LONG-TERM CURRENT USE OF INSULIN: ICD-10-CM

## 2018-10-18 PROCEDURE — 99214 OFFICE O/P EST MOD 30 MIN: CPT | Mod: S$PBB | Performed by: NURSE PRACTITIONER

## 2018-10-18 PROCEDURE — 99214 OFFICE O/P EST MOD 30 MIN: CPT | Mod: PBBFAC | Performed by: NURSE PRACTITIONER

## 2018-10-18 PROCEDURE — 99999 PR PBB SHADOW E&M-EST. PATIENT-LVL IV: CPT | Mod: PBBFAC,,, | Performed by: NURSE PRACTITIONER

## 2018-10-18 PROCEDURE — 99999 PR STA SHADOW: CPT | Mod: PBBFAC,,, | Performed by: NURSE PRACTITIONER

## 2018-10-18 NOTE — PROGRESS NOTES
"HPI: Bjorn Gupta is a 89 y.o. Male with Alzheimer's dementia, with numbness in the hands. Some degenerative changes not unexpected for age on C spine x-ray. 2013  EMG showed: Mild Axonal polyneuropathy effecting the hands, possibly from age or from patient's history of "pre-diabetes", Superimposed Bilateral Carpal Tunnel Syndrome which is severe on the right, and Cervical radiculopathy best localizing to C6 bilaterally and C7 on the left. He has HTN, HLD, AAA ET, A-fib, borderline DM, and CAD. GI bleed in 2018, with transfusion.     He presents today for a follow up visit. Zoloft started at his last visit for agitation, which has been very effective. He is no longer sleeping at much during the day. He continues to be less social, which daughter believes is related to his hearing loss, which is not effectively helped with hearing aids.     No REM sleep behavior complaints today.     No report of neck pain today, and hand numbness is not bothersome. No numbness in the feet.     He has 24/7 supervision with daughter.     ROS   Not reliable due to memory loss    Exam:  Gen Appearance, well developed/nourished in no apparent distress  CV: 2+ distal pulses with no edema or swelling  Neuro:  MS: Awake, alert, oriented to place, person, and time as October, fully oriented to situation. Sustains attention. Recent recall is severely impaired/remote memory intact, Language is full to spontaneous speech/comprehension. Fund of Knowledge is full. Unable to name current President with prompting.   CN: Optic discs are flat with normal vasculature, PERRL, Extraoccular movements and visual fields are full. Normal facial s strength,  Tongue and Palate are midline and strong. Shoulder Shrug symmetric and strong.  Motor: Normal bulk, tone, no abnormal movements. 5/5 strength bilateral upper/lower extremities with 2+ reflexes   Sensory: symmetric to  temp, and vibration, Romberg mildly positive  Cerebellar: Finger-nose,Heal-shin, " "Rapid alternating movements intact  Gait: Normal stance, no ataxia and uses cane well    Diagnostic testing:   EMG: Mild Axonal polyneuropathy effecting the hands, possibly from age or from patient's history of "pre-diabetes", Superimposed Bilateral Carpal Tunnel Syndrome which is severe on the right,  Cervical radiculopathy best localizing to C6 bilaterally and C7 on the left     CT head 11/2017: Age-appropriate generalized cerebral volume loss with mild/moderate  degree of patchy decreased attenuation supratentorial white matter suggestive for chronic microvascular ischemic change similarly seen on the prior.     No evidence for acute intracranial hemorrhage or definite new abnormal parenchymal attenuation. Clinical correlation and further evaluation as warranted.    Labs:   A1C 6.7 in 12/2017  B12 low normal 2017 8/2018 CBC reviewed-mild anemia    Assessment/Plan: Bjorn Gupta is a 89 y.o. Male with Alzheimer's dementia, with numbness in the hands. Some degenerative changes not unexpected for age on C spine x-ray. 2013  EMG showed: Mild Axonal polyneuropathy effecting the hands, possibly from age or from patient's history of "pre-diabetes", Superimposed Bilateral Carpal Tunnel Syndrome which is severe on the right, and Cervical radiculopathy best localizing to C6 bilaterally and C7 on the left.     I recommend:   1. Continue Zoloft 25 mg for agitation. Not sleeping as much during the day.   2. Patient continues with  numbness in the hands (not bothersome) without  cervical radicular symptoms (he would defer C spine surgery at this point/ no active symptoms/ ). This is not responding well to brace but patient's symptoms do sound most consistent with CTS, but he declined CTR offered by orthopedist.  3. He is now diagnosed with borderline DM. Continue good DM2 control to prevent worsening neuropathy.   4. Fall precautions reviewed- now walking with a cane.   5. Patient's memory continues to worsen over time.  " Namenda helping to stabalize symptoms and he continues B12 for lower normal levels found. He lives with family and no longer drives  6. 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 Could consider medication if further bothersome.   7. Unable to tolerate anticoagulation for A-fib, given his GI bleed.     RTC in 6 months

## 2018-11-29 ENCOUNTER — OFFICE VISIT (OUTPATIENT)
Dept: INTERNAL MEDICINE | Facility: CLINIC | Age: 83
End: 2018-11-29
Payer: MEDICARE

## 2018-11-29 VITALS
DIASTOLIC BLOOD PRESSURE: 58 MMHG | HEART RATE: 51 BPM | HEIGHT: 66 IN | BODY MASS INDEX: 27.88 KG/M2 | OXYGEN SATURATION: 97 % | SYSTOLIC BLOOD PRESSURE: 90 MMHG | RESPIRATION RATE: 14 BRPM | WEIGHT: 173.5 LBS

## 2018-11-29 DIAGNOSIS — R29.6 RECURRENT FALLS: ICD-10-CM

## 2018-11-29 DIAGNOSIS — R53.81 DEBILITY: ICD-10-CM
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
## 2018-11-29 PROCEDURE — 99999 PR PBB SHADOW E&M-EST. PATIENT-LVL III: CPT | Mod: PBBFAC,,, | Performed by: INTERNAL MEDICINE

## 2018-11-29 PROCEDURE — 99999 PR STA SHADOW: CPT | Mod: PBBFAC,,, | Performed by: INTERNAL MEDICINE

## 2018-11-29 PROCEDURE — 99213 OFFICE O/P EST LOW 20 MIN: CPT | Mod: PBBFAC | Performed by: INTERNAL MEDICINE

## 2018-11-29 PROCEDURE — 99213 OFFICE O/P EST LOW 20 MIN: CPT | Mod: S$PBB | Performed by: INTERNAL MEDICINE

## 2018-11-29 NOTE — PROGRESS NOTES
Subjective:       Patient ID: Bjorn Gupta is a 89 y.o. male.    Chief Complaint: Hyperlipidemia (3 MONTH FOLLOW UP WITH LAB REVIEW); Hypertension; Diabetes; and Fall (10/31/18)    Bjorn Gupta is a 89 y.o. male  With alzhemire dementia   With a recent fall .  No LOC , no significant damage to body ; fe bruises .    He is slow to walk . Also some  Vision issues all contribute to his fall.    I discussed with daughters need for PT .          Review of Systems   Constitutional: Negative for chills and fever.   HENT: Negative for congestion, postnasal drip and sore throat.    Eyes: Negative for photophobia.   Respiratory: Negative for chest tightness and shortness of breath.    Cardiovascular: Negative for chest pain and palpitations.   Gastrointestinal: Negative for abdominal distention, abdominal pain, blood in stool and vomiting.   Genitourinary: Negative for dysuria, flank pain and hematuria.   Musculoskeletal: Negative for back pain and myalgias.   Skin: Negative for pallor.   Neurological: Negative for dizziness, seizures, facial asymmetry, speech difficulty and numbness.   Hematological: Does not bruise/bleed easily.   Psychiatric/Behavioral: Negative for agitation and suicidal ideas. The patient is not nervous/anxious.        Objective:      Physical Exam   Constitutional: He is oriented to person, place, and time. He appears well-developed and well-nourished.   HENT:   Head: Normocephalic and atraumatic.   Nose: Nose normal.   Mouth/Throat: Oropharynx is clear and moist.   Eyes: Conjunctivae and EOM are normal. Pupils are equal, round, and reactive to light.   Neck: Normal range of motion. Neck supple. No JVD present. No thyromegaly present.   Cardiovascular: Normal rate, regular rhythm, normal heart sounds and intact distal pulses.   Pulmonary/Chest: Effort normal and breath sounds normal.   Abdominal: Soft. Bowel sounds are normal. He exhibits no distension and no mass. There is no tenderness. There is  no guarding.   Musculoskeletal: Normal range of motion.   Bilateral  Edema legs   Lymphadenopathy:     He has no cervical adenopathy.   Neurological: He is alert and oriented to person, place, and time. He has normal reflexes. No cranial nerve deficit.   Skin: Skin is warm and dry. No rash noted. No pallor.   Elbow bruising; small healing bruise R knee      Psychiatric: He has a normal mood and affect.   Nursing note and vitals reviewed.      Assessment:       1. Fall, initial encounter        Plan:   Bjorn was seen today for hyperlipidemia, hypertension, diabetes and fall.    Diagnoses and all orders for this visit:    Fall, initial encounter  -     Ambulatory referral to Home Health    Recurrent falls  -     Ambulatory referral to Home Health    Debility  -     Ambulatory referral to Home Health    he needs PT   Will ask home health and HOME PT>  Walk with walker and cane    Problem List Items Addressed This Visit     None      Visit Diagnoses     Fall, initial encounter    -  Primary

## 2018-12-26 ENCOUNTER — TELEPHONE (OUTPATIENT)
Dept: ADMINISTRATIVE | Facility: CLINIC | Age: 83
End: 2018-12-26

## 2018-12-26 NOTE — TELEPHONE ENCOUNTER
Home Health SOC 11/30/2018 - 01/28/2019 with [facility] ([place] - Dr. Arjun Lund. Patient received PT services.

## 2019-01-02 ENCOUNTER — CLINICAL SUPPORT (OUTPATIENT)
Dept: INTERNAL MEDICINE | Facility: CLINIC | Age: 84
End: 2019-01-02
Payer: MEDICARE

## 2019-01-02 DIAGNOSIS — I10 ESSENTIAL HYPERTENSION: ICD-10-CM

## 2019-01-02 DIAGNOSIS — D64.9 ANEMIA OF UNKNOWN ETIOLOGY: ICD-10-CM

## 2019-01-02 DIAGNOSIS — I48.91 ATRIAL FIBRILLATION WITH NORMAL VENTRICULAR RATE: ICD-10-CM

## 2019-01-02 DIAGNOSIS — D69.6 THROMBOCYTOPENIA: ICD-10-CM

## 2019-01-02 DIAGNOSIS — E78.5 HYPERLIPIDEMIA, UNSPECIFIED HYPERLIPIDEMIA TYPE: ICD-10-CM

## 2019-01-02 DIAGNOSIS — I71.40 ABDOMINAL AORTIC ANEURYSM (AAA) WITHOUT RUPTURE: ICD-10-CM

## 2019-01-02 DIAGNOSIS — E11.9 CONTROLLED TYPE 2 DIABETES MELLITUS WITHOUT COMPLICATION, WITHOUT LONG-TERM CURRENT USE OF INSULIN: ICD-10-CM

## 2019-01-02 LAB
ALBUMIN SERPL BCP-MCNC: 3.7 G/DL
ALBUMIN/CREAT UR: 70.2 UG/MG
ALP SERPL-CCNC: 132 U/L
ALT SERPL W/O P-5'-P-CCNC: 24 U/L
ANION GAP SERPL CALC-SCNC: 8 MMOL/L
AST SERPL-CCNC: 22 U/L
BASOPHILS # BLD AUTO: 0.02 K/UL
BASOPHILS NFR BLD: 0.5 %
BILIRUB SERPL-MCNC: 0.8 MG/DL
BUN SERPL-MCNC: 19 MG/DL
CALCIUM SERPL-MCNC: 9.5 MG/DL
CHLORIDE SERPL-SCNC: 106 MMOL/L
CHOLEST SERPL-MCNC: 149 MG/DL
CHOLEST/HDLC SERPL: 3.2 {RATIO}
CO2 SERPL-SCNC: 28 MMOL/L
CREAT SERPL-MCNC: 1.2 MG/DL
CREAT UR-MCNC: 131 MG/DL
DIFFERENTIAL METHOD: ABNORMAL
EOSINOPHIL # BLD AUTO: 0.1 K/UL
EOSINOPHIL NFR BLD: 3.3 %
ERYTHROCYTE [DISTWIDTH] IN BLOOD BY AUTOMATED COUNT: 16.4 %
EST. GFR  (AFRICAN AMERICAN): 60 ML/MIN/1.73 M^2
EST. GFR  (NON AFRICAN AMERICAN): 52 ML/MIN/1.73 M^2
ESTIMATED AVG GLUCOSE: 131 MG/DL
GLUCOSE SERPL-MCNC: 109 MG/DL
HBA1C MFR BLD HPLC: 6.2 %
HCT VFR BLD AUTO: 41.7 %
HDLC SERPL-MCNC: 46 MG/DL
HDLC SERPL: 30.9 %
HGB BLD-MCNC: 13.3 G/DL
LDLC SERPL CALC-MCNC: 83.6 MG/DL
LYMPHOCYTES # BLD AUTO: 0.9 K/UL
LYMPHOCYTES NFR BLD: 20.8 %
MCH RBC QN AUTO: 30.4 PG
MCHC RBC AUTO-ENTMCNC: 31.9 G/DL
MCV RBC AUTO: 95 FL
MICROALBUMIN UR DL<=1MG/L-MCNC: 92 UG/ML
MONOCYTES # BLD AUTO: 0.5 K/UL
MONOCYTES NFR BLD: 10.7 %
NEUTROPHILS # BLD AUTO: 2.8 K/UL
NEUTROPHILS NFR BLD: 64.7 %
NONHDLC SERPL-MCNC: 103 MG/DL
PLATELET # BLD AUTO: 117 K/UL
PMV BLD AUTO: 10.3 FL
POTASSIUM SERPL-SCNC: 4.7 MMOL/L
PROT SERPL-MCNC: 7.1 G/DL
RBC # BLD AUTO: 4.37 M/UL
SODIUM SERPL-SCNC: 142 MMOL/L
TRIGL SERPL-MCNC: 97 MG/DL
TSH SERPL DL<=0.005 MIU/L-ACNC: 3.67 UIU/ML
WBC # BLD AUTO: 4.28 K/UL

## 2019-01-02 PROCEDURE — 80053 COMPREHEN METABOLIC PANEL: CPT

## 2019-01-02 PROCEDURE — 99999 PR STA SHADOW: CPT | Mod: PBBFAC,,,

## 2019-01-02 PROCEDURE — 85025 COMPLETE CBC W/AUTO DIFF WBC: CPT

## 2019-01-02 PROCEDURE — 80061 LIPID PANEL: CPT

## 2019-01-02 PROCEDURE — 82043 UR ALBUMIN QUANTITATIVE: CPT

## 2019-01-02 PROCEDURE — 99999 PR PBB SHADOW E&M-EST. PATIENT-LVL I: CPT | Mod: PBBFAC,,,

## 2019-01-02 PROCEDURE — 99999 PR STA SHADOW: ICD-10-PCS | Mod: PBBFAC,,,

## 2019-01-02 PROCEDURE — 36415 COLL VENOUS BLD VENIPUNCTURE: CPT | Mod: PBBFAC

## 2019-01-02 PROCEDURE — 84443 ASSAY THYROID STIM HORMONE: CPT

## 2019-01-02 PROCEDURE — 83036 HEMOGLOBIN GLYCOSYLATED A1C: CPT

## 2019-01-02 PROCEDURE — 99211 OFF/OP EST MAY X REQ PHY/QHP: CPT | Mod: PBBFAC

## 2019-01-02 NOTE — PROGRESS NOTES
Venipuncture Collected.     Number of Sticks: 1  Site #1: Left Antecubital  Site #2: N/A  Site #3: N/A    Complications? None  Additional Comments:

## 2019-01-07 ENCOUNTER — OFFICE VISIT (OUTPATIENT)
Dept: INTERNAL MEDICINE | Facility: CLINIC | Age: 84
End: 2019-01-07
Payer: MEDICARE

## 2019-01-07 VITALS
HEART RATE: 52 BPM | BODY MASS INDEX: 27.92 KG/M2 | HEIGHT: 66 IN | SYSTOLIC BLOOD PRESSURE: 108 MMHG | WEIGHT: 173.75 LBS | OXYGEN SATURATION: 97 % | RESPIRATION RATE: 14 BRPM | DIASTOLIC BLOOD PRESSURE: 62 MMHG

## 2019-01-07 DIAGNOSIS — I10 ESSENTIAL HYPERTENSION: Primary | ICD-10-CM

## 2019-01-07 DIAGNOSIS — D69.6 THROMBOCYTOPENIA: ICD-10-CM

## 2019-01-07 DIAGNOSIS — G30.9 ALZHEIMER'S DEMENTIA WITH BEHAVIORAL DISTURBANCE, UNSPECIFIED TIMING OF DEMENTIA ONSET: ICD-10-CM

## 2019-01-07 DIAGNOSIS — F02.81 ALZHEIMER'S DEMENTIA WITH BEHAVIORAL DISTURBANCE, UNSPECIFIED TIMING OF DEMENTIA ONSET: ICD-10-CM

## 2019-01-07 DIAGNOSIS — I48.91 ATRIAL FIBRILLATION WITH NORMAL VENTRICULAR RATE: ICD-10-CM

## 2019-01-07 DIAGNOSIS — E11.9 CONTROLLED TYPE 2 DIABETES MELLITUS WITHOUT COMPLICATION, WITHOUT LONG-TERM CURRENT USE OF INSULIN: ICD-10-CM

## 2019-01-07 DIAGNOSIS — R60.0 EDEMA OF BOTH LEGS: ICD-10-CM

## 2019-01-07 PROCEDURE — 99213 OFFICE O/P EST LOW 20 MIN: CPT | Mod: PBBFAC | Performed by: INTERNAL MEDICINE

## 2019-01-07 PROCEDURE — 99999 PR STA SHADOW: ICD-10-PCS | Mod: PBBFAC,,, | Performed by: INTERNAL MEDICINE

## 2019-01-07 PROCEDURE — 99214 OFFICE O/P EST MOD 30 MIN: CPT | Mod: S$PBB | Performed by: INTERNAL MEDICINE

## 2019-01-07 PROCEDURE — 99999 PR PBB SHADOW E&M-EST. PATIENT-LVL III: CPT | Mod: PBBFAC,,, | Performed by: INTERNAL MEDICINE

## 2019-01-07 PROCEDURE — 99999 PR STA SHADOW: CPT | Mod: PBBFAC,,, | Performed by: INTERNAL MEDICINE

## 2019-01-07 RX ORDER — OMEPRAZOLE 20 MG/1
20 TABLET, DELAYED RELEASE ORAL EVERY MORNING
Qty: 90 TABLET | Refills: 1 | COMMUNITY
Start: 2019-01-07

## 2019-01-07 RX ORDER — SERTRALINE HYDROCHLORIDE 25 MG/1
25 TABLET, FILM COATED ORAL DAILY
Qty: 30 TABLET | Refills: 11 | Status: SHIPPED | OUTPATIENT
Start: 2019-01-07 | End: 2020-03-02

## 2019-01-07 RX ORDER — METOPROLOL SUCCINATE 50 MG/1
50 TABLET, EXTENDED RELEASE ORAL DAILY
Qty: 30 TABLET | Refills: 5 | Status: SHIPPED | OUTPATIENT
Start: 2019-01-07 | End: 2019-09-05 | Stop reason: SDUPTHER

## 2019-01-07 RX ORDER — FUROSEMIDE 20 MG/1
20 TABLET ORAL DAILY
Qty: 30 TABLET | Refills: 5 | Status: SHIPPED | OUTPATIENT
Start: 2019-01-07 | End: 2019-08-08 | Stop reason: SDUPTHER

## 2019-01-07 RX ORDER — MEMANTINE HYDROCHLORIDE 5 MG/1
5 TABLET ORAL 2 TIMES DAILY
Qty: 60 TABLET | Refills: 11 | Status: SHIPPED | OUTPATIENT
Start: 2019-01-07 | End: 2020-01-20

## 2019-01-07 NOTE — PROGRESS NOTES
Subjective:       Patient ID: Bjorn Gupta is a 89 y.o. male.    Chief Complaint: Hyperlipidemia (follow up with lab review); Hypertension; and Diabetes    Bjorn Gupta is a 89 y.o. male who presents for  Hypertension, and Hyperlipidemia follow up. Labs were reviewed with patient today.      Hyperlipidemia   This is a chronic problem. The current episode started more than 1 year ago. The problem is controlled. Recent lipid tests were reviewed and are low. Exacerbating diseases include obesity. Pertinent negatives include no chest pain, leg pain, myalgias or shortness of breath. Current antihyperlipidemic treatment includes statins. The current treatment provides moderate improvement of lipids. Risk factors for coronary artery disease include male sex, dyslipidemia and hypertension.   Hypertension   This is a chronic problem. The current episode started more than 1 year ago. The problem has been waxing and waning since onset. The problem is controlled. Pertinent negatives include no chest pain, palpitations or shortness of breath. Risk factors for coronary artery disease include sedentary lifestyle, obesity and dyslipidemia. Past treatments include ACE inhibitors, lifestyle changes, beta blockers and central alpha agonists. The current treatment provides moderate improvement. Hypertensive end-organ damage includes CAD/MI.   Diabetes   Pertinent negatives for hypoglycemia include no dizziness, nervousness/anxiousness, pallor, seizures or speech difficulty. Pertinent negatives for diabetes include no chest pain.   Medication Refill   Pertinent negatives include no abdominal pain, chest pain, chills, congestion, fever, myalgias, numbness, sore throat or vomiting.   Coronary Artery Disease   Pertinent negatives include no chest pain, chest tightness, dizziness, palpitations or shortness of breath. Risk factors include hyperlipidemia and obesity.     Review of Systems   Constitutional: Negative for chills and fever.    HENT: Negative for congestion, postnasal drip and sore throat.    Eyes: Negative for photophobia.   Respiratory: Negative for chest tightness and shortness of breath.    Cardiovascular: Negative for chest pain and palpitations.   Gastrointestinal: Negative for abdominal distention, abdominal pain, blood in stool and vomiting.   Genitourinary: Negative for dysuria, flank pain and hematuria.   Musculoskeletal: Negative for back pain and myalgias.   Skin: Negative for pallor.   Neurological: Negative for dizziness, seizures, facial asymmetry, speech difficulty and numbness.   Hematological: Does not bruise/bleed easily.   Psychiatric/Behavioral: Negative for agitation and suicidal ideas. The patient is not nervous/anxious.        Objective:      Physical Exam   Constitutional: He is oriented to person, place, and time. He appears well-developed and well-nourished.   HENT:   Head: Normocephalic and atraumatic.   Nose: Nose normal.   Mouth/Throat: Oropharynx is clear and moist.   Eyes: Conjunctivae and EOM are normal. Pupils are equal, round, and reactive to light.   Neck: Normal range of motion. Neck supple. No JVD present. No thyromegaly present.   Cardiovascular: Normal rate, regular rhythm, normal heart sounds and intact distal pulses.   Pulmonary/Chest: Effort normal and breath sounds normal.   Abdominal: Soft. Bowel sounds are normal. He exhibits no distension and no mass. There is no tenderness. There is no guarding.   Musculoskeletal: Normal range of motion.   Bilateral  Edema legs   Lymphadenopathy:     He has no cervical adenopathy.   Neurological: He is alert and oriented to person, place, and time. He has normal reflexes. No cranial nerve deficit.   Skin: Skin is warm and dry. No rash noted. No pallor.   Psychiatric: He has a normal mood and affect.   Nursing note and vitals reviewed.      Assessment:       1. Essential hypertension    2. Thrombocytopenia    3. Atrial fibrillation with normal ventricular  rate    4. Controlled type 2 diabetes mellitus without complication, without long-term current use of insulin    5. Alzheimer's dementia with behavioral disturbance, unspecified timing of dementia onset        Plan:   Bjorn was seen today for hyperlipidemia, hypertension and diabetes.    Diagnoses and all orders for this visit:    Essential hypertension  -     CBC auto differential; Future  -     Comprehensive metabolic panel; Future  -     Lipid panel; Future    Well controlled.  Continue same medication and dose.  1. Keep weight close to ideal body weight.   2.   Avoid high salt foods (olives, pickles, smoked meats, salted potato chips, etc.).   Do not add salt to your food at the table.   Use only small amounts of salt when cooking.   3. Begin an exercise program. Discuss with your doctor what type of exercise program would be best for you. It doesn't have to be difficult. Even brisk walking for 20 minutes three times a week is a good form of exercise.   4. Avoid medicines which contain heart stimulants. This includes many cold and sinus decongestant pills and sprays as well as diet pills. Check the warnings about hypertension on the label. Stimulants such as amphetamine or cocaine could be lethal for someone with hypertension. Never take these.  Thrombocytopenia  -     CBC auto differential; Future  Will continue to watch     Atrial fibrillation with normal ventricular rate  -     TSH; Future  .stable    Controlled type 2 diabetes mellitus without complication, without long-term current use of insulin  -     Hemoglobin A1c; Future  -     Microalbumin/creatinine urine ratio; Future  Patient has controlled Diabetes .  We discussed about diet ;low in calories. Avoid sweats, sodas.  Also increasing activity;walking 2-3 miles a day.  Goal of  A1c  less than 7 % stressed.  Also goal of LDL less than 70 highlighted to patient.  Alzheimer's dementia with behavioral disturbance, unspecified timing of dementia  onset  Stable.  Essential hypertension  -     CBC auto differential; Future; Expected date: 07/06/2019  -     Comprehensive metabolic panel; Future; Expected date: 07/06/2019  -     Lipid panel; Future; Expected date: 07/06/2019  -     metoprolol succinate (TOPROL-XL) 50 MG 24 hr tablet; Take 1 tablet (50 mg total) by mouth once daily.  Dispense: 30 tablet; Refill: 5    Thrombocytopenia  -     CBC auto differential; Future; Expected date: 07/06/2019    Atrial fibrillation with normal ventricular rate  -     TSH; Future; Expected date: 07/06/2019    Controlled type 2 diabetes mellitus without complication, without long-term current use of insulin  -     Hemoglobin A1c; Future; Expected date: 07/06/2019  -     Microalbumin/creatinine urine ratio; Future; Expected date: 07/06/2019    Alzheimer's dementia with behavioral disturbance, unspecified timing of dementia onset  -     sertraline (ZOLOFT) 25 MG tablet; Take 1 tablet (25 mg total) by mouth once daily.  Dispense: 30 tablet; Refill: 11    Edema of both legs  -     furosemide (LASIX) 20 MG tablet; Take 1 tablet (20 mg total) by mouth once daily.  Dispense: 30 tablet; Refill: 5    Other orders  -     memantine (NAMENDA) 5 MG Tab; Take 1 tablet (5 mg total) by mouth 2 (two) times daily.  Dispense: 60 tablet; Refill: 11  -     omeprazole (PRILOSEC OTC) 20 MG tablet; Take 1 tablet (20 mg total) by mouth every morning.  Dispense: 90 tablet; Refill: 1        Problem List Items Addressed This Visit     Thrombocytopenia    Essential hypertension - Primary    Atrial fibrillation with normal ventricular rate    Controlled type 2 diabetes mellitus without complication, without long-term current use of insulin    Alzheimer's dementia

## 2019-01-17 DIAGNOSIS — K92.2 GASTROINTESTINAL HEMORRHAGE, UNSPECIFIED GASTROINTESTINAL HEMORRHAGE TYPE: ICD-10-CM

## 2019-01-17 RX ORDER — FERROUS SULFATE 325(65) MG
TABLET ORAL
Qty: 90 TABLET | Refills: 5 | Status: SHIPPED | OUTPATIENT
Start: 2019-01-17 | End: 2019-07-08 | Stop reason: SDUPTHER

## 2019-01-28 ENCOUNTER — TELEPHONE (OUTPATIENT)
Dept: INTERNAL MEDICINE | Facility: CLINIC | Age: 84
End: 2019-01-28

## 2019-01-28 ENCOUNTER — HOSPITAL ENCOUNTER (OUTPATIENT)
Dept: RADIOLOGY | Facility: HOSPITAL | Age: 84
Discharge: HOME | End: 2019-01-28
Attending: NURSE PRACTITIONER
Payer: MEDICARE

## 2019-01-28 ENCOUNTER — OFFICE VISIT (OUTPATIENT)
Dept: INTERNAL MEDICINE | Facility: CLINIC | Age: 84
End: 2019-01-28
Payer: MEDICARE

## 2019-01-28 VITALS
OXYGEN SATURATION: 99 % | SYSTOLIC BLOOD PRESSURE: 128 MMHG | RESPIRATION RATE: 18 BRPM | DIASTOLIC BLOOD PRESSURE: 78 MMHG | WEIGHT: 175.94 LBS | BODY MASS INDEX: 28.27 KG/M2 | HEIGHT: 66 IN | HEART RATE: 62 BPM

## 2019-01-28 DIAGNOSIS — J20.9 ACUTE BRONCHITIS, UNSPECIFIED ORGANISM: Primary | ICD-10-CM

## 2019-01-28 DIAGNOSIS — F02.80 LATE ONSET ALZHEIMER'S DISEASE WITHOUT BEHAVIORAL DISTURBANCE: ICD-10-CM

## 2019-01-28 DIAGNOSIS — G30.1 LATE ONSET ALZHEIMER'S DISEASE WITHOUT BEHAVIORAL DISTURBANCE: ICD-10-CM

## 2019-01-28 DIAGNOSIS — J20.9 ACUTE BRONCHITIS, UNSPECIFIED ORGANISM: ICD-10-CM

## 2019-01-28 PROCEDURE — 96372 THER/PROPH/DIAG INJ SC/IM: CPT | Mod: PBBFAC

## 2019-01-28 PROCEDURE — 99213 OFFICE O/P EST LOW 20 MIN: CPT | Mod: PBBFAC,25 | Performed by: NURSE PRACTITIONER

## 2019-01-28 PROCEDURE — 99214 OFFICE O/P EST MOD 30 MIN: CPT | Mod: S$PBB | Performed by: NURSE PRACTITIONER

## 2019-01-28 PROCEDURE — 99999 PR STA SHADOW: CPT | Mod: PBBFAC,,, | Performed by: NURSE PRACTITIONER

## 2019-01-28 PROCEDURE — 99999 PR STA SHADOW: ICD-10-PCS | Mod: PBBFAC,,,

## 2019-01-28 PROCEDURE — 99999 PR STA SHADOW: CPT | Mod: PBBFAC,,,

## 2019-01-28 PROCEDURE — 71046 X-RAY EXAM CHEST 2 VIEWS: CPT | Mod: TC

## 2019-01-28 PROCEDURE — 99999 PR PBB SHADOW E&M-EST. PATIENT-LVL III: CPT | Mod: PBBFAC,,, | Performed by: NURSE PRACTITIONER

## 2019-01-28 PROCEDURE — 99999 PR PBB SHADOW E&M-EST. PATIENT-LVL III: ICD-10-PCS | Mod: PBBFAC,,, | Performed by: NURSE PRACTITIONER

## 2019-01-28 RX ORDER — METHYLPREDNISOLONE ACETATE 40 MG/ML
40 INJECTION, SUSPENSION INTRA-ARTICULAR; INTRALESIONAL; INTRAMUSCULAR; SOFT TISSUE
Status: COMPLETED | OUTPATIENT
Start: 2019-01-28 | End: 2019-01-28

## 2019-01-28 RX ORDER — DOXYCYCLINE 100 MG/1
100 CAPSULE ORAL EVERY 12 HOURS
Qty: 20 CAPSULE | Refills: 0 | Status: SHIPPED | OUTPATIENT
Start: 2019-01-28 | End: 2019-06-28

## 2019-01-28 RX ADMIN — METHYLPREDNISOLONE ACETATE 40 MG: 40 INJECTION, SUSPENSION INTRA-ARTICULAR; INTRALESIONAL; INTRAMUSCULAR; SOFT TISSUE at 02:01

## 2019-01-28 NOTE — PROGRESS NOTES
Subjective:       Patient ID: Bjorn Gupta is a 89 y.o. male.    Chief Complaint: Cough (non-productive cough, runny nose x1 week, denies fever, chills, nausea, vomiting, or any other symptoms. No relief with Robitussin Honey and Cough syrup with Codeine, family members state patient is sleeping more and seems confused )    HPI: Pt presents to clinic today known to Dr Gunn with c/o cough. He is here with his care takers daughter and daughter in law. Hey report that he stared about 1 week ago. Had slight sore throat. Cough is wet and worsening. Not brining anything up. Barking. No fever.  t max 99.5. Taking robitussin and codeine cough syrup. Has a chronic sinus drip.   Review of Systems   Constitutional: Positive for fatigue. Negative for activity change, chills and fever.        Low grade fever   HENT: Negative for congestion, postnasal drip, rhinorrhea and sore throat.    Eyes: Negative for pain, discharge and visual disturbance.   Respiratory: Positive for cough. Negative for shortness of breath and wheezing.    Cardiovascular: Negative for chest pain, palpitations and leg swelling.   Gastrointestinal: Negative for abdominal distention, abdominal pain, constipation, diarrhea, nausea and vomiting.   Musculoskeletal: Negative for back pain and joint swelling.   Skin: Negative for rash and wound.   Neurological: Negative for dizziness, syncope, weakness, light-headedness and headaches.   Psychiatric/Behavioral: Negative for confusion.       Objective:      Physical Exam   Constitutional: He is oriented to person, place, and time. He appears well-developed and well-nourished.   HENT:   Head: Normocephalic and atraumatic.   Right Ear: External ear normal.   Left Ear: External ear normal.   Nose: Nose normal.   Mouth/Throat: Oropharynx is clear and moist. No oropharyngeal exudate.   Eyes: Pupils are equal, round, and reactive to light.   Neck: Normal range of motion. Neck supple. No thyromegaly present.    Cardiovascular: Normal rate, regular rhythm, normal heart sounds and intact distal pulses.   No murmur heard.  Pulmonary/Chest: Effort normal. He has no wheezes.   Loose rhonchi clears some with cough left lower lobe   Abdominal: Soft. Bowel sounds are normal. He exhibits no distension and no mass. There is no tenderness. There is no guarding.   Musculoskeletal: Normal range of motion. He exhibits no edema.   Neurological: He is alert and oriented to person, place, and time. He has normal reflexes.   Skin: Skin is warm and dry. Capillary refill takes less than 2 seconds. There is erythema.   Very trace pitting edema bilateral lower ext   Psychiatric: He has a normal mood and affect.   Nursing note and vitals reviewed.      Assessment:       1. Acute bronchitis, unspecified organism    2. Late onset Alzheimer's disease without behavioral disturbance        Plan:     Problem List Items Addressed This Visit     Late onset Alzheimer's disease without behavioral disturbance      Other Visit Diagnoses     Acute bronchitis, unspecified organism    -  Primary    Relevant Medications    methylPREDNISolone acetate injection 40 mg (Start on 1/28/2019  2:45 PM)    doxycycline (VIBRAMYCIN) 100 MG Cap    Other Relevant Orders    X-Ray Chest PA And Lateral

## 2019-01-28 NOTE — PROGRESS NOTES
Depo Medrol injection given LUOQ per order. No reaction noted. Patient instructed to wait 20 minutes after injection administration. Patient verbalized understanding.

## 2019-01-28 NOTE — TELEPHONE ENCOUNTER
----- Message from Opal Velasquez sent at 2019  8:08 AM CST -----  Contact: Leyda/Daughter  Bjorn Gupta  MRN: [id number]  : 1925  PCP: Arjun Lund  Home Phone      [phone]  Work Phone      Not on file.  Mobile          [phone]    MESSAGE:   Requesting appointment today for cough, sleepiness, no appetite that he's been having for about 3 or 4 days.    She would like to get him in to see doctor today if possible. Please call.    Phone: [phone]

## 2019-03-19 RX ORDER — ROSUVASTATIN CALCIUM 20 MG/1
TABLET, COATED ORAL
Qty: 30 TABLET | Refills: 5 | Status: SHIPPED | OUTPATIENT
Start: 2019-03-19 | End: 2019-09-05 | Stop reason: SDUPTHER

## 2019-04-25 ENCOUNTER — OFFICE VISIT (OUTPATIENT)
Dept: NEUROLOGY | Facility: CLINIC | Age: 84
End: 2019-04-25
Payer: MEDICARE

## 2019-04-25 VITALS
BODY MASS INDEX: 28.84 KG/M2 | DIASTOLIC BLOOD PRESSURE: 60 MMHG | WEIGHT: 179.44 LBS | RESPIRATION RATE: 14 BRPM | HEIGHT: 66 IN | HEART RATE: 59 BPM | SYSTOLIC BLOOD PRESSURE: 118 MMHG

## 2019-04-25 DIAGNOSIS — F02.80 LATE ONSET ALZHEIMER'S DISEASE WITHOUT BEHAVIORAL DISTURBANCE: Primary | ICD-10-CM

## 2019-04-25 DIAGNOSIS — E11.9 CONTROLLED TYPE 2 DIABETES MELLITUS WITHOUT COMPLICATION, WITHOUT LONG-TERM CURRENT USE OF INSULIN: ICD-10-CM

## 2019-04-25 DIAGNOSIS — E78.5 HYPERLIPIDEMIA, UNSPECIFIED HYPERLIPIDEMIA TYPE: ICD-10-CM

## 2019-04-25 DIAGNOSIS — G30.1 LATE ONSET ALZHEIMER'S DISEASE WITHOUT BEHAVIORAL DISTURBANCE: Primary | ICD-10-CM

## 2019-04-25 DIAGNOSIS — I10 ESSENTIAL HYPERTENSION: ICD-10-CM

## 2019-04-25 DIAGNOSIS — I48.91 ATRIAL FIBRILLATION WITH NORMAL VENTRICULAR RATE: ICD-10-CM

## 2019-04-25 DIAGNOSIS — K92.2 GASTROINTESTINAL HEMORRHAGE, UNSPECIFIED GASTROINTESTINAL HEMORRHAGE TYPE: ICD-10-CM

## 2019-04-25 DIAGNOSIS — G56.00 CARPAL TUNNEL SYNDROME, UNSPECIFIED LATERALITY: ICD-10-CM

## 2019-04-25 DIAGNOSIS — R45.1 AGITATION: ICD-10-CM

## 2019-04-25 DIAGNOSIS — M54.12 CERVICAL RADICULOPATHY: ICD-10-CM

## 2019-04-25 DIAGNOSIS — G47.52 REM SLEEP BEHAVIOR DISORDER: ICD-10-CM

## 2019-04-25 DIAGNOSIS — I25.10 CORONARY ARTERY DISEASE, ANGINA PRESENCE UNSPECIFIED, UNSPECIFIED VESSEL OR LESION TYPE, UNSPECIFIED WHETHER NATIVE OR TRANSPLANTED HEART: ICD-10-CM

## 2019-04-25 DIAGNOSIS — I71.40 ABDOMINAL AORTIC ANEURYSM (AAA) WITHOUT RUPTURE: ICD-10-CM

## 2019-04-25 PROBLEM — G30.9 ALZHEIMER'S DEMENTIA: Status: RESOLVED | Noted: 2018-09-06 | Resolved: 2019-04-25

## 2019-04-25 PROCEDURE — 99999 PR STA SHADOW: ICD-10-PCS | Mod: PBBFAC,,, | Performed by: NURSE PRACTITIONER

## 2019-04-25 PROCEDURE — 99999 PR STA SHADOW: CPT | Mod: PBBFAC,,, | Performed by: NURSE PRACTITIONER

## 2019-04-25 PROCEDURE — 99214 OFFICE O/P EST MOD 30 MIN: CPT | Mod: S$PBB | Performed by: NURSE PRACTITIONER

## 2019-04-25 PROCEDURE — 99214 OFFICE O/P EST MOD 30 MIN: CPT | Mod: PBBFAC | Performed by: NURSE PRACTITIONER

## 2019-04-25 PROCEDURE — 99999 PR PBB SHADOW E&M-EST. PATIENT-LVL IV: CPT | Mod: PBBFAC,,, | Performed by: NURSE PRACTITIONER

## 2019-04-25 NOTE — PROGRESS NOTES
"HPI: Bjorn Gupta is a 89 y.o. male with Alzheimer's dementia and complaint of numbness in the hands. Some degenerative changes not unexpected for age on C spine x-ray. 2013  EMG showed: Mild Axonal polyneuropathy effecting the hands, possibly from age or from patient's history of "pre-diabetes", Superimposed Bilateral Carpal Tunnel Syndrome which is severe on the right, and Cervical radiculopathy best localizing to C6 bilaterally and C7 on the left. He has HTN, HLD, AAA ET, A-fib, borderline DM, and CAD. GI bleed in 2018, with transfusion.     He presents today for a follow up visit. His memory is unchanged since the last visit. He had one fall since his last visit, a few months ago, from losing is balance. This resulted in an abrasion to his right arm. No CHI. He completed PT for gait and balance afterwards, which was very helpful.     He has 24/7 supervision with daughter.     Mood is euthymic on Zoloft, which was started in 2018 for agitation. No hallucinations.     He continues to be less social, which daughter believes is related to his hearing loss, which is not effectively helped with hearing aids.     No REM sleep behavior complaints today. His appetite is good per family. Weight is unchanged since his last visit.     No report of neck pain today, and hand numbness is not bothersome. No numbness in the feet.     ROS   Not reliable due to memory loss    Exam:  Gen Appearance, well developed/nourished in no apparent distress  CV: 2+ distal pulses with no edema or swelling  Neuro:  MS: Awake, alert, oriented to place, person, and time as October, fully oriented to situation. Sustains attention. Recent recall is severely impaired/remote memory intact, Language is full to spontaneous speech/comprehension. Fund of Knowledge is full. Unable to name current President with prompting.   CN: Optic discs are flat with normal vasculature, PERRL, Extraoccular movements and visual fields are full. Normal facial s " "strength,  Tongue and Palate are midline and strong. Shoulder Shrug symmetric and strong.  Motor: Normal bulk, tone, no abnormal movements. 5/5 strength bilateral upper/lower extremities with 2+ reflexes   Sensory: symmetric to  temp, and vibration, Romberg mildly positive  Cerebellar: Finger-nose,Heal-shin, Rapid alternating movements intact  Gait: Normal stance, no ataxia and uses cane well    Diagnostic testing:   EMG: Mild Axonal polyneuropathy effecting the hands, possibly from age or from patient's history of "pre-diabetes", Superimposed Bilateral Carpal Tunnel Syndrome which is severe on the right,  Cervical radiculopathy best localizing to C6 bilaterally and C7 on the left     CT head 11/2017: Age-appropriate generalized cerebral volume loss with mild/moderate  degree of patchy decreased attenuation supratentorial white matter suggestive for chronic microvascular ischemic change similarly seen on the prior.     No evidence for acute intracranial hemorrhage or definite new abnormal parenchymal attenuation. Clinical correlation and further evaluation as warranted.    Labs:   A1C 6.7 in 12/2017  B12 low normal 2017  8/2018 CBC reviewed-mild anemia  1/2019 HgA1c 6.2%    Assessment/Plan: Bjorn Gupta is a 89 y.o. male with Alzheimer's dementia, with numbness in the hands. Some degenerative changes not unexpected for age on C spine x-ray. 2013  EMG showed: Mild Axonal polyneuropathy effecting the hands, possibly from age or from patient's history of "pre-diabetes", Superimposed Bilateral Carpal Tunnel Syndrome which is severe on the right, and Cervical radiculopathy best localizing to C6 bilaterally and C7 on the left.     I recommend:   1. Continue Zoloft 25 mg for agitation (Rx taken over by PCP). Not sleeping as much during the day.   2. Patient continues with  numbness in the hands (not bothersome) without  cervical radicular symptoms (he would defer C spine surgery at this point/ no active symptoms/ ). " This is not responding well to brace but patient's symptoms do sound most consistent with CTS, but he declined CTR offered by orthopedist.  3. He is now diagnosed with borderline DM. Continue good DM2 control to prevent worsening neuropathy.   4. Fall precautions reviewed- now walking with a cane. Gait improved with PT, ordered after fall a few months ago.   5. Patient's memory continues to worsen over time.  Namenda (Rx taken over by PCP) helping to stabalize symptoms and he continues B12 for lower normal levels found. He lives with family and no longer drives  6. 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 Could consider medication if further bothersome.   7. Unable to tolerate anticoagulation for A-fib, given his GI bleed.     RTC in 6 months

## 2019-06-28 ENCOUNTER — TELEPHONE (OUTPATIENT)
Dept: INTERNAL MEDICINE | Facility: CLINIC | Age: 84
End: 2019-06-28

## 2019-06-28 ENCOUNTER — OFFICE VISIT (OUTPATIENT)
Dept: INTERNAL MEDICINE | Facility: CLINIC | Age: 84
End: 2019-06-28
Payer: MEDICARE

## 2019-06-28 VITALS
HEIGHT: 66 IN | SYSTOLIC BLOOD PRESSURE: 138 MMHG | DIASTOLIC BLOOD PRESSURE: 82 MMHG | RESPIRATION RATE: 16 BRPM | HEART RATE: 42 BPM | BODY MASS INDEX: 28.63 KG/M2 | TEMPERATURE: 98 F | WEIGHT: 178.13 LBS | OXYGEN SATURATION: 96 %

## 2019-06-28 DIAGNOSIS — J98.8 WHEEZING-ASSOCIATED RESPIRATORY INFECTION: Primary | ICD-10-CM

## 2019-06-28 PROCEDURE — 99999 PR STA SHADOW: CPT | Mod: PBBFAC,,,

## 2019-06-28 PROCEDURE — 99999 PR STA SHADOW: CPT | Mod: PBBFAC,,, | Performed by: NURSE PRACTITIONER

## 2019-06-28 PROCEDURE — 99214 OFFICE O/P EST MOD 30 MIN: CPT | Mod: PBBFAC | Performed by: NURSE PRACTITIONER

## 2019-06-28 PROCEDURE — 99214 OFFICE O/P EST MOD 30 MIN: CPT | Mod: S$PBB | Performed by: NURSE PRACTITIONER

## 2019-06-28 PROCEDURE — 99999 PR PBB SHADOW E&M-EST. PATIENT-LVL IV: CPT | Mod: PBBFAC,,, | Performed by: NURSE PRACTITIONER

## 2019-06-28 PROCEDURE — 99999 PR STA SHADOW: ICD-10-PCS | Mod: PBBFAC,,,

## 2019-06-28 PROCEDURE — 99999 PR PBB SHADOW E&M-EST. PATIENT-LVL IV: ICD-10-PCS | Mod: PBBFAC,,, | Performed by: NURSE PRACTITIONER

## 2019-06-28 PROCEDURE — 96372 THER/PROPH/DIAG INJ SC/IM: CPT | Mod: PBBFAC

## 2019-06-28 RX ORDER — CODEINE PHOSPHATE AND GUAIFENESIN 10; 100 MG/5ML; MG/5ML
5 SOLUTION ORAL 3 TIMES DAILY PRN
Qty: 120 ML | Refills: 1 | Status: SHIPPED | OUTPATIENT
Start: 2019-06-28 | End: 2019-07-08

## 2019-06-28 RX ORDER — METHYLPREDNISOLONE ACETATE 40 MG/ML
40 INJECTION, SUSPENSION INTRA-ARTICULAR; INTRALESIONAL; INTRAMUSCULAR; SOFT TISSUE ONCE
Status: COMPLETED | OUTPATIENT
Start: 2019-06-28 | End: 2019-06-28

## 2019-06-28 RX ORDER — DOXYCYCLINE 100 MG/1
100 CAPSULE ORAL EVERY 12 HOURS
Qty: 14 CAPSULE | Refills: 0 | Status: SHIPPED | OUTPATIENT
Start: 2019-06-28 | End: 2019-07-05

## 2019-06-28 RX ADMIN — METHYLPREDNISOLONE ACETATE 40 MG: 40 INJECTION, SUSPENSION INTRA-ARTICULAR; INTRALESIONAL; INTRAMUSCULAR; SOFT TISSUE at 03:06

## 2019-06-28 NOTE — TELEPHONE ENCOUNTER
Contacted pt daughter states that pt started with a sinus drip and cough. No fever. Made pt an apt for today at 3 pt daughter verbalized understanding

## 2019-06-28 NOTE — TELEPHONE ENCOUNTER
----- Message from Rosi Worrell sent at 2019  8:21 AM CDT -----  Contact: [facility] Gu[facility]  MRN: [id number]  : 1925  PCP: Arjun Lund  Home Phone      [phone]  Work Phone      Not on file.  Mobile          [phone]    MESSAGE:   She would like to speak to the nurse regarding the patients symptoms.Pt c/o cough, no fever. The symptoms began 19. She declined appointment prior to speaking to the nurse.     Phone # [phone]    [address] - [address] [facility] [address]

## 2019-06-28 NOTE — PROGRESS NOTES
Depo Medrol injection given RUOQ per order. No reaction noted. Patient instructed to wait 20 minutes, patient and daughter verbalized understanding.

## 2019-06-28 NOTE — PROGRESS NOTES
Subjective:       Patient ID: Bjorn Gupta is a 89 y.o. male.    Chief Complaint: Cough    Pt known to me. Presents with his daughter with c/o cough x2 days. Daughter states she had similar symptoms and pt started coughing after contact with her. Denies fever or any other complaints at this time.     Cough   This is a new problem. The current episode started in the past 7 days. The problem has been unchanged. The problem occurs every few minutes. The cough is non-productive. Associated symptoms include postnasal drip and rhinorrhea. Pertinent negatives include no chest pain, chills, ear congestion, ear pain, fever, headaches, heartburn, hemoptysis, myalgias, nasal congestion, rash, sore throat, shortness of breath, sweats, weight loss or wheezing. The symptoms are aggravated by lying down. He has tried nothing for the symptoms. His past medical history is significant for environmental allergies and pneumonia. There is no history of asthma, bronchitis, COPD or emphysema.     Review of Systems   Constitutional: Negative for activity change, appetite change, chills, fever, unexpected weight change and weight loss.   HENT: Positive for postnasal drip and rhinorrhea. Negative for congestion, ear discharge, ear pain, sinus pressure, sinus pain, sneezing, sore throat, trouble swallowing and voice change.    Eyes: Negative for pain and visual disturbance.   Respiratory: Positive for cough. Negative for hemoptysis, chest tightness, shortness of breath and wheezing.    Cardiovascular: Negative for chest pain, palpitations and leg swelling.   Gastrointestinal: Negative for abdominal pain, constipation, diarrhea, heartburn, nausea and vomiting.   Endocrine: Negative.    Genitourinary: Negative for difficulty urinating.   Musculoskeletal: Negative for arthralgias, gait problem and myalgias.   Skin: Negative for rash.   Allergic/Immunologic: Positive for environmental allergies.   Neurological: Negative for headaches.    Hematological: Negative.    Psychiatric/Behavioral: Negative.    All other systems reviewed and are negative.      Objective:      Physical Exam   Constitutional: He is oriented to person, place, and time. Vital signs are normal. He appears well-developed and well-nourished. He is cooperative. No distress.   HENT:   Head: Normocephalic and atraumatic.   Right Ear: Tympanic membrane, external ear and ear canal normal.   Left Ear: Tympanic membrane, external ear and ear canal normal.   Nose: Mucosal edema and rhinorrhea present.   Mouth/Throat: Uvula is midline, oropharynx is clear and moist and mucous membranes are normal. No uvula swelling.   Eyes: Pupils are equal, round, and reactive to light. Conjunctivae, EOM and lids are normal.   Neck: Trachea normal, normal range of motion and phonation normal. Neck supple.   Cardiovascular: Normal rate, regular rhythm and intact distal pulses.   Murmur heard.  Pulmonary/Chest: Effort normal. No accessory muscle usage or stridor. No respiratory distress. He has no decreased breath sounds. He has wheezes (end expiratory) in the right upper field, the right middle field and the right lower field. He has no rhonchi. He has no rales.   Abdominal: Soft. Normal appearance and bowel sounds are normal. There is no tenderness.   Neurological: He is alert and oriented to person, place, and time. No cranial nerve deficit.   Skin: Skin is warm, dry and intact. No rash noted.   Psychiatric: He has a normal mood and affect. His speech is normal and behavior is normal. Judgment and thought content normal. Cognition and memory are normal.   Nursing note and vitals reviewed.      Assessment:       1. Wheezing-associated respiratory infection        Plan:       1. Wheezing-associated respiratory infection  End expiratory wheezing noted otherwise breath sounds equal. CXR if fails to improve or worsens  Symptomatic therapy suggested: rest, increase fluids, gargle prn for sore throat, apply  heat to sinuses prn and call prn if symptoms persist or worsen.   - methylPREDNISolone acetate injection 40 mg  - doxycycline (MONODOX) 100 MG capsule; Take 1 capsule (100 mg total) by mouth every 12 (twelve) hours. for 7 days  Dispense: 14 capsule; Refill: 0  - guaifenesin-codeine 100-10 mg/5 ml (TUSSI-ORGANIDIN NR)  mg/5 mL syrup; Take 5 mLs by mouth 3 (three) times daily as needed for Cough or Congestion.  Dispense: 120 mL; Refill: 1         ROSIE Ly, FNP-C  Family/Internal Medicine  [facility]

## 2019-07-08 ENCOUNTER — CLINICAL SUPPORT (OUTPATIENT)
Dept: INTERNAL MEDICINE | Facility: CLINIC | Age: 84
End: 2019-07-08
Payer: MEDICARE

## 2019-07-08 DIAGNOSIS — I48.91 ATRIAL FIBRILLATION WITH NORMAL VENTRICULAR RATE: ICD-10-CM

## 2019-07-08 DIAGNOSIS — D64.9 ANEMIA OF UNKNOWN ETIOLOGY: ICD-10-CM

## 2019-07-08 DIAGNOSIS — I10 ESSENTIAL HYPERTENSION: ICD-10-CM

## 2019-07-08 DIAGNOSIS — D69.6 THROMBOCYTOPENIA: ICD-10-CM

## 2019-07-08 DIAGNOSIS — E78.5 HYPERLIPIDEMIA, UNSPECIFIED HYPERLIPIDEMIA TYPE: ICD-10-CM

## 2019-07-08 DIAGNOSIS — K92.2 GASTROINTESTINAL HEMORRHAGE, UNSPECIFIED GASTROINTESTINAL HEMORRHAGE TYPE: ICD-10-CM

## 2019-07-08 DIAGNOSIS — E11.9 CONTROLLED TYPE 2 DIABETES MELLITUS WITHOUT COMPLICATION, WITHOUT LONG-TERM CURRENT USE OF INSULIN: ICD-10-CM

## 2019-07-08 LAB
ALBUMIN SERPL BCP-MCNC: 3.8 G/DL (ref 3.5–5.2)
ALBUMIN/CREAT UR: 42.3 UG/MG (ref 0–30)
ALP SERPL-CCNC: 181 U/L (ref 55–135)
ALT SERPL W/O P-5'-P-CCNC: 35 U/L (ref 10–44)
ANION GAP SERPL CALC-SCNC: 10 MMOL/L (ref 8–16)
AST SERPL-CCNC: 31 U/L (ref 10–40)
BASOPHILS # BLD AUTO: 0.04 K/UL (ref 0–0.2)
BASOPHILS NFR BLD: 0.7 % (ref 0–1.9)
BILIRUB SERPL-MCNC: 0.8 MG/DL (ref 0.1–1)
BUN SERPL-MCNC: 20 MG/DL (ref 10–30)
CALCIUM SERPL-MCNC: 9.9 MG/DL (ref 8.7–10.5)
CHLORIDE SERPL-SCNC: 102 MMOL/L (ref 95–110)
CHOLEST SERPL-MCNC: 141 MG/DL (ref 120–199)
CHOLEST/HDLC SERPL: 3.4 {RATIO} (ref 2–5)
CO2 SERPL-SCNC: 28 MMOL/L (ref 23–29)
CREAT SERPL-MCNC: 1.2 MG/DL (ref 0.5–1.4)
CREAT UR-MCNC: 78 MG/DL (ref 23–375)
DIFFERENTIAL METHOD: ABNORMAL
EOSINOPHIL # BLD AUTO: 0.3 K/UL (ref 0–0.5)
EOSINOPHIL NFR BLD: 4.4 % (ref 0–8)
ERYTHROCYTE [DISTWIDTH] IN BLOOD BY AUTOMATED COUNT: 13.8 % (ref 11.5–14.5)
EST. GFR  (AFRICAN AMERICAN): 59 ML/MIN/1.73 M^2
EST. GFR  (NON AFRICAN AMERICAN): 51 ML/MIN/1.73 M^2
ESTIMATED AVG GLUCOSE: 131 MG/DL (ref 68–131)
GLUCOSE SERPL-MCNC: 99 MG/DL (ref 70–110)
HBA1C MFR BLD HPLC: 6.2 % (ref 4–5.6)
HCT VFR BLD AUTO: 44.4 % (ref 40–54)
HDLC SERPL-MCNC: 41 MG/DL (ref 40–75)
HDLC SERPL: 29.1 % (ref 20–50)
HGB BLD-MCNC: 14.1 G/DL (ref 14–18)
IMM GRANULOCYTES # BLD AUTO: 0.03 K/UL (ref 0–0.04)
IMM GRANULOCYTES NFR BLD AUTO: 0.5 % (ref 0–0.5)
LDLC SERPL CALC-MCNC: 76.8 MG/DL (ref 63–159)
LYMPHOCYTES # BLD AUTO: 1.6 K/UL (ref 1–4.8)
LYMPHOCYTES NFR BLD: 27.3 % (ref 18–48)
MCH RBC QN AUTO: 29.9 PG (ref 27–31)
MCHC RBC AUTO-ENTMCNC: 31.8 G/DL (ref 32–36)
MCV RBC AUTO: 94 FL (ref 82–98)
MICROALBUMIN UR DL<=1MG/L-MCNC: 33 UG/ML
MONOCYTES # BLD AUTO: 0.5 K/UL (ref 0.3–1)
MONOCYTES NFR BLD: 8 % (ref 4–15)
NEUTROPHILS # BLD AUTO: 3.5 K/UL (ref 1.8–7.7)
NEUTROPHILS NFR BLD: 59.1 % (ref 38–73)
NONHDLC SERPL-MCNC: 100 MG/DL
NRBC BLD-RTO: 0 /100 WBC
PLATELET # BLD AUTO: 150 K/UL (ref 150–350)
PMV BLD AUTO: 9.7 FL (ref 9.2–12.9)
POTASSIUM SERPL-SCNC: 4.5 MMOL/L (ref 3.5–5.1)
PROT SERPL-MCNC: 7.6 G/DL (ref 6–8.4)
RBC # BLD AUTO: 4.72 M/UL (ref 4.6–6.2)
SODIUM SERPL-SCNC: 140 MMOL/L (ref 136–145)
TRIGL SERPL-MCNC: 116 MG/DL (ref 30–150)
TSH SERPL DL<=0.005 MIU/L-ACNC: 3.31 UIU/ML (ref 0.4–4)
WBC # BLD AUTO: 5.87 K/UL (ref 3.9–12.7)

## 2019-07-08 PROCEDURE — 80053 COMPREHEN METABOLIC PANEL: CPT

## 2019-07-08 PROCEDURE — 99999 PR STA SHADOW: ICD-10-PCS | Mod: PBBFAC,,,

## 2019-07-08 PROCEDURE — 36415 COLL VENOUS BLD VENIPUNCTURE: CPT | Mod: PBBFAC

## 2019-07-08 PROCEDURE — 85025 COMPLETE CBC W/AUTO DIFF WBC: CPT

## 2019-07-08 PROCEDURE — 83036 HEMOGLOBIN GLYCOSYLATED A1C: CPT

## 2019-07-08 PROCEDURE — 99999 PR STA SHADOW: CPT | Mod: PBBFAC,,,

## 2019-07-08 PROCEDURE — 84443 ASSAY THYROID STIM HORMONE: CPT

## 2019-07-08 PROCEDURE — 82043 UR ALBUMIN QUANTITATIVE: CPT

## 2019-07-08 PROCEDURE — 80061 LIPID PANEL: CPT

## 2019-07-08 RX ORDER — FERROUS SULFATE 325(65) MG
TABLET ORAL
Qty: 90 TABLET | Refills: 2 | Status: SHIPPED | OUTPATIENT
Start: 2019-07-08 | End: 2019-10-05 | Stop reason: SDUPTHER

## 2019-07-11 ENCOUNTER — OFFICE VISIT (OUTPATIENT)
Dept: INTERNAL MEDICINE | Facility: CLINIC | Age: 84
End: 2019-07-11
Payer: MEDICARE

## 2019-07-11 ENCOUNTER — HOSPITAL ENCOUNTER (OUTPATIENT)
Dept: RADIOLOGY | Facility: HOSPITAL | Age: 84
Discharge: HOME | End: 2019-07-11
Attending: NURSE PRACTITIONER
Payer: MEDICARE

## 2019-07-11 VITALS
HEART RATE: 60 BPM | OXYGEN SATURATION: 93 % | HEIGHT: 66 IN | DIASTOLIC BLOOD PRESSURE: 70 MMHG | SYSTOLIC BLOOD PRESSURE: 138 MMHG | WEIGHT: 167.75 LBS | BODY MASS INDEX: 26.96 KG/M2 | RESPIRATION RATE: 16 BRPM

## 2019-07-11 DIAGNOSIS — E11.9 CONTROLLED TYPE 2 DIABETES MELLITUS WITHOUT COMPLICATION, WITHOUT LONG-TERM CURRENT USE OF INSULIN: ICD-10-CM

## 2019-07-11 DIAGNOSIS — I48.91 ATRIAL FIBRILLATION WITH NORMAL VENTRICULAR RATE: ICD-10-CM

## 2019-07-11 DIAGNOSIS — J41.1 BRONCHITIS, MUCOPURULENT RECURRENT: ICD-10-CM

## 2019-07-11 DIAGNOSIS — I10 ESSENTIAL HYPERTENSION: ICD-10-CM

## 2019-07-11 DIAGNOSIS — J41.1 BRONCHITIS, MUCOPURULENT RECURRENT: Primary | ICD-10-CM

## 2019-07-11 DIAGNOSIS — J30.0 VASOMOTOR RHINITIS: ICD-10-CM

## 2019-07-11 DIAGNOSIS — R53.83 FATIGUE, UNSPECIFIED TYPE: ICD-10-CM

## 2019-07-11 DIAGNOSIS — R35.0 FREQUENT URINATION: ICD-10-CM

## 2019-07-11 LAB
BILIRUB SERPL-MCNC: ABNORMAL MG/DL
BLOOD URINE, POC: ABNORMAL
COLOR, POC UA: YELLOW
GLUCOSE UR QL STRIP: NEGATIVE
KETONES UR QL STRIP: ABNORMAL
LEUKOCYTE ESTERASE URINE, POC: ABNORMAL
NITRITE, POC UA: ABNORMAL
PH, POC UA: 5
PROTEIN, POC: ABNORMAL
SPECIFIC GRAVITY, POC UA: 1.02
UROBILINOGEN, POC UA: NEGATIVE

## 2019-07-11 PROCEDURE — 81002 URINALYSIS NONAUTO W/O SCOPE: CPT | Mod: PBBFAC | Performed by: NURSE PRACTITIONER

## 2019-07-11 PROCEDURE — 99213 OFFICE O/P EST LOW 20 MIN: CPT | Mod: PBBFAC,25 | Performed by: NURSE PRACTITIONER

## 2019-07-11 PROCEDURE — 87086 URINE CULTURE/COLONY COUNT: CPT

## 2019-07-11 PROCEDURE — 71046 XR CHEST PA AND LATERAL: ICD-10-PCS | Mod: 26,,, | Performed by: RADIOLOGY

## 2019-07-11 PROCEDURE — 99999 POCT URINE DIPSTICK WITHOUT MICROSCOPE: CPT | Mod: PBBFAC,,, | Performed by: NURSE PRACTITIONER

## 2019-07-11 PROCEDURE — 99999 POCT URINE DIPSTICK WITHOUT MICROSCOPE: ICD-10-PCS | Mod: PBBFAC,,, | Performed by: NURSE PRACTITIONER

## 2019-07-11 PROCEDURE — 99999 PR STA SHADOW: CPT | Mod: PBBFAC,,, | Performed by: NURSE PRACTITIONER

## 2019-07-11 PROCEDURE — 71046 X-RAY EXAM CHEST 2 VIEWS: CPT | Mod: 26,,, | Performed by: RADIOLOGY

## 2019-07-11 PROCEDURE — 99214 OFFICE O/P EST MOD 30 MIN: CPT | Mod: S$PBB | Performed by: NURSE PRACTITIONER

## 2019-07-11 PROCEDURE — 99999 PR PBB SHADOW E&M-EST. PATIENT-LVL III: CPT | Mod: PBBFAC,,, | Performed by: NURSE PRACTITIONER

## 2019-07-11 PROCEDURE — 71046 X-RAY EXAM CHEST 2 VIEWS: CPT | Mod: TC

## 2019-07-11 RX ORDER — GUAIFENESIN 100 MG/5ML
200 SOLUTION ORAL 3 TIMES DAILY PRN
COMMUNITY
End: 2020-01-13

## 2019-07-11 RX ORDER — FLUTICASONE PROPIONATE 50 MCG
1 SPRAY, SUSPENSION (ML) NASAL DAILY
Qty: 16 G | Refills: 0 | Status: SHIPPED | OUTPATIENT
Start: 2019-07-11 | End: 2020-01-13

## 2019-07-11 RX ORDER — LEVOFLOXACIN 500 MG/1
500 TABLET, FILM COATED ORAL DAILY
Qty: 7 TABLET | Refills: 0 | Status: SHIPPED | OUTPATIENT
Start: 2019-07-11 | End: 2020-01-13

## 2019-07-11 RX ORDER — BENZONATATE 200 MG/1
200 CAPSULE ORAL 3 TIMES DAILY PRN
Qty: 30 CAPSULE | Refills: 0 | Status: SHIPPED | OUTPATIENT
Start: 2019-07-11 | End: 2019-07-21

## 2019-07-11 NOTE — PROGRESS NOTES
Subjective:           Patient ID: Bjorn Gupta is a 89 y.o. male.    Chief Complaint: Chest Congestion; Fatigue; and Cough    Bjorn Gupta is a 89 y.o. Male new to me; known to fellow providers; PMHX of CAD, Type II DM, HTN, Afib, Bronchitis  Here with c/o cough , congestion and fatigue.   Family reports that he was treated 6/28/19 with abx and cough rx; he improved but then earlier this week he started with worsening cough and congestion. Sounded terrible last few days per son. Sounds a little better today.     Alternating living with family members- has 3 children who takes care of him weekly   They report that he has Has chronic runny nose.     3rd episode of bronchitis since January       Does not like to drink a lot of water bc has to urinate frequently  Noting he is sleeping more of late, ^ fatigue     Review of Systems   Constitutional: Positive for fatigue. Negative for activity change, appetite change, chills, fever and unexpected weight change.   HENT: Positive for postnasal drip and rhinorrhea. Negative for congestion, ear discharge, ear pain, sinus pressure, sinus pain, sneezing, sore throat, trouble swallowing and voice change.    Eyes: Negative for pain and visual disturbance.   Respiratory: Positive for cough. Negative for chest tightness, shortness of breath and wheezing.    Cardiovascular: Negative for chest pain, palpitations and leg swelling.   Gastrointestinal: Negative for abdominal pain, constipation, diarrhea, nausea and vomiting.   Endocrine: Negative.    Genitourinary: Positive for frequency. Negative for difficulty urinating.   Musculoskeletal: Negative for arthralgias, gait problem and myalgias.   Skin: Negative for rash.   Allergic/Immunologic: Positive for environmental allergies.   Neurological: Negative for headaches.   Hematological: Negative.    Psychiatric/Behavioral: Negative.    All other systems reviewed and are negative.      Objective:      Physical Exam   Constitutional:  He is oriented to person, place, and time. Vital signs are normal. He appears well-developed and well-nourished. He is cooperative. No distress.   HENT:   Head: Normocephalic and atraumatic.   Right Ear: Tympanic membrane, external ear and ear canal normal.   Left Ear: Tympanic membrane, external ear and ear canal normal.   Nose: Mucosal edema and rhinorrhea present.   Mouth/Throat: Uvula is midline, oropharynx is clear and moist and mucous membranes are normal. No uvula swelling.   Eyes: Pupils are equal, round, and reactive to light. Conjunctivae, EOM and lids are normal.   Neck: Trachea normal, normal range of motion and phonation normal. Neck supple.   Cardiovascular: Normal rate, regular rhythm and intact distal pulses.   Murmur heard.  Pulmonary/Chest: Effort normal. No accessory muscle usage or stridor. No respiratory distress. He has no decreased breath sounds. He has no wheezes (end expiratory). He has rhonchi in the right upper field, the right middle field and the right lower field. He has no rales.   + rhonchi to right lung fields ; wet cough   Abdominal: Soft. Normal appearance and bowel sounds are normal. There is no tenderness.   Musculoskeletal: He exhibits no edema.   Neurological: He is alert and oriented to person, place, and time. No cranial nerve deficit.   Skin: Skin is warm, dry and intact. No rash noted.   Psychiatric: He has a normal mood and affect. His speech is normal and behavior is normal. Judgment and thought content normal. Cognition and memory are normal.   Nursing note and vitals reviewed.      Assessment:       1. Bronchitis, mucopurulent recurrent    2. Vasomotor rhinitis    3. Controlled type 2 diabetes mellitus without complication, without long-term current use of insulin    4. Atrial fibrillation with normal ventricular rate    5. Essential hypertension    6. Fatigue, unspecified type    7. Frequent urination        Plan:   Bjorn was seen today for chest congestion, fatigue  and cough.    Diagnoses and all orders for this visit:    Bronchitis, mucopurulent recurrent  -     levoFLOXacin (LEVAQUIN) 500 MG tablet; Take 1 tablet (500 mg total) by mouth once daily.  -     X-Ray Chest PA And Lateral; Future  -     benzonatate (TESSALON) 200 MG capsule; Take 1 capsule (200 mg total) by mouth 3 (three) times daily as needed for Cough.    Vasomotor rhinitis  -     fluticasone propionate (FLONASE) 50 mcg/actuation nasal spray; 1 spray (50 mcg total) by Each Nare route once daily.    Controlled type 2 diabetes mellitus without complication, without long-term current use of insulin    Atrial fibrillation with normal ventricular rate    Essential hypertension    Fatigue, unspecified type    Frequent urination  -     POCT urine dipstick without microscope  -     Urine culture      Problem List Items Addressed This Visit        Cardiac/Vascular    Essential hypertension    Atrial fibrillation with normal ventricular rate       Endocrine    Controlled type 2 diabetes mellitus without complication, without long-term current use of insulin      Other Visit Diagnoses     Bronchitis, mucopurulent recurrent    -  Primary    Relevant Medications    levoFLOXacin (LEVAQUIN) 500 MG tablet    benzonatate (TESSALON) 200 MG capsule    Other Relevant Orders    X-Ray Chest PA And Lateral (Completed)    Vasomotor rhinitis        Relevant Medications    fluticasone propionate (FLONASE) 50 mcg/actuation nasal spray    Fatigue, unspecified type        Frequent urination        Relevant Orders    POCT urine dipstick without microscope (Completed)    Urine culture      r/o UTI  CXR- due to recurrent bronchitis; + recurrent congestion on right  May need to consider ST eval - if family wishes?  eval to rule out aspiration if recurrent and CXR with pneumonia  Consider inhaler vs/neb  Has f/u next week

## 2019-07-13 LAB — BACTERIA UR CULT: NORMAL

## 2019-07-15 ENCOUNTER — OFFICE VISIT (OUTPATIENT)
Dept: INTERNAL MEDICINE | Facility: CLINIC | Age: 84
End: 2019-07-15
Payer: MEDICARE

## 2019-07-15 VITALS
WEIGHT: 168 LBS | HEIGHT: 66 IN | SYSTOLIC BLOOD PRESSURE: 110 MMHG | HEART RATE: 48 BPM | DIASTOLIC BLOOD PRESSURE: 58 MMHG | RESPIRATION RATE: 20 BRPM | BODY MASS INDEX: 27 KG/M2 | OXYGEN SATURATION: 95 %

## 2019-07-15 DIAGNOSIS — F02.80 LATE ONSET ALZHEIMER'S DISEASE WITHOUT BEHAVIORAL DISTURBANCE: ICD-10-CM

## 2019-07-15 DIAGNOSIS — I25.10 CORONARY ARTERY DISEASE, ANGINA PRESENCE UNSPECIFIED, UNSPECIFIED VESSEL OR LESION TYPE, UNSPECIFIED WHETHER NATIVE OR TRANSPLANTED HEART: ICD-10-CM

## 2019-07-15 DIAGNOSIS — E78.5 HYPERLIPIDEMIA, UNSPECIFIED HYPERLIPIDEMIA TYPE: ICD-10-CM

## 2019-07-15 DIAGNOSIS — I10 ESSENTIAL HYPERTENSION: Primary | ICD-10-CM

## 2019-07-15 DIAGNOSIS — E11.9 CONTROLLED TYPE 2 DIABETES MELLITUS WITHOUT COMPLICATION, WITHOUT LONG-TERM CURRENT USE OF INSULIN: ICD-10-CM

## 2019-07-15 DIAGNOSIS — G30.1 LATE ONSET ALZHEIMER'S DISEASE WITHOUT BEHAVIORAL DISTURBANCE: ICD-10-CM

## 2019-07-15 PROCEDURE — 99999 PR PBB SHADOW E&M-EST. PATIENT-LVL III: CPT | Mod: PBBFAC,,, | Performed by: INTERNAL MEDICINE

## 2019-07-15 PROCEDURE — 99999 PR STA SHADOW: CPT | Mod: PBBFAC,,, | Performed by: INTERNAL MEDICINE

## 2019-07-15 PROCEDURE — 99213 OFFICE O/P EST LOW 20 MIN: CPT | Mod: PBBFAC | Performed by: INTERNAL MEDICINE

## 2019-07-15 PROCEDURE — 99214 OFFICE O/P EST MOD 30 MIN: CPT | Mod: S$PBB | Performed by: INTERNAL MEDICINE

## 2019-07-15 PROCEDURE — 99999 PR PBB SHADOW E&M-EST. PATIENT-LVL III: ICD-10-PCS | Mod: PBBFAC,,, | Performed by: INTERNAL MEDICINE

## 2019-07-15 NOTE — PROGRESS NOTES
Subjective:       Patient ID: Bjorn Gupta is a 89 y.o. male.    Chief Complaint: Follow-up; Hypertension; Hyperlipidemia; and CAD F/U    Bjorn Gupta is a 89 y.o. male who presents for  Hypertension, diabetes and Hyperlipidemia follow up. Labs were reviewed with patient today.    Hyperlipidemia   This is a chronic problem. The current episode started more than 1 year ago. The problem is controlled. Recent lipid tests were reviewed and are low. Exacerbating diseases include obesity. Pertinent negatives include no chest pain, leg pain, myalgias or shortness of breath. Current antihyperlipidemic treatment includes statins. The current treatment provides moderate improvement of lipids. Risk factors for coronary artery disease include male sex, dyslipidemia and hypertension.   Hypertension   This is a chronic problem. The current episode started more than 1 year ago. The problem has been waxing and waning since onset. The problem is controlled. Pertinent negatives include no chest pain, palpitations or shortness of breath. Risk factors for coronary artery disease include sedentary lifestyle, obesity and dyslipidemia. Past treatments include ACE inhibitors, lifestyle changes, beta blockers and central alpha agonists. The current treatment provides moderate improvement. Hypertensive end-organ damage includes CAD/MI.   Diabetes   Pertinent negatives for hypoglycemia include no dizziness, nervousness/anxiousness, pallor, seizures or speech difficulty. Pertinent negatives for diabetes include no chest pain.   Medication Refill   Pertinent negatives include no abdominal pain, chest pain, chills, congestion, fever, myalgias, numbness, sore throat or vomiting.   Coronary Artery Disease   Pertinent negatives include no chest pain, chest tightness, dizziness, palpitations or shortness of breath. Risk factors include hyperlipidemia and obesity.     Review of Systems   Constitutional: Negative for chills and fever.   HENT:  Negative for congestion, postnasal drip and sore throat.    Eyes: Negative for photophobia.   Respiratory: Negative for chest tightness and shortness of breath.    Cardiovascular: Negative for chest pain and palpitations.   Gastrointestinal: Negative for abdominal distention, abdominal pain, blood in stool and vomiting.   Genitourinary: Negative for dysuria, flank pain and hematuria.   Musculoskeletal: Negative for back pain and myalgias.   Skin: Negative for pallor.   Neurological: Negative for dizziness, seizures, facial asymmetry, speech difficulty and numbness.   Hematological: Does not bruise/bleed easily.   Psychiatric/Behavioral: Negative for agitation and suicidal ideas. The patient is not nervous/anxious.        Objective:      Physical Exam   Constitutional: He is oriented to person, place, and time. He appears well-developed and well-nourished.   HENT:   Head: Normocephalic and atraumatic.   Nose: Nose normal.   Mouth/Throat: Oropharynx is clear and moist.   Eyes: Pupils are equal, round, and reactive to light. Conjunctivae and EOM are normal.   Neck: Normal range of motion. Neck supple. No JVD present. No thyromegaly present.   Cardiovascular: Normal rate, regular rhythm, normal heart sounds and intact distal pulses.   Pulses:       Dorsalis pedis pulses are 2+ on the left side.        Posterior tibial pulses are 2+ on the right side, and 2+ on the left side.   Pulmonary/Chest: Effort normal and breath sounds normal.   Abdominal: Soft. Bowel sounds are normal. He exhibits no distension and no mass. There is no tenderness. There is no guarding.   Musculoskeletal: Normal range of motion.   Bilateral  Edema legs   Feet:   Right Foot:   Protective Sensation: 6 sites tested.   Skin Integrity: Negative for ulcer, erythema or dry skin.   Left Foot:   Protective Sensation: 6 sites tested. 6 sites sensed.   Skin Integrity: Negative for ulcer, erythema or dry skin.   Lymphadenopathy:     He has no cervical  adenopathy.   Neurological: He is alert and oriented to person, place, and time. He has normal reflexes. No cranial nerve deficit.   Skin: Skin is warm and dry. No rash noted. No pallor.   Psychiatric: He has a normal mood and affect.   Nursing note and vitals reviewed.      Assessment:       1. Essential hypertension    2. Hyperlipidemia, unspecified hyperlipidemia type    3. Coronary artery disease, angina presence unspecified, unspecified vessel or lesion type, unspecified whether native or transplanted heart    4. Late onset Alzheimer's disease without behavioral disturbance    5. Controlled type 2 diabetes mellitus without complication, without long-term current use of insulin        Plan:   Bjorn was seen today for follow-up, hypertension, hyperlipidemia and cad f/u.    Diagnoses and all orders for this visit:    Essential hypertension  -     CBC auto differential; Future  -     Comprehensive metabolic panel; Future    Well controlled.  Continue same medication and dose.  1. Keep weight close to ideal body weight.   2.   Avoid high salt foods (olives, pickles, smoked meats, salted potato chips, etc.).   Do not add salt to your food at the table.   Use only small amounts of salt when cooking.   3. Begin an exercise program. Discuss with your doctor what type of exercise program would be best for you. It doesn't have to be difficult. Even brisk walking for 20 minutes three times a week is a good form of exercise.   4. Avoid medicines which contain heart stimulants. This includes many cold and sinus decongestant pills and sprays as well as diet pills. Check the warnings about hypertension on the label. Stimulants such as amphetamine or cocaine could be lethal for someone with hypertension. Never take these.    Hyperlipidemia, unspecified hyperlipidemia type  -     Lipid panel; Future  -     TSH; Future  Well controlled.  Continue same medication and dose.  Coronary artery disease, angina presence unspecified,  unspecified vessel or lesion type, unspecified whether native or transplanted heart  -     Lipid panel; Future  -     TSH; Future  Continue meds.      Late onset Alzheimer's disease without behavioral disturbance  -     Ambulatory Referral to Outpatient Case Management  Continue medications        Controlled type 2 diabetes mellitus without complication, without long-term current use of insulin  Well controlled.      Problem List Items Addressed This Visit     Hyperlipidemia    Essential hypertension - Primary    CAD (coronary artery disease)    Late onset Alzheimer's disease without behavioral disturbance    Relevant Orders    Ambulatory Referral to Outpatient Case Management

## 2019-07-18 ENCOUNTER — OUTPATIENT CASE MANAGEMENT (OUTPATIENT)
Dept: ADMINISTRATIVE | Facility: OTHER | Age: 84
End: 2019-07-18

## 2019-07-22 ENCOUNTER — TELEPHONE (OUTPATIENT)
Dept: INTERNAL MEDICINE | Facility: CLINIC | Age: 84
End: 2019-07-22

## 2019-07-22 NOTE — TELEPHONE ENCOUNTER
----- Message from Opal Velasquez sent at 2019  8:44 AM CDT -----  Contact: Leyda/Daughter  Bjorn Gupta  MRN: [id number]  : 1925  PCP: Arjun Lund  Home Phone      [phone]  Work Phone      Not on file.  Mobile          [phone]    MESSAGE:     Would like to speak to nurse to find out if her father should continue to use RX fluticasone propionate (FLONASE) 50 mcg/actuation nasal spray because he had a nose bleed over the weekend.  Would like to discuss this. Please call to advise.    Phone: [phone]

## 2019-07-22 NOTE — TELEPHONE ENCOUNTER
Patient had a nose bleed over the weekend.  Daughter asking if patient should continue flonase?  Please advise.

## 2019-08-08 DIAGNOSIS — R60.0 EDEMA OF BOTH LEGS: ICD-10-CM

## 2019-08-08 RX ORDER — FUROSEMIDE 20 MG/1
TABLET ORAL
Qty: 90 TABLET | Refills: 1 | Status: SHIPPED | OUTPATIENT
Start: 2019-08-08 | End: 2020-01-20

## 2019-09-05 DIAGNOSIS — I10 ESSENTIAL HYPERTENSION: ICD-10-CM

## 2019-09-05 RX ORDER — METOPROLOL SUCCINATE 50 MG/1
TABLET, EXTENDED RELEASE ORAL
Qty: 30 TABLET | Refills: 5 | Status: SHIPPED | OUTPATIENT
Start: 2019-09-05 | End: 2020-03-02

## 2019-09-05 RX ORDER — ROSUVASTATIN CALCIUM 20 MG/1
TABLET, COATED ORAL
Qty: 30 TABLET | Refills: 5 | Status: SHIPPED | OUTPATIENT
Start: 2019-09-05 | End: 2020-01-28 | Stop reason: SDUPTHER

## 2019-10-05 DIAGNOSIS — K92.2 GASTROINTESTINAL HEMORRHAGE, UNSPECIFIED GASTROINTESTINAL HEMORRHAGE TYPE: ICD-10-CM

## 2019-10-07 RX ORDER — FERROUS SULFATE 325(65) MG
TABLET ORAL
Qty: 90 TABLET | Refills: 2 | Status: SHIPPED | OUTPATIENT
Start: 2019-10-07 | End: 2020-01-06

## 2019-10-16 ENCOUNTER — PES CALL (OUTPATIENT)
Dept: ADMINISTRATIVE | Facility: CLINIC | Age: 84
End: 2019-10-16

## 2019-10-24 ENCOUNTER — PATIENT OUTREACH (OUTPATIENT)
Dept: ADMINISTRATIVE | Facility: OTHER | Age: 84
End: 2019-10-24

## 2019-10-29 ENCOUNTER — OFFICE VISIT (OUTPATIENT)
Dept: NEUROLOGY | Facility: CLINIC | Age: 84
End: 2019-10-29
Payer: MEDICARE

## 2019-10-29 VITALS
BODY MASS INDEX: 26.78 KG/M2 | DIASTOLIC BLOOD PRESSURE: 72 MMHG | HEART RATE: 62 BPM | RESPIRATION RATE: 18 BRPM | WEIGHT: 170.63 LBS | SYSTOLIC BLOOD PRESSURE: 118 MMHG | HEIGHT: 67 IN

## 2019-10-29 DIAGNOSIS — R63.4 WEIGHT LOSS: ICD-10-CM

## 2019-10-29 DIAGNOSIS — G62.9 POLYNEUROPATHY: ICD-10-CM

## 2019-10-29 DIAGNOSIS — G47.52 REM BEHAVIORAL DISORDER: ICD-10-CM

## 2019-10-29 DIAGNOSIS — E11.9 CONTROLLED TYPE 2 DIABETES MELLITUS WITHOUT COMPLICATION, WITHOUT LONG-TERM CURRENT USE OF INSULIN: ICD-10-CM

## 2019-10-29 DIAGNOSIS — G30.1 LATE ONSET ALZHEIMER'S DISEASE WITHOUT BEHAVIORAL DISTURBANCE: Primary | ICD-10-CM

## 2019-10-29 DIAGNOSIS — G56.00 CARPAL TUNNEL SYNDROME, UNSPECIFIED LATERALITY: ICD-10-CM

## 2019-10-29 DIAGNOSIS — F02.80 LATE ONSET ALZHEIMER'S DISEASE WITHOUT BEHAVIORAL DISTURBANCE: Primary | ICD-10-CM

## 2019-10-29 PROCEDURE — 99999 PR PBB SHADOW E&M-EST. PATIENT-LVL III: ICD-10-PCS | Mod: PBBFAC,,, | Performed by: PSYCHIATRY & NEUROLOGY

## 2019-10-29 PROCEDURE — 99999 PR PBB SHADOW E&M-EST. PATIENT-LVL III: CPT | Mod: PBBFAC,,, | Performed by: PSYCHIATRY & NEUROLOGY

## 2019-10-29 PROCEDURE — 99213 OFFICE O/P EST LOW 20 MIN: CPT | Mod: PBBFAC | Performed by: PSYCHIATRY & NEUROLOGY

## 2019-10-29 PROCEDURE — 99999 PR STA SHADOW: CPT | Mod: PBBFAC,,, | Performed by: PSYCHIATRY & NEUROLOGY

## 2019-10-29 PROCEDURE — 99214 OFFICE O/P EST MOD 30 MIN: CPT | Mod: S$PBB | Performed by: PSYCHIATRY & NEUROLOGY

## 2019-10-29 NOTE — PROGRESS NOTES
"HPI: Bjorn Gupta is a 89 y.o. male with over one year of numbness in the hands. Some degenerative changes not unexpected for age on C spine xray.2013  EMG showed: Mild Axonal polyneuropathy effecting the hands, possibly from age or from patient's history of "pre-diabetes", Superimposed Bilateral Carpal Tunnel Syndrome which is severe on the right, and Cervical radiculopathy best localizing to C6 bilaterally and C7 on the left       Since the last visit with me, the patient has been seeing NP, Eladia Jackson, in Neurology in this clinic  He is on Zoloft for mood    Memory continues to decline with short term memory. He requires no help with dressing and feeding self but he has to be reminded to start these things.  He has lost 7 pounds since the last, but is eating well    Mood is ok. He gets mildly frustrated in certain situations. He is sleep is good with some dream sleep.   He fell out of the bed once 3 months without injury.   No neck pain  Numbness in hands and feet are not bothersome to him  Family assists him with gait as he could not tolerate prior.     DM2 is stable        ROS   Not reliable due to memory loss    Exam:  Gen Appearance, well developed/nourished in no apparent distress  CV: 2+ distal pulses with no edema or swelling  Neuro:  MS: Awake, alert, oriented to person only. Sustains attention. Recent recall very forgetful/remote memory intact, Language is full to spontaneous speech/comprehension. Fund of Knowledge is full  CN: Optic discs are flat with normal vasculature, PERRL, Extraoccular movements and visual fields are full. Normal facial s strength,  Tongue and Palate are midline and strong. Shoulder Shrug symmetric and strong.  Motor: Normal bulk, tone, no abnormal movements. 5/5 strength bilateral upper/lower extremities with 2+ reflexes   Sensory: symmetric to  temp, and vibration, Romberg mildly positive  Cerebellar: Finger-nose,Heal-shin, Rapid alternating movements intact  Gait: " "Normal stance, no ataxia and uses cane well and family assists him with walking (could not tolerate walker prior)      EMG: Mild Axonal polyneuropathy effecting the hands, possibly from age or from patient's history of "pre-diabetes", Superimposed Bilateral Carpal Tunnel Syndrome which is severe on the right,  Cervical radiculopathy best localizing to C6 bilaterally and C7 on the left     CT head 11/2017: Age-appropriate generalized cerebral volume loss with mild/moderate  degree of patchy decreased attenuation supratentorial white matter suggestive for chronic microvascular ischemic change similarly seen on the prior.     No evidence for acute intracranial hemorrhage or definite new abnormal parenchymal attenuation. Clinical correlation and further evaluation as warranted.    Labs: A1C 6.2 in 6/2019         Assessment/Plan: Bjorn Gupta is a 89 y.o. male with over one year of numbness in the hands. Some degenerative changes not unexpected for age on C spine xray.2013  EMG showed: Mild Axonal polyneuropathy effecting the hands, possibly from age or from patient's history of "pre-diabetes", Superimposed Bilateral Carpal Tunnel Syndrome which is severe on the right, and Cervical radiculopathy best localizing to C6 bilaterally and C7 on the left. Also has dementia  I recommend:       1. Continue Zoloft 25 mg for agitation (Rx taken over by PCP) which is currently well controlled  2. Patient has  numbness in the hands (not bothersome) without  cervical radicular symptoms  3. CTS symptoms did not respond well to brace but patient's symptoms do sound most consistent with CTS, but he declined CTR offered by orthopedist.  4. He is now diagnosed with borderline DM. Continue good DM2 control to prevent worsening neuropathy.   4. Fall precautions reviewed- now walking with a cane. Gait improved with PT, ordered after fall previously   5. Patient's memory continues to worsen over time.  Namenda (Rx taken over by PCP)to " continue and he continues B12 for lower normal levels found. Follow up B12 levels PRN. He lives with family and no longer drives  -Discussed benefits of using aricept is likely not a good as risk given his age.   6. 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 Could try Melatonin 3mg nightly if this persists/worsens unless sedation.   7. Note: He was unable to tolerate anticoagulation for A-fib, given his GI bleed.   8.  Weight loss noted. Increase protein in diet  RTC in 6 months

## 2020-01-06 ENCOUNTER — CLINICAL SUPPORT (OUTPATIENT)
Dept: INTERNAL MEDICINE | Facility: CLINIC | Age: 85
End: 2020-01-06
Payer: MEDICARE

## 2020-01-06 DIAGNOSIS — I25.10 CORONARY ARTERY DISEASE, ANGINA PRESENCE UNSPECIFIED, UNSPECIFIED VESSEL OR LESION TYPE, UNSPECIFIED WHETHER NATIVE OR TRANSPLANTED HEART: ICD-10-CM

## 2020-01-06 DIAGNOSIS — K92.2 GASTROINTESTINAL HEMORRHAGE, UNSPECIFIED GASTROINTESTINAL HEMORRHAGE TYPE: ICD-10-CM

## 2020-01-06 DIAGNOSIS — E78.5 HYPERLIPIDEMIA, UNSPECIFIED HYPERLIPIDEMIA TYPE: ICD-10-CM

## 2020-01-06 DIAGNOSIS — I10 ESSENTIAL HYPERTENSION: ICD-10-CM

## 2020-01-06 LAB
ALBUMIN SERPL BCP-MCNC: 3.4 G/DL (ref 3.5–5.2)
ALP SERPL-CCNC: 172 U/L (ref 55–135)
ALT SERPL W/O P-5'-P-CCNC: 33 U/L (ref 10–44)
ANION GAP SERPL CALC-SCNC: 10 MMOL/L (ref 8–16)
AST SERPL-CCNC: 23 U/L (ref 10–40)
BASOPHILS # BLD AUTO: 0.05 K/UL (ref 0–0.2)
BASOPHILS NFR BLD: 1.1 % (ref 0–1.9)
BILIRUB SERPL-MCNC: 0.7 MG/DL (ref 0.1–1)
BUN SERPL-MCNC: 15 MG/DL (ref 10–30)
CALCIUM SERPL-MCNC: 9.5 MG/DL (ref 8.7–10.5)
CHLORIDE SERPL-SCNC: 102 MMOL/L (ref 95–110)
CHOLEST SERPL-MCNC: 135 MG/DL (ref 120–199)
CHOLEST/HDLC SERPL: 3 {RATIO} (ref 2–5)
CO2 SERPL-SCNC: 27 MMOL/L (ref 23–29)
CREAT SERPL-MCNC: 1 MG/DL (ref 0.5–1.4)
DIFFERENTIAL METHOD: ABNORMAL
EOSINOPHIL # BLD AUTO: 0.2 K/UL (ref 0–0.5)
EOSINOPHIL NFR BLD: 4.5 % (ref 0–8)
ERYTHROCYTE [DISTWIDTH] IN BLOOD BY AUTOMATED COUNT: 15.2 % (ref 11.5–14.5)
EST. GFR  (AFRICAN AMERICAN): >60 ML/MIN/1.73 M^2
EST. GFR  (NON AFRICAN AMERICAN): >60 ML/MIN/1.73 M^2
GLUCOSE SERPL-MCNC: 105 MG/DL (ref 70–110)
HCT VFR BLD AUTO: 38.7 % (ref 40–54)
HDLC SERPL-MCNC: 45 MG/DL (ref 40–75)
HDLC SERPL: 33.3 % (ref 20–50)
HGB BLD-MCNC: 12 G/DL (ref 14–18)
IMM GRANULOCYTES # BLD AUTO: 0.01 K/UL (ref 0–0.04)
IMM GRANULOCYTES NFR BLD AUTO: 0.2 % (ref 0–0.5)
LDLC SERPL CALC-MCNC: 74.8 MG/DL (ref 63–159)
LYMPHOCYTES # BLD AUTO: 1 K/UL (ref 1–4.8)
LYMPHOCYTES NFR BLD: 21.6 % (ref 18–48)
MCH RBC QN AUTO: 29.5 PG (ref 27–31)
MCHC RBC AUTO-ENTMCNC: 31 G/DL (ref 32–36)
MCV RBC AUTO: 95 FL (ref 82–98)
MONOCYTES # BLD AUTO: 0.5 K/UL (ref 0.3–1)
MONOCYTES NFR BLD: 11.9 % (ref 4–15)
NEUTROPHILS # BLD AUTO: 2.7 K/UL (ref 1.8–7.7)
NEUTROPHILS NFR BLD: 60.7 % (ref 38–73)
NONHDLC SERPL-MCNC: 90 MG/DL
NRBC BLD-RTO: 0 /100 WBC
PLATELET # BLD AUTO: 117 K/UL (ref 150–350)
PMV BLD AUTO: 10.3 FL (ref 9.2–12.9)
POTASSIUM SERPL-SCNC: 4.7 MMOL/L (ref 3.5–5.1)
PROT SERPL-MCNC: 6.8 G/DL (ref 6–8.4)
RBC # BLD AUTO: 4.07 M/UL (ref 4.6–6.2)
SODIUM SERPL-SCNC: 139 MMOL/L (ref 136–145)
TRIGL SERPL-MCNC: 76 MG/DL (ref 30–150)
TSH SERPL DL<=0.005 MIU/L-ACNC: 3.31 UIU/ML (ref 0.4–4)
WBC # BLD AUTO: 4.45 K/UL (ref 3.9–12.7)

## 2020-01-06 PROCEDURE — 99999 PR STA SHADOW: ICD-10-PCS | Mod: PBBFAC,,,

## 2020-01-06 PROCEDURE — 36415 COLL VENOUS BLD VENIPUNCTURE: CPT | Mod: PBBFAC

## 2020-01-06 PROCEDURE — 84443 ASSAY THYROID STIM HORMONE: CPT

## 2020-01-06 PROCEDURE — 99999 PR STA SHADOW: CPT | Mod: PBBFAC,,,

## 2020-01-06 PROCEDURE — 80053 COMPREHEN METABOLIC PANEL: CPT

## 2020-01-06 PROCEDURE — 85025 COMPLETE CBC W/AUTO DIFF WBC: CPT

## 2020-01-06 PROCEDURE — 80061 LIPID PANEL: CPT

## 2020-01-06 RX ORDER — FERROUS SULFATE 325(65) MG
TABLET ORAL
Qty: 90 TABLET | Refills: 1 | Status: SHIPPED | OUTPATIENT
Start: 2020-01-06 | End: 2020-03-09

## 2020-01-13 ENCOUNTER — OFFICE VISIT (OUTPATIENT)
Dept: INTERNAL MEDICINE | Facility: CLINIC | Age: 85
End: 2020-01-13
Payer: MEDICARE

## 2020-01-13 VITALS
BODY MASS INDEX: 26.61 KG/M2 | SYSTOLIC BLOOD PRESSURE: 108 MMHG | DIASTOLIC BLOOD PRESSURE: 70 MMHG | OXYGEN SATURATION: 96 % | RESPIRATION RATE: 16 BRPM | HEART RATE: 52 BPM | WEIGHT: 169.56 LBS | HEIGHT: 67 IN

## 2020-01-13 DIAGNOSIS — D69.6 THROMBOCYTOPENIA: ICD-10-CM

## 2020-01-13 DIAGNOSIS — I71.40 ABDOMINAL AORTIC ANEURYSM (AAA) WITHOUT RUPTURE: ICD-10-CM

## 2020-01-13 DIAGNOSIS — E11.9 CONTROLLED TYPE 2 DIABETES MELLITUS WITHOUT COMPLICATION, WITHOUT LONG-TERM CURRENT USE OF INSULIN: Primary | ICD-10-CM

## 2020-01-13 DIAGNOSIS — I48.91 ATRIAL FIBRILLATION WITH NORMAL VENTRICULAR RATE: ICD-10-CM

## 2020-01-13 DIAGNOSIS — I10 ESSENTIAL HYPERTENSION: ICD-10-CM

## 2020-01-13 PROCEDURE — 99999 PR PBB SHADOW E&M-EST. PATIENT-LVL III: CPT | Mod: PBBFAC,,, | Performed by: INTERNAL MEDICINE

## 2020-01-13 PROCEDURE — 99214 OFFICE O/P EST MOD 30 MIN: CPT | Mod: S$PBB | Performed by: INTERNAL MEDICINE

## 2020-01-13 PROCEDURE — 99999 PR PBB SHADOW E&M-EST. PATIENT-LVL III: ICD-10-PCS | Mod: PBBFAC,,, | Performed by: INTERNAL MEDICINE

## 2020-01-13 PROCEDURE — 99213 OFFICE O/P EST LOW 20 MIN: CPT | Mod: PBBFAC | Performed by: INTERNAL MEDICINE

## 2020-01-13 PROCEDURE — 99999 PR STA SHADOW: CPT | Mod: PBBFAC,,, | Performed by: INTERNAL MEDICINE

## 2020-01-13 NOTE — PROGRESS NOTES
Subjective:       Patient ID: Bjorn Gupta is a 89 y.o. male.    Chief Complaint: Follow-up; Hypertension; Hyperlipidemia; and Diabetes    Bjorn Gupta is a 89 y.o. male who presents for  Hypertension, diabetes and Hyperlipidemia follow up. Labs were reviewed with patient today.    Hyperlipidemia   This is a chronic problem. The current episode started more than 1 year ago. The problem is controlled. Recent lipid tests were reviewed and are low. Exacerbating diseases include obesity. Pertinent negatives include no chest pain, leg pain, myalgias or shortness of breath. Current antihyperlipidemic treatment includes statins. The current treatment provides moderate improvement of lipids. Risk factors for coronary artery disease include male sex, dyslipidemia and hypertension.   Hypertension   This is a chronic problem. The current episode started more than 1 year ago. The problem has been waxing and waning since onset. The problem is controlled. Pertinent negatives include no chest pain, palpitations or shortness of breath. Risk factors for coronary artery disease include sedentary lifestyle, obesity and dyslipidemia. Past treatments include ACE inhibitors, lifestyle changes, beta blockers and central alpha agonists. The current treatment provides moderate improvement. Hypertensive end-organ damage includes CAD/MI.   Diabetes   Pertinent negatives for hypoglycemia include no dizziness, nervousness/anxiousness, pallor, seizures or speech difficulty. Pertinent negatives for diabetes include no chest pain.   Medication Refill   Pertinent negatives include no abdominal pain, chest pain, chills, congestion, fever, myalgias, numbness, sore throat or vomiting.   Coronary Artery Disease   Pertinent negatives include no chest pain, chest tightness, dizziness, palpitations or shortness of breath. Risk factors include hyperlipidemia and obesity.     Review of Systems   Constitutional: Negative for chills and fever.   HENT:  Negative for congestion, postnasal drip and sore throat.    Eyes: Negative for photophobia.   Respiratory: Negative for chest tightness and shortness of breath.    Cardiovascular: Negative for chest pain and palpitations.   Gastrointestinal: Negative for abdominal distention, abdominal pain, blood in stool and vomiting.   Genitourinary: Negative for dysuria, flank pain and hematuria.   Musculoskeletal: Negative for back pain and myalgias.   Skin: Negative for pallor.   Neurological: Negative for dizziness, seizures, facial asymmetry, speech difficulty and numbness.   Hematological: Does not bruise/bleed easily.   Psychiatric/Behavioral: Negative for agitation and suicidal ideas. The patient is not nervous/anxious.        Objective:      Physical Exam   Constitutional: He is oriented to person, place, and time. He appears well-developed and well-nourished.   HENT:   Head: Normocephalic and atraumatic.   Nose: Nose normal.   Mouth/Throat: Oropharynx is clear and moist.   Eyes: Pupils are equal, round, and reactive to light. Conjunctivae and EOM are normal.   Neck: Normal range of motion. Neck supple. No JVD present. No thyromegaly present.   Cardiovascular: Normal rate, regular rhythm, normal heart sounds and intact distal pulses.   Pulses:       Dorsalis pedis pulses are 2+ on the left side.        Posterior tibial pulses are 2+ on the right side, and 2+ on the left side.   Pulmonary/Chest: Effort normal and breath sounds normal.   Abdominal: Soft. Bowel sounds are normal. He exhibits no distension and no mass. There is no tenderness. There is no guarding.   Musculoskeletal: Normal range of motion.   Bilateral  Edema legs   Feet:   Right Foot:   Protective Sensation: 6 sites tested.   Skin Integrity: Negative for ulcer, erythema or dry skin.   Left Foot:   Protective Sensation: 6 sites tested. 6 sites sensed.   Skin Integrity: Negative for ulcer, erythema or dry skin.   Lymphadenopathy:     He has no cervical  adenopathy.   Neurological: He is alert and oriented to person, place, and time. He has normal reflexes. No cranial nerve deficit.   Skin: Skin is warm and dry. No rash noted. No pallor.   Psychiatric: He has a normal mood and affect.   Nursing note and vitals reviewed.      Assessment:       1. Controlled type 2 diabetes mellitus without complication, without long-term current use of insulin    2. Essential hypertension    3. Abdominal aortic aneurysm (AAA) without rupture    4. Atrial fibrillation with normal ventricular rate    5. Thrombocytopenia        Plan:   Bjorn was seen today for follow-up, hypertension, hyperlipidemia and diabetes.    Diagnoses and all orders for this visit:    Controlled type 2 diabetes mellitus without complication, without long-term current use of insulin  -     Hemoglobin A1c; Future  -     Microalbumin/creatinine urine ratio; Future  Patient has controlled Diabetes .  We discussed about diet ;low in calories. Avoid sweats, sodas.  Also increasing activity;walking 2-3 miles a day.  I also adjusted medications and gave patient  instructions about adherence to plan.  Goal of  A1c  less than 7 % stressed.  Also goal of LDL less than 70 highlighted to patient.  Essential hypertension  -     CBC auto differential; Future  -     Comprehensive metabolic panel; Future    Well controlled.  Continue same medication and dose.  1. Keep weight close to ideal body weight.   2.   Avoid high salt foods (olives, pickles, smoked meats, salted potato chips, etc.).   Do not add salt to your food at the table.   Use only small amounts of salt when cooking.   3. Begin an exercise program. Discuss with your doctor what type of exercise program would be best for you. It doesn't have to be difficult. Even brisk walking for 20 minutes three times a week is a good form of exercise.   4. Avoid medicines which contain heart stimulants. This includes many cold and sinus decongestant pills and sprays as well as  diet pills. Check the warnings about hypertension on the label. Stimulants such as amphetamine or cocaine could be lethal for someone with hypertension. Never take these.    Abdominal aortic aneurysm (AAA) without rupture  -     Lipid panel; Future  Stable    Atrial fibrillation with normal ventricular rate  -     TSH; Future  Continue metoprolol    Thrombocytopenia  -     CBC auto differential; Future    Well controlled.  Continue same medication and dose.  Problem List Items Addressed This Visit     AAA (abdominal aortic aneurysm)    Relevant Orders    Lipid panel    Thrombocytopenia    Relevant Orders    CBC auto differential    Essential hypertension    Relevant Orders    CBC auto differential    Comprehensive metabolic panel    Atrial fibrillation with normal ventricular rate    Relevant Orders    TSH    Controlled type 2 diabetes mellitus without complication, without long-term current use of insulin - Primary    Relevant Orders    Hemoglobin A1c    Microalbumin/creatinine urine ratio

## 2020-01-17 DIAGNOSIS — R60.0 EDEMA OF BOTH LEGS: ICD-10-CM

## 2020-01-20 RX ORDER — FUROSEMIDE 20 MG/1
TABLET ORAL
Qty: 30 TABLET | Refills: 3 | Status: SHIPPED | OUTPATIENT
Start: 2020-01-20 | End: 2020-05-04

## 2020-01-20 RX ORDER — MEMANTINE HYDROCHLORIDE 5 MG/1
TABLET ORAL
Qty: 60 TABLET | Refills: 4 | Status: SHIPPED | OUTPATIENT
Start: 2020-01-20 | End: 2020-05-05 | Stop reason: SDUPTHER

## 2020-01-28 RX ORDER — ROSUVASTATIN CALCIUM 20 MG/1
20 TABLET, COATED ORAL DAILY
Qty: 90 TABLET | Refills: 1 | Status: SHIPPED | OUTPATIENT
Start: 2020-01-28 | End: 2020-09-03

## 2020-01-28 NOTE — TELEPHONE ENCOUNTER
Requested Prescriptions     Pending Prescriptions Disp Refills    rosuvastatin (CRESTOR) 20 MG tablet 90 tablet 1     Sig: Take 1 tablet (20 mg total) by mouth once daily.   [facility]

## 2020-02-29 DIAGNOSIS — I10 ESSENTIAL HYPERTENSION: ICD-10-CM

## 2020-02-29 DIAGNOSIS — G30.9 ALZHEIMER'S DEMENTIA WITH BEHAVIORAL DISTURBANCE, UNSPECIFIED TIMING OF DEMENTIA ONSET: ICD-10-CM

## 2020-02-29 DIAGNOSIS — F02.81 ALZHEIMER'S DEMENTIA WITH BEHAVIORAL DISTURBANCE, UNSPECIFIED TIMING OF DEMENTIA ONSET: ICD-10-CM

## 2020-03-02 RX ORDER — SERTRALINE HYDROCHLORIDE 25 MG/1
TABLET, FILM COATED ORAL
Qty: 90 TABLET | Refills: 0 | Status: SHIPPED | OUTPATIENT
Start: 2020-03-02 | End: 2020-05-22

## 2020-03-02 RX ORDER — METOPROLOL SUCCINATE 50 MG/1
TABLET, EXTENDED RELEASE ORAL
Qty: 90 TABLET | Refills: 0 | Status: SHIPPED | OUTPATIENT
Start: 2020-03-02 | End: 2020-06-08

## 2020-03-03 ENCOUNTER — TELEPHONE (OUTPATIENT)
Dept: INTERNAL MEDICINE | Facility: CLINIC | Age: 85
End: 2020-03-03

## 2020-03-03 DIAGNOSIS — E11.9 CONTROLLED TYPE 2 DIABETES MELLITUS WITHOUT COMPLICATION, WITHOUT LONG-TERM CURRENT USE OF INSULIN: ICD-10-CM

## 2020-03-03 DIAGNOSIS — N30.00 ACUTE CYSTITIS WITHOUT HEMATURIA: Primary | ICD-10-CM

## 2020-03-03 DIAGNOSIS — I10 ESSENTIAL HYPERTENSION: ICD-10-CM

## 2020-03-03 DIAGNOSIS — K92.2 GASTROINTESTINAL HEMORRHAGE, UNSPECIFIED GASTROINTESTINAL HEMORRHAGE TYPE: ICD-10-CM

## 2020-03-03 NOTE — TELEPHONE ENCOUNTER
Patient has lab work scheduled for his 6 mo follow up, but daughter states that you wanted patient to do blood work in 3 mo.    Please advise.

## 2020-03-03 NOTE — TELEPHONE ENCOUNTER
----- Message from Gisselle Van sent at 3/3/2020 10:06 AM CST -----  Contact: Daughter- Leyda Gupta  MRN: [id number]  : 1925  PCP: Arjun Lund  Home Phone      [phone]  Work Phone      Not on file.  Mobile          [phone]      MESSAGE:   Daughter states at last appt Dr. Lund told patient to have BW done in 3 months but nothing was scheduled, she would like to know if he wants blood work done or not.   Last appt was  and if needs BW this should be done soon.    Phone: [phone]

## 2020-03-08 DIAGNOSIS — K92.2 GASTROINTESTINAL HEMORRHAGE, UNSPECIFIED GASTROINTESTINAL HEMORRHAGE TYPE: ICD-10-CM

## 2020-03-09 RX ORDER — FERROUS SULFATE 325(65) MG
TABLET ORAL
Qty: 90 TABLET | Refills: 0 | Status: SHIPPED | OUTPATIENT
Start: 2020-03-09 | End: 2020-04-06

## 2020-04-06 DIAGNOSIS — K92.2 GASTROINTESTINAL HEMORRHAGE, UNSPECIFIED GASTROINTESTINAL HEMORRHAGE TYPE: ICD-10-CM

## 2020-04-06 RX ORDER — FERROUS SULFATE 325(65) MG
TABLET ORAL
Qty: 90 TABLET | Refills: 0 | Status: SHIPPED | OUTPATIENT
Start: 2020-04-06 | End: 2020-05-04

## 2020-05-04 DIAGNOSIS — K92.2 GASTROINTESTINAL HEMORRHAGE, UNSPECIFIED GASTROINTESTINAL HEMORRHAGE TYPE: ICD-10-CM

## 2020-05-04 DIAGNOSIS — R60.0 EDEMA OF BOTH LEGS: ICD-10-CM

## 2020-05-04 RX ORDER — FUROSEMIDE 20 MG/1
TABLET ORAL
Qty: 90 TABLET | Refills: 0 | Status: SHIPPED | OUTPATIENT
Start: 2020-05-04 | End: 2020-09-03

## 2020-05-04 RX ORDER — FERROUS SULFATE 325(65) MG
TABLET ORAL
Qty: 90 TABLET | Refills: 0 | Status: SHIPPED | OUTPATIENT
Start: 2020-05-04 | End: 2020-06-08

## 2020-05-05 DIAGNOSIS — G30.1 LATE ONSET ALZHEIMER'S DISEASE WITHOUT BEHAVIORAL DISTURBANCE: Primary | ICD-10-CM

## 2020-05-05 DIAGNOSIS — F02.80 LATE ONSET ALZHEIMER'S DISEASE WITHOUT BEHAVIORAL DISTURBANCE: Primary | ICD-10-CM

## 2020-05-05 RX ORDER — MEMANTINE HYDROCHLORIDE 5 MG/1
5 TABLET ORAL 2 TIMES DAILY
Qty: 180 TABLET | Refills: 1 | Status: SHIPPED | OUTPATIENT
Start: 2020-05-05 | End: 2020-11-02

## 2020-05-05 NOTE — TELEPHONE ENCOUNTER
Requested Prescriptions     Pending Prescriptions Disp Refills    memantine (NAMENDA) 5 MG Tab 180 tablet 1     Sig: Take 1 tablet (5 mg total) by mouth 2 (two) times daily.   LOV: 1/13/20. [facility]

## 2020-05-21 DIAGNOSIS — F02.81 ALZHEIMER'S DEMENTIA WITH BEHAVIORAL DISTURBANCE, UNSPECIFIED TIMING OF DEMENTIA ONSET: ICD-10-CM

## 2020-05-21 DIAGNOSIS — G30.9 ALZHEIMER'S DEMENTIA WITH BEHAVIORAL DISTURBANCE, UNSPECIFIED TIMING OF DEMENTIA ONSET: ICD-10-CM

## 2020-05-22 RX ORDER — SERTRALINE HYDROCHLORIDE 25 MG/1
TABLET, FILM COATED ORAL
Qty: 90 TABLET | Refills: 0 | Status: SHIPPED | OUTPATIENT
Start: 2020-05-22 | End: 2020-09-03

## 2020-07-06 ENCOUNTER — CLINICAL SUPPORT (OUTPATIENT)
Dept: INTERNAL MEDICINE | Facility: CLINIC | Age: 85
End: 2020-07-06
Payer: MEDICARE

## 2020-07-06 DIAGNOSIS — E11.9 CONTROLLED TYPE 2 DIABETES MELLITUS WITHOUT COMPLICATION, WITHOUT LONG-TERM CURRENT USE OF INSULIN: ICD-10-CM

## 2020-07-06 DIAGNOSIS — I71.40 ABDOMINAL AORTIC ANEURYSM (AAA) WITHOUT RUPTURE: ICD-10-CM

## 2020-07-06 DIAGNOSIS — I10 ESSENTIAL HYPERTENSION: ICD-10-CM

## 2020-07-06 DIAGNOSIS — I48.91 ATRIAL FIBRILLATION WITH NORMAL VENTRICULAR RATE: ICD-10-CM

## 2020-07-06 DIAGNOSIS — D69.6 THROMBOCYTOPENIA: ICD-10-CM

## 2020-07-06 LAB
ALBUMIN SERPL BCP-MCNC: 3.3 G/DL (ref 3.5–5.2)
ALBUMIN/CREAT UR: 82 UG/MG (ref 0–30)
ALP SERPL-CCNC: 181 U/L (ref 55–135)
ALT SERPL W/O P-5'-P-CCNC: 22 U/L (ref 10–44)
ANION GAP SERPL CALC-SCNC: 10 MMOL/L (ref 8–16)
AST SERPL-CCNC: 17 U/L (ref 10–40)
BASOPHILS # BLD AUTO: 0.04 K/UL (ref 0–0.2)
BASOPHILS NFR BLD: 0.9 % (ref 0–1.9)
BILIRUB SERPL-MCNC: 0.9 MG/DL (ref 0.1–1)
BUN SERPL-MCNC: 13 MG/DL (ref 10–30)
CALCIUM SERPL-MCNC: 9.1 MG/DL (ref 8.7–10.5)
CHLORIDE SERPL-SCNC: 104 MMOL/L (ref 95–110)
CHOLEST SERPL-MCNC: 121 MG/DL (ref 120–199)
CHOLEST/HDLC SERPL: 3.1 {RATIO} (ref 2–5)
CO2 SERPL-SCNC: 26 MMOL/L (ref 23–29)
CREAT SERPL-MCNC: 0.9 MG/DL (ref 0.5–1.4)
CREAT UR-MCNC: 87.8 MG/DL (ref 23–375)
DIFFERENTIAL METHOD: ABNORMAL
EOSINOPHIL # BLD AUTO: 0.2 K/UL (ref 0–0.5)
EOSINOPHIL NFR BLD: 4.5 % (ref 0–8)
ERYTHROCYTE [DISTWIDTH] IN BLOOD BY AUTOMATED COUNT: 14.9 % (ref 11.5–14.5)
EST. GFR  (AFRICAN AMERICAN): >60 ML/MIN/1.73 M^2
EST. GFR  (NON AFRICAN AMERICAN): >60 ML/MIN/1.73 M^2
ESTIMATED AVG GLUCOSE: 117 MG/DL (ref 68–131)
GLUCOSE SERPL-MCNC: 94 MG/DL (ref 70–110)
HBA1C MFR BLD HPLC: 5.7 % (ref 4–5.6)
HCT VFR BLD AUTO: 38.3 % (ref 40–54)
HDLC SERPL-MCNC: 39 MG/DL (ref 40–75)
HDLC SERPL: 32.2 % (ref 20–50)
HGB BLD-MCNC: 12.5 G/DL (ref 14–18)
IMM GRANULOCYTES # BLD AUTO: 0.01 K/UL (ref 0–0.04)
IMM GRANULOCYTES NFR BLD AUTO: 0.2 % (ref 0–0.5)
LDLC SERPL CALC-MCNC: 63.2 MG/DL (ref 63–159)
LYMPHOCYTES # BLD AUTO: 1 K/UL (ref 1–4.8)
LYMPHOCYTES NFR BLD: 24.5 % (ref 18–48)
MCH RBC QN AUTO: 30.9 PG (ref 27–31)
MCHC RBC AUTO-ENTMCNC: 32.6 G/DL (ref 32–36)
MCV RBC AUTO: 95 FL (ref 82–98)
MICROALBUMIN UR DL<=1MG/L-MCNC: 72 UG/ML
MONOCYTES # BLD AUTO: 0.5 K/UL (ref 0.3–1)
MONOCYTES NFR BLD: 10.8 % (ref 4–15)
NEUTROPHILS # BLD AUTO: 2.5 K/UL (ref 1.8–7.7)
NEUTROPHILS NFR BLD: 59.1 % (ref 38–73)
NONHDLC SERPL-MCNC: 82 MG/DL
NRBC BLD-RTO: 0 /100 WBC
PLATELET # BLD AUTO: 107 K/UL (ref 150–350)
PMV BLD AUTO: 10.5 FL (ref 9.2–12.9)
POTASSIUM SERPL-SCNC: 4 MMOL/L (ref 3.5–5.1)
PROT SERPL-MCNC: 6.6 G/DL (ref 6–8.4)
RBC # BLD AUTO: 4.04 M/UL (ref 4.6–6.2)
SODIUM SERPL-SCNC: 140 MMOL/L (ref 136–145)
TRIGL SERPL-MCNC: 94 MG/DL (ref 30–150)
TSH SERPL DL<=0.005 MIU/L-ACNC: 3.03 UIU/ML (ref 0.4–4)
WBC # BLD AUTO: 4.24 K/UL (ref 3.9–12.7)

## 2020-07-06 PROCEDURE — 83036 HEMOGLOBIN GLYCOSYLATED A1C: CPT

## 2020-07-06 PROCEDURE — 80053 COMPREHEN METABOLIC PANEL: CPT

## 2020-07-06 PROCEDURE — 84443 ASSAY THYROID STIM HORMONE: CPT

## 2020-07-06 PROCEDURE — 36415 COLL VENOUS BLD VENIPUNCTURE: CPT | Mod: PBBFAC

## 2020-07-06 PROCEDURE — 85025 COMPLETE CBC W/AUTO DIFF WBC: CPT

## 2020-07-06 PROCEDURE — 80061 LIPID PANEL: CPT

## 2020-07-06 PROCEDURE — 99999 PR STA SHADOW: ICD-10-PCS | Mod: PBBFAC,,,

## 2020-07-06 PROCEDURE — 82043 UR ALBUMIN QUANTITATIVE: CPT

## 2020-07-06 PROCEDURE — 99999 PR STA SHADOW: CPT | Mod: PBBFAC,,,

## 2020-07-06 NOTE — PROGRESS NOTES
Venipuncture Collected.     Number of Sticks: 1  Site #1: Right Antecubital  Site #2: N/A  Site #3: N/A    Complications? None  Additional Comments:

## 2020-07-13 ENCOUNTER — OFFICE VISIT (OUTPATIENT)
Dept: INTERNAL MEDICINE | Facility: CLINIC | Age: 85
End: 2020-07-13
Payer: MEDICARE

## 2020-07-13 VITALS
RESPIRATION RATE: 19 BRPM | OXYGEN SATURATION: 95 % | WEIGHT: 171.06 LBS | BODY MASS INDEX: 26.85 KG/M2 | DIASTOLIC BLOOD PRESSURE: 86 MMHG | HEIGHT: 67 IN | SYSTOLIC BLOOD PRESSURE: 125 MMHG | TEMPERATURE: 98 F | HEART RATE: 47 BPM

## 2020-07-13 DIAGNOSIS — E11.9 CONTROLLED TYPE 2 DIABETES MELLITUS WITHOUT COMPLICATION, WITHOUT LONG-TERM CURRENT USE OF INSULIN: ICD-10-CM

## 2020-07-13 DIAGNOSIS — I10 ESSENTIAL HYPERTENSION: ICD-10-CM

## 2020-07-13 DIAGNOSIS — G30.1 LATE ONSET ALZHEIMER'S DISEASE WITHOUT BEHAVIORAL DISTURBANCE: ICD-10-CM

## 2020-07-13 DIAGNOSIS — E78.5 HYPERLIPIDEMIA, UNSPECIFIED HYPERLIPIDEMIA TYPE: Primary | ICD-10-CM

## 2020-07-13 DIAGNOSIS — F02.80 LATE ONSET ALZHEIMER'S DISEASE WITHOUT BEHAVIORAL DISTURBANCE: ICD-10-CM

## 2020-07-13 DIAGNOSIS — I48.91 ATRIAL FIBRILLATION WITH NORMAL VENTRICULAR RATE: ICD-10-CM

## 2020-07-13 PROCEDURE — 99999 PR STA SHADOW: CPT | Mod: PBBFAC,,, | Performed by: INTERNAL MEDICINE

## 2020-07-13 PROCEDURE — 99999 PR PBB SHADOW E&M-EST. PATIENT-LVL IV: ICD-10-PCS | Mod: PBBFAC,,, | Performed by: INTERNAL MEDICINE

## 2020-07-13 PROCEDURE — 99214 OFFICE O/P EST MOD 30 MIN: CPT | Mod: S$PBB | Performed by: INTERNAL MEDICINE

## 2020-07-13 PROCEDURE — 99999 PR PBB SHADOW E&M-EST. PATIENT-LVL IV: CPT | Mod: PBBFAC,,, | Performed by: INTERNAL MEDICINE

## 2020-07-13 PROCEDURE — 99214 OFFICE O/P EST MOD 30 MIN: CPT | Mod: PBBFAC | Performed by: INTERNAL MEDICINE

## 2020-07-13 NOTE — PROGRESS NOTES
Subjective:       Patient ID: Bjorn Gupta is a 89 y.o. male.    Chief Complaint: Follow-up (6 mo), Hypertension, and Diabetes    Bjorn Gupta is a 89 y.o. male who presents for  Hypertension, diabetes and Hyperlipidemia follow up. Labs were reviewed with patient today.  alzheimer's is slowly getting worse ;  Sleep about 17 hrs a day ; confused ; vivid dreams.      Hyperlipidemia  This is a chronic problem. The current episode started more than 1 year ago. The problem is controlled. Recent lipid tests were reviewed and are low. Exacerbating diseases include obesity. Pertinent negatives include no chest pain, leg pain, myalgias or shortness of breath. Current antihyperlipidemic treatment includes statins. The current treatment provides moderate improvement of lipids. Risk factors for coronary artery disease include male sex, dyslipidemia and hypertension.   Hypertension  This is a chronic problem. The current episode started more than 1 year ago. The problem has been waxing and waning since onset. The problem is controlled. Pertinent negatives include no chest pain, palpitations or shortness of breath. Risk factors for coronary artery disease include sedentary lifestyle, obesity and dyslipidemia. Past treatments include ACE inhibitors, lifestyle changes, beta blockers and central alpha agonists. The current treatment provides moderate improvement. Hypertensive end-organ damage includes CAD/MI.   Diabetes  Pertinent negatives for hypoglycemia include no dizziness, nervousness/anxiousness, pallor, seizures or speech difficulty. Pertinent negatives for diabetes include no chest pain.   Medication Refill  Pertinent negatives include no abdominal pain, chest pain, chills, congestion, fever, myalgias, numbness, sore throat or vomiting.   Coronary Artery Disease  Pertinent negatives include no chest pain, chest tightness, dizziness, palpitations or shortness of breath. Risk factors include hyperlipidemia and obesity.      Review of Systems   Constitutional: Negative for chills and fever.   HENT: Negative for congestion, postnasal drip and sore throat.    Eyes: Negative for photophobia.   Respiratory: Negative for chest tightness and shortness of breath.    Cardiovascular: Negative for chest pain and palpitations.   Gastrointestinal: Negative for abdominal distention, abdominal pain, blood in stool and vomiting.   Genitourinary: Negative for dysuria, flank pain and hematuria.   Musculoskeletal: Negative for back pain and myalgias.   Skin: Negative for pallor.   Neurological: Negative for dizziness, seizures, facial asymmetry, speech difficulty and numbness.   Hematological: Does not bruise/bleed easily.   Psychiatric/Behavioral: Negative for agitation and suicidal ideas. The patient is not nervous/anxious.        Objective:      Physical Exam  Vitals signs and nursing note reviewed.   Constitutional:       Appearance: He is well-developed.   HENT:      Head: Normocephalic and atraumatic.      Nose: Nose normal.   Eyes:      Conjunctiva/sclera: Conjunctivae normal.      Pupils: Pupils are equal, round, and reactive to light.   Neck:      Musculoskeletal: Normal range of motion and neck supple.      Thyroid: No thyromegaly.      Vascular: No JVD.   Cardiovascular:      Rate and Rhythm: Normal rate and regular rhythm.      Pulses:           Dorsalis pedis pulses are 2+ on the left side.        Posterior tibial pulses are 2+ on the right side and 2+ on the left side.      Heart sounds: Normal heart sounds.   Pulmonary:      Effort: Pulmonary effort is normal.      Breath sounds: Normal breath sounds.   Abdominal:      General: Bowel sounds are normal. There is no distension.      Palpations: Abdomen is soft. There is no mass.      Tenderness: There is no abdominal tenderness. There is no guarding.   Musculoskeletal: Normal range of motion.      Comments: Bilateral  Edema legs   Feet:      Right foot:      Protective Sensation: 6 sites  tested.      Skin integrity: No ulcer, erythema or dry skin.      Left foot:      Protective Sensation: 6 sites tested. 6 sites sensed.      Skin integrity: No ulcer, erythema or dry skin.   Lymphadenopathy:      Cervical: No cervical adenopathy.   Skin:     General: Skin is warm and dry.      Coloration: Skin is not pale.      Findings: No rash.   Neurological:      Mental Status: He is alert and oriented to person, place, and time.      Cranial Nerves: No cranial nerve deficit.      Deep Tendon Reflexes: Reflexes are normal and symmetric.         Assessment:       1. Hyperlipidemia, unspecified hyperlipidemia type    2. Essential hypertension    3. Controlled type 2 diabetes mellitus without complication, without long-term current use of insulin    4. Atrial fibrillation with normal ventricular rate    5. Late onset Alzheimer's disease without behavioral disturbance        Plan:   Bjorn was seen today for follow-up, hypertension and diabetes.    Diagnoses and all orders for this visit:    Hyperlipidemia, unspecified hyperlipidemia type  -     Lipid Panel; Future  Well controlled.  Continue same medication and dose.  Essential hypertension  -     CBC auto differential; Future  -     Comprehensive metabolic panel; Future  -     Lipid Panel; Future    Well controlled.  Continue same medication and dose.  1. Keep weight close to ideal body weight.   2.   Avoid high salt foods (olives, pickles, smoked meats, salted potato chips, etc.).   Do not add salt to your food at the table.   Use only small amounts of salt when cooking.   3. Begin an exercise program. Discuss with your doctor what type of exercise program would be best for you. It doesn't have to be difficult. Even brisk walking for 20 minutes three times a week is a good form of exercise.   4. Avoid medicines which contain heart stimulants. This includes many cold and sinus decongestant pills and sprays as well as diet pills. Check the warnings about  hypertension on the label. Stimulants such as amphetamine or cocaine could be lethal for someone with hypertension. Never take these.    Controlled type 2 diabetes mellitus without complication, without long-term current use of insulin  -     Lipid Panel; Future  -     Hemoglobin A1C; Future  -     Microalbumin/creatinine urine ratio; Future  Lab Results   Component Value Date    HGBA1C 5.7 (H) 07/06/2020     Well controlled.  Continue same medication and dose.  Atrial fibrillation with normal ventricular rate  -     TSH; Future  Well controlled.  Continue same medication and dose.  Late onset Alzheimer's disease without behavioral disturbance  -     TSH; Future  -     Urinalysis; Future  Slowly getting worse.      Problem List Items Addressed This Visit     Hyperlipidemia - Primary    Relevant Orders    Lipid Panel    Essential hypertension    Relevant Orders    CBC auto differential    Comprehensive metabolic panel    Lipid Panel    Atrial fibrillation with normal ventricular rate    Relevant Orders    TSH    Controlled type 2 diabetes mellitus without complication, without long-term current use of insulin    Relevant Orders    Lipid Panel    Hemoglobin A1C    Microalbumin/creatinine urine ratio    Late onset Alzheimer's disease without behavioral disturbance    Relevant Orders    TSH    Urinalysis

## 2020-10-16 ENCOUNTER — TELEPHONE (OUTPATIENT)
Dept: INTERNAL MEDICINE | Facility: CLINIC | Age: 85
End: 2020-10-16

## 2020-10-16 NOTE — TELEPHONE ENCOUNTER
----- Message from Rosi Worrell sent at 10/16/2020 12:16 PM CDT -----  Regarding: Request to speak to a nurse  Contact: Leyda (daughter)  Bjorn Gupta  MRN: [id number]  : 1925  PCP: Arjun Lund  Home Phone      [phone]  Work Phone      Not on file.  Mobile          [phone]      MESSAGE:    Request to speak to a nurse regarding medical questions about flu shot / appointment scheduled on 10/19/20.    Phone # [phone]    Pharmacy - [facility] [address] [place] [phone]

## 2020-10-16 NOTE — TELEPHONE ENCOUNTER
Daughter wanted to know if he should still get the flu shot even though he is not really going anywhere. I told her I would still recommend it because other people are still coming to his house and someone else can still expose him.

## 2020-10-19 ENCOUNTER — IMMUNIZATION (OUTPATIENT)
Dept: INTERNAL MEDICINE | Facility: CLINIC | Age: 85
End: 2020-10-19
Payer: MEDICARE

## 2020-10-19 PROCEDURE — G0008 ADMIN INFLUENZA VIRUS VAC: HCPCS | Mod: PBBFAC

## 2020-10-19 PROCEDURE — 90694 VACC AIIV4 NO PRSRV 0.5ML IM: CPT | Mod: PBBFAC

## 2021-01-08 DIAGNOSIS — K92.2 GASTROINTESTINAL HEMORRHAGE, UNSPECIFIED GASTROINTESTINAL HEMORRHAGE TYPE: ICD-10-CM

## 2021-01-08 RX ORDER — FERROUS SULFATE 325(65) MG
TABLET ORAL
Qty: 90 TABLET | Refills: 0 | Status: SHIPPED | OUTPATIENT
Start: 2021-01-08

## 2021-01-08 RX ORDER — FERROUS SULFATE 325(65) MG
TABLET ORAL
Qty: 90 TABLET | Refills: 0 | Status: SHIPPED | OUTPATIENT
Start: 2021-01-08 | End: 2021-01-08 | Stop reason: SDUPTHER

## 2021-01-11 ENCOUNTER — CLINICAL SUPPORT (OUTPATIENT)
Dept: INTERNAL MEDICINE | Facility: CLINIC | Age: 86
End: 2021-01-11
Payer: MEDICARE

## 2021-01-11 DIAGNOSIS — I48.91 ATRIAL FIBRILLATION WITH NORMAL VENTRICULAR RATE: ICD-10-CM

## 2021-01-11 DIAGNOSIS — F02.80 LATE ONSET ALZHEIMER'S DISEASE WITHOUT BEHAVIORAL DISTURBANCE: ICD-10-CM

## 2021-01-11 DIAGNOSIS — E78.5 HYPERLIPIDEMIA, UNSPECIFIED HYPERLIPIDEMIA TYPE: ICD-10-CM

## 2021-01-11 DIAGNOSIS — E11.9 CONTROLLED TYPE 2 DIABETES MELLITUS WITHOUT COMPLICATION, WITHOUT LONG-TERM CURRENT USE OF INSULIN: ICD-10-CM

## 2021-01-11 DIAGNOSIS — I10 ESSENTIAL HYPERTENSION: ICD-10-CM

## 2021-01-11 DIAGNOSIS — G30.1 LATE ONSET ALZHEIMER'S DISEASE WITHOUT BEHAVIORAL DISTURBANCE: ICD-10-CM

## 2021-01-11 LAB
ALBUMIN SERPL BCP-MCNC: 3.3 G/DL (ref 3.5–5.2)
ALBUMIN/CREAT UR: 104.8 UG/MG (ref 0–30)
ALP SERPL-CCNC: 192 U/L (ref 55–135)
ALT SERPL W/O P-5'-P-CCNC: 19 U/L (ref 10–44)
ANION GAP SERPL CALC-SCNC: 10 MMOL/L (ref 8–16)
AST SERPL-CCNC: 21 U/L (ref 10–40)
BASOPHILS # BLD AUTO: 0.04 K/UL (ref 0–0.2)
BASOPHILS NFR BLD: 0.9 % (ref 0–1.9)
BILIRUB SERPL-MCNC: 0.9 MG/DL (ref 0.1–1)
BILIRUB UR QL STRIP: NEGATIVE
BUN SERPL-MCNC: 18 MG/DL (ref 10–30)
CALCIUM SERPL-MCNC: 9.3 MG/DL (ref 8.7–10.5)
CHLORIDE SERPL-SCNC: 105 MMOL/L (ref 95–110)
CHOLEST SERPL-MCNC: 136 MG/DL (ref 120–199)
CHOLEST/HDLC SERPL: 3.2 {RATIO} (ref 2–5)
CLARITY UR: CLEAR
CO2 SERPL-SCNC: 26 MMOL/L (ref 23–29)
COLOR UR: YELLOW
CREAT SERPL-MCNC: 1 MG/DL (ref 0.5–1.4)
CREAT UR-MCNC: 81.1 MG/DL (ref 23–375)
DIFFERENTIAL METHOD: ABNORMAL
EOSINOPHIL # BLD AUTO: 0.2 K/UL (ref 0–0.5)
EOSINOPHIL NFR BLD: 4 % (ref 0–8)
ERYTHROCYTE [DISTWIDTH] IN BLOOD BY AUTOMATED COUNT: 14.7 % (ref 11.5–14.5)
EST. GFR  (AFRICAN AMERICAN): >60 ML/MIN/1.73 M^2
EST. GFR  (NON AFRICAN AMERICAN): >60 ML/MIN/1.73 M^2
ESTIMATED AVG GLUCOSE: 114 MG/DL (ref 68–131)
GLUCOSE SERPL-MCNC: 97 MG/DL (ref 70–110)
GLUCOSE UR QL STRIP: NEGATIVE
HBA1C MFR BLD HPLC: 5.6 % (ref 4–5.6)
HCT VFR BLD AUTO: 39.1 % (ref 40–54)
HDLC SERPL-MCNC: 43 MG/DL (ref 40–75)
HDLC SERPL: 31.6 % (ref 20–50)
HGB BLD-MCNC: 12.5 G/DL (ref 14–18)
HGB UR QL STRIP: NEGATIVE
IMM GRANULOCYTES # BLD AUTO: 0.02 K/UL (ref 0–0.04)
IMM GRANULOCYTES NFR BLD AUTO: 0.5 % (ref 0–0.5)
KETONES UR QL STRIP: NEGATIVE
LDLC SERPL CALC-MCNC: 77.4 MG/DL (ref 63–159)
LEUKOCYTE ESTERASE UR QL STRIP: NEGATIVE
LYMPHOCYTES # BLD AUTO: 0.9 K/UL (ref 1–4.8)
LYMPHOCYTES NFR BLD: 20 % (ref 18–48)
MCH RBC QN AUTO: 30.9 PG (ref 27–31)
MCHC RBC AUTO-ENTMCNC: 32 G/DL (ref 32–36)
MCV RBC AUTO: 97 FL (ref 82–98)
MICROALBUMIN UR DL<=1MG/L-MCNC: 85 UG/ML
MONOCYTES # BLD AUTO: 0.6 K/UL (ref 0.3–1)
MONOCYTES NFR BLD: 13 % (ref 4–15)
NEUTROPHILS # BLD AUTO: 2.6 K/UL (ref 1.8–7.7)
NEUTROPHILS NFR BLD: 61.6 % (ref 38–73)
NITRITE UR QL STRIP: NEGATIVE
NONHDLC SERPL-MCNC: 93 MG/DL
NRBC BLD-RTO: 0 /100 WBC
PH UR STRIP: 6 [PH] (ref 5–8)
PLATELET # BLD AUTO: 114 K/UL (ref 150–350)
PMV BLD AUTO: 10.6 FL (ref 9.2–12.9)
POTASSIUM SERPL-SCNC: 5 MMOL/L (ref 3.5–5.1)
PROT SERPL-MCNC: 6.6 G/DL (ref 6–8.4)
PROT UR QL STRIP: ABNORMAL
RBC # BLD AUTO: 4.05 M/UL (ref 4.6–6.2)
SODIUM SERPL-SCNC: 141 MMOL/L (ref 136–145)
SP GR UR STRIP: 1.02 (ref 1–1.03)
TRIGL SERPL-MCNC: 78 MG/DL (ref 30–150)
TSH SERPL DL<=0.005 MIU/L-ACNC: 2.98 UIU/ML (ref 0.4–4)
URN SPEC COLLECT METH UR: ABNORMAL
UROBILINOGEN UR STRIP-ACNC: 1 EU/DL
WBC # BLD AUTO: 4.24 K/UL (ref 3.9–12.7)

## 2021-01-11 PROCEDURE — 82043 UR ALBUMIN QUANTITATIVE: CPT

## 2021-01-11 PROCEDURE — 84443 ASSAY THYROID STIM HORMONE: CPT

## 2021-01-11 PROCEDURE — 99999 PR STA SHADOW: CPT | Mod: PBBFAC,,,

## 2021-01-11 PROCEDURE — 81003 URINALYSIS AUTO W/O SCOPE: CPT

## 2021-01-11 PROCEDURE — 80053 COMPREHEN METABOLIC PANEL: CPT

## 2021-01-11 PROCEDURE — 99999 PR STA SHADOW: ICD-10-PCS | Mod: PBBFAC,,,

## 2021-01-11 PROCEDURE — 83036 HEMOGLOBIN GLYCOSYLATED A1C: CPT

## 2021-01-11 PROCEDURE — 85025 COMPLETE CBC W/AUTO DIFF WBC: CPT

## 2021-01-11 PROCEDURE — 80061 LIPID PANEL: CPT

## 2021-01-11 PROCEDURE — 82570 ASSAY OF URINE CREATININE: CPT

## 2021-01-11 PROCEDURE — 36415 COLL VENOUS BLD VENIPUNCTURE: CPT | Mod: PBBFAC

## 2021-01-13 ENCOUNTER — OFFICE VISIT (OUTPATIENT)
Dept: INTERNAL MEDICINE | Facility: CLINIC | Age: 86
End: 2021-01-13
Payer: MEDICARE

## 2021-01-13 VITALS
WEIGHT: 165.38 LBS | RESPIRATION RATE: 16 BRPM | BODY MASS INDEX: 25.96 KG/M2 | SYSTOLIC BLOOD PRESSURE: 98 MMHG | DIASTOLIC BLOOD PRESSURE: 60 MMHG | HEART RATE: 46 BPM | HEIGHT: 67 IN | OXYGEN SATURATION: 94 %

## 2021-01-13 DIAGNOSIS — F02.80 LATE ONSET ALZHEIMER'S DISEASE WITHOUT BEHAVIORAL DISTURBANCE: ICD-10-CM

## 2021-01-13 DIAGNOSIS — G30.1 LATE ONSET ALZHEIMER'S DISEASE WITHOUT BEHAVIORAL DISTURBANCE: ICD-10-CM

## 2021-01-13 DIAGNOSIS — E11.9 CONTROLLED TYPE 2 DIABETES MELLITUS WITHOUT COMPLICATION, WITHOUT LONG-TERM CURRENT USE OF INSULIN: ICD-10-CM

## 2021-01-13 DIAGNOSIS — R29.6 RECURRENT FALLS: Primary | ICD-10-CM

## 2021-01-13 DIAGNOSIS — E78.5 HYPERLIPIDEMIA, UNSPECIFIED HYPERLIPIDEMIA TYPE: ICD-10-CM

## 2021-01-13 DIAGNOSIS — I10 ESSENTIAL HYPERTENSION: ICD-10-CM

## 2021-01-13 DIAGNOSIS — I25.10 CORONARY ARTERY DISEASE, ANGINA PRESENCE UNSPECIFIED, UNSPECIFIED VESSEL OR LESION TYPE, UNSPECIFIED WHETHER NATIVE OR TRANSPLANTED HEART: ICD-10-CM

## 2021-01-13 DIAGNOSIS — D69.6 THROMBOCYTOPENIA: ICD-10-CM

## 2021-01-13 PROCEDURE — 99214 OFFICE O/P EST MOD 30 MIN: CPT | Mod: S$PBB | Performed by: INTERNAL MEDICINE

## 2021-01-13 PROCEDURE — 99999 PR STA SHADOW: CPT | Mod: PBBFAC,,, | Performed by: INTERNAL MEDICINE

## 2021-01-13 PROCEDURE — 99999 PR PBB SHADOW E&M-EST. PATIENT-LVL IV: CPT | Mod: PBBFAC,,, | Performed by: INTERNAL MEDICINE

## 2021-01-13 PROCEDURE — 99999 PR PBB SHADOW E&M-EST. PATIENT-LVL IV: ICD-10-PCS | Mod: PBBFAC,,, | Performed by: INTERNAL MEDICINE

## 2021-01-13 PROCEDURE — 99214 OFFICE O/P EST MOD 30 MIN: CPT | Mod: PBBFAC | Performed by: INTERNAL MEDICINE

## 2021-02-02 ENCOUNTER — TELEPHONE (OUTPATIENT)
Dept: INTERNAL MEDICINE | Facility: CLINIC | Age: 86
End: 2021-02-02

## 2021-04-12 ENCOUNTER — TELEPHONE (OUTPATIENT)
Dept: INTERNAL MEDICINE | Facility: CLINIC | Age: 86
End: 2021-04-12

## 2021-04-26 ENCOUNTER — HOSPITAL ENCOUNTER (OUTPATIENT)
Dept: RADIOLOGY | Facility: HOSPITAL | Age: 86
Discharge: HOME | End: 2021-04-26
Attending: INTERNAL MEDICINE
Payer: MEDICARE

## 2021-04-26 ENCOUNTER — OFFICE VISIT (OUTPATIENT)
Dept: INTERNAL MEDICINE | Facility: CLINIC | Age: 86
End: 2021-04-26
Payer: MEDICARE

## 2021-04-26 ENCOUNTER — TELEPHONE (OUTPATIENT)
Dept: INTERNAL MEDICINE | Facility: CLINIC | Age: 86
End: 2021-04-26

## 2021-04-26 VITALS
DIASTOLIC BLOOD PRESSURE: 60 MMHG | RESPIRATION RATE: 16 BRPM | SYSTOLIC BLOOD PRESSURE: 152 MMHG | HEART RATE: 48 BPM | HEIGHT: 67 IN | WEIGHT: 158.5 LBS | OXYGEN SATURATION: 97 % | BODY MASS INDEX: 24.88 KG/M2

## 2021-04-26 DIAGNOSIS — R60.0 LOCALIZED EDEMA: ICD-10-CM

## 2021-04-26 DIAGNOSIS — M79.89 SWELLING OF RIGHT HAND: Primary | ICD-10-CM

## 2021-04-26 DIAGNOSIS — M79.89 SWELLING OF RIGHT HAND: ICD-10-CM

## 2021-04-26 PROCEDURE — 99999 PR STA SHADOW: CPT | Mod: PBBFAC,,, | Performed by: INTERNAL MEDICINE

## 2021-04-26 PROCEDURE — 99999 PR STA SHADOW: ICD-10-PCS | Mod: PBBFAC,,, | Performed by: INTERNAL MEDICINE

## 2021-04-26 PROCEDURE — 99213 OFFICE O/P EST LOW 20 MIN: CPT | Mod: S$PBB | Performed by: INTERNAL MEDICINE

## 2021-04-26 PROCEDURE — 93971 EXTREMITY STUDY: CPT | Mod: TC,RT

## 2021-04-26 PROCEDURE — 99999 PR PBB SHADOW E&M-EST. PATIENT-LVL IV: CPT | Mod: PBBFAC,,, | Performed by: INTERNAL MEDICINE

## 2021-04-26 PROCEDURE — 99214 OFFICE O/P EST MOD 30 MIN: CPT | Mod: PBBFAC,25 | Performed by: INTERNAL MEDICINE

## 2021-05-07 ENCOUNTER — HOSPITAL ENCOUNTER (OUTPATIENT)
Dept: TELEMEDICINE | Facility: HOSPITAL | Age: 86
Discharge: HOME | End: 2021-05-07
Payer: MEDICARE

## 2021-05-07 ENCOUNTER — HOSPITAL ENCOUNTER (INPATIENT)
Facility: HOSPITAL | Age: 86
LOS: 5 days | Discharge: NURSING, REHAB OR LONG-TERM CARE | DRG: 064 | End: 2021-05-12
Attending: EMERGENCY MEDICINE | Admitting: PSYCHIATRY & NEUROLOGY
Payer: MEDICARE

## 2021-05-07 DIAGNOSIS — I63.412 EMBOLIC STROKE INVOLVING LEFT MIDDLE CEREBRAL ARTERY: ICD-10-CM

## 2021-05-07 DIAGNOSIS — I50.20 HFREF (HEART FAILURE WITH REDUCED EJECTION FRACTION): Primary | ICD-10-CM

## 2021-05-07 DIAGNOSIS — I63.512 ACUTE ISCHEMIC LEFT MCA STROKE: ICD-10-CM

## 2021-05-07 DIAGNOSIS — F02.80 LATE ONSET ALZHEIMER'S DISEASE WITHOUT BEHAVIORAL DISTURBANCE: ICD-10-CM

## 2021-05-07 DIAGNOSIS — G30.1 LATE ONSET ALZHEIMER'S DISEASE WITHOUT BEHAVIORAL DISTURBANCE: ICD-10-CM

## 2021-05-07 DIAGNOSIS — I10 ESSENTIAL HYPERTENSION: ICD-10-CM

## 2021-05-07 DIAGNOSIS — I63.9 STROKE: ICD-10-CM

## 2021-05-07 LAB
ABO + RH BLD: NORMAL
ALBUMIN SERPL BCP-MCNC: 3.2 G/DL (ref 3.5–5.2)
ALP SERPL-CCNC: 168 U/L (ref 55–135)
ALT SERPL W/O P-5'-P-CCNC: 15 U/L (ref 10–44)
ANION GAP SERPL CALC-SCNC: 9 MMOL/L (ref 8–16)
AST SERPL-CCNC: 18 U/L (ref 10–40)
BASOPHILS # BLD AUTO: 0.03 K/UL (ref 0–0.2)
BASOPHILS NFR BLD: 0.6 % (ref 0–1.9)
BILIRUB SERPL-MCNC: 0.6 MG/DL (ref 0.1–1)
BLD GP AB SCN CELLS X3 SERPL QL: NORMAL
BUN SERPL-MCNC: 15 MG/DL (ref 10–30)
BUN SERPL-MCNC: 20 MG/DL (ref 6–30)
CALCIUM SERPL-MCNC: 8.7 MG/DL (ref 8.7–10.5)
CHLORIDE SERPL-SCNC: 101 MMOL/L (ref 95–110)
CHLORIDE SERPL-SCNC: 99 MMOL/L (ref 95–110)
CHOLEST SERPL-MCNC: 133 MG/DL (ref 120–199)
CHOLEST/HDLC SERPL: 3.1 {RATIO} (ref 2–5)
CO2 SERPL-SCNC: 23 MMOL/L (ref 23–29)
CREAT SERPL-MCNC: 0.9 MG/DL (ref 0.5–1.4)
CTP QC/QA: YES
DIFFERENTIAL METHOD: ABNORMAL
EOSINOPHIL # BLD AUTO: 0.2 K/UL (ref 0–0.5)
EOSINOPHIL NFR BLD: 3 % (ref 0–8)
ERYTHROCYTE [DISTWIDTH] IN BLOOD BY AUTOMATED COUNT: 13.6 % (ref 11.5–14.5)
EST. GFR  (AFRICAN AMERICAN): >60 ML/MIN/1.73 M^2
EST. GFR  (NON AFRICAN AMERICAN): >60 ML/MIN/1.73 M^2
GLUCOSE SERPL-MCNC: 103 MG/DL (ref 70–110)
GLUCOSE SERPL-MCNC: 104 MG/DL (ref 70–110)
GLUCOSE SERPL-MCNC: 105 MG/DL (ref 70–110)
HCT VFR BLD AUTO: 36 % (ref 40–54)
HCT VFR BLD CALC: 34 %PCV (ref 36–54)
HDLC SERPL-MCNC: 43 MG/DL (ref 40–75)
HDLC SERPL: 32.3 % (ref 20–50)
HGB BLD-MCNC: 11.6 G/DL (ref 14–18)
IMM GRANULOCYTES # BLD AUTO: 0.02 K/UL (ref 0–0.04)
IMM GRANULOCYTES NFR BLD AUTO: 0.4 % (ref 0–0.5)
INR PPP: 1.2 (ref 0.8–1.2)
LDLC SERPL CALC-MCNC: 65.8 MG/DL (ref 63–159)
LYMPHOCYTES # BLD AUTO: 0.9 K/UL (ref 1–4.8)
LYMPHOCYTES NFR BLD: 18.3 % (ref 18–48)
MCH RBC QN AUTO: 31 PG (ref 27–31)
MCHC RBC AUTO-ENTMCNC: 32.2 G/DL (ref 32–36)
MCV RBC AUTO: 96 FL (ref 82–98)
MONOCYTES # BLD AUTO: 0.5 K/UL (ref 0.3–1)
MONOCYTES NFR BLD: 10.8 % (ref 4–15)
NEUTROPHILS # BLD AUTO: 3.3 K/UL (ref 1.8–7.7)
NEUTROPHILS NFR BLD: 66.9 % (ref 38–73)
NONHDLC SERPL-MCNC: 90 MG/DL
NRBC BLD-RTO: 0 /100 WBC
PLATELET # BLD AUTO: 108 K/UL (ref 150–450)
PMV BLD AUTO: 10.1 FL (ref 9.2–12.9)
POC IONIZED CALCIUM: 1.08 MMOL/L (ref 1.06–1.42)
POC PTINR: 1 (ref 0.9–1.2)
POC PTWBT: 12.4 SEC (ref 9.7–14.3)
POC TCO2 (MEASURED): 28 MMOL/L (ref 23–29)
POCT GLUCOSE: 104 MG/DL (ref 70–110)
POCT GLUCOSE: 105 MG/DL (ref 70–110)
POCT GLUCOSE: 125 MG/DL (ref 70–110)
POTASSIUM BLD-SCNC: 4.9 MMOL/L (ref 3.5–5.1)
POTASSIUM SERPL-SCNC: 4.5 MMOL/L (ref 3.5–5.1)
PROT SERPL-MCNC: 6.3 G/DL (ref 6–8.4)
PROTHROMBIN TIME: 13.3 SEC (ref 9–12.5)
RBC # BLD AUTO: 3.74 M/UL (ref 4.6–6.2)
SAMPLE: ABNORMAL
SAMPLE: NORMAL
SAMPLE: NORMAL
SARS-COV-2 RDRP RESP QL NAA+PROBE: NEGATIVE
SODIUM BLD-SCNC: 133 MMOL/L (ref 136–145)
SODIUM SERPL-SCNC: 133 MMOL/L (ref 136–145)
TRIGL SERPL-MCNC: 121 MG/DL (ref 30–150)
TSH SERPL DL<=0.005 MIU/L-ACNC: 2.5 UIU/ML (ref 0.4–4)
WBC # BLD AUTO: 4.98 K/UL (ref 3.9–12.7)

## 2021-05-07 PROCEDURE — 85610 PROTHROMBIN TIME: CPT

## 2021-05-07 PROCEDURE — 80053 COMPREHEN METABOLIC PANEL: CPT | Performed by: EMERGENCY MEDICINE

## 2021-05-07 PROCEDURE — U0002 COVID-19 LAB TEST NON-CDC: HCPCS | Performed by: STUDENT IN AN ORGANIZED HEALTH CARE EDUCATION/TRAINING PROGRAM

## 2021-05-07 PROCEDURE — 99291 CRITICAL CARE FIRST HOUR: CPT | Mod: GC,,, | Performed by: PSYCHIATRY & NEUROLOGY

## 2021-05-07 PROCEDURE — 20000000 HC ICU ROOM

## 2021-05-07 PROCEDURE — 99291 CRITICAL CARE FIRST HOUR: CPT | Mod: CS,,, | Performed by: EMERGENCY MEDICINE

## 2021-05-07 PROCEDURE — 84443 ASSAY THYROID STIM HORMONE: CPT | Performed by: EMERGENCY MEDICINE

## 2021-05-07 PROCEDURE — 80047 BASIC METABLC PNL IONIZED CA: CPT

## 2021-05-07 PROCEDURE — 25000003 PHARM REV CODE 250: Performed by: NURSE PRACTITIONER

## 2021-05-07 PROCEDURE — 85025 COMPLETE CBC W/AUTO DIFF WBC: CPT | Performed by: EMERGENCY MEDICINE

## 2021-05-07 PROCEDURE — 86900 BLOOD TYPING SEROLOGIC ABO: CPT | Performed by: STUDENT IN AN ORGANIZED HEALTH CARE EDUCATION/TRAINING PROGRAM

## 2021-05-07 PROCEDURE — 93010 EKG 12-LEAD: ICD-10-PCS | Mod: ,,, | Performed by: INTERNAL MEDICINE

## 2021-05-07 PROCEDURE — G0425 INPT/ED TELECONSULT30: HCPCS | Mod: 95,G0,, | Performed by: PSYCHIATRY & NEUROLOGY

## 2021-05-07 PROCEDURE — 99291 PR CRITICAL CARE, E/M 30-74 MINUTES: ICD-10-PCS | Mod: CS,,, | Performed by: EMERGENCY MEDICINE

## 2021-05-07 PROCEDURE — 99291 PR CRITICAL CARE, E/M 30-74 MINUTES: ICD-10-PCS | Mod: GC,,, | Performed by: PSYCHIATRY & NEUROLOGY

## 2021-05-07 PROCEDURE — 82962 GLUCOSE BLOOD TEST: CPT | Mod: 59

## 2021-05-07 PROCEDURE — 93005 ELECTROCARDIOGRAM TRACING: CPT

## 2021-05-07 PROCEDURE — 82565 ASSAY OF CREATININE: CPT

## 2021-05-07 PROCEDURE — 99900035 HC TECH TIME PER 15 MIN (STAT)

## 2021-05-07 PROCEDURE — 80061 LIPID PANEL: CPT | Performed by: EMERGENCY MEDICINE

## 2021-05-07 PROCEDURE — 85610 PROTHROMBIN TIME: CPT | Performed by: EMERGENCY MEDICINE

## 2021-05-07 PROCEDURE — 99223 PR INITIAL HOSPITAL CARE,LEVL III: ICD-10-PCS | Mod: GC,,, | Performed by: PSYCHIATRY & NEUROLOGY

## 2021-05-07 PROCEDURE — 93010 ELECTROCARDIOGRAM REPORT: CPT | Mod: ,,, | Performed by: INTERNAL MEDICINE

## 2021-05-07 PROCEDURE — G0425 PR INPT TELEHEALTH CONSULT 30M: ICD-10-PCS | Mod: 95,G0,, | Performed by: PSYCHIATRY & NEUROLOGY

## 2021-05-07 PROCEDURE — 99285 EMERGENCY DEPT VISIT HI MDM: CPT | Mod: 25

## 2021-05-07 PROCEDURE — 99223 1ST HOSP IP/OBS HIGH 75: CPT | Mod: GC,,, | Performed by: PSYCHIATRY & NEUROLOGY

## 2021-05-07 RX ORDER — FUROSEMIDE 20 MG/1
20 TABLET ORAL DAILY
Status: DISCONTINUED | OUTPATIENT
Start: 2021-05-07 | End: 2021-05-12 | Stop reason: HOSPADM

## 2021-05-07 RX ORDER — GLYCOPYRROLATE 0.2 MG/ML
0.2 INJECTION INTRAMUSCULAR; INTRAVENOUS ONCE
Status: COMPLETED | OUTPATIENT
Start: 2021-05-07 | End: 2021-05-07

## 2021-05-07 RX ORDER — MEMANTINE HYDROCHLORIDE 5 MG/1
5 TABLET ORAL 2 TIMES DAILY
Status: DISCONTINUED | OUTPATIENT
Start: 2021-05-07 | End: 2021-05-12 | Stop reason: HOSPADM

## 2021-05-07 RX ORDER — ROSUVASTATIN CALCIUM 10 MG/1
20 TABLET, COATED ORAL DAILY
Status: DISCONTINUED | OUTPATIENT
Start: 2021-05-07 | End: 2021-05-12 | Stop reason: HOSPADM

## 2021-05-07 RX ORDER — POTASSIUM CHLORIDE 7.45 MG/ML
80 INJECTION INTRAVENOUS
Status: DISCONTINUED | OUTPATIENT
Start: 2021-05-07 | End: 2021-05-09

## 2021-05-07 RX ORDER — POTASSIUM CHLORIDE 7.45 MG/ML
60 INJECTION INTRAVENOUS
Status: DISCONTINUED | OUTPATIENT
Start: 2021-05-07 | End: 2021-05-09

## 2021-05-07 RX ORDER — METOPROLOL SUCCINATE 25 MG/1
50 TABLET, EXTENDED RELEASE ORAL DAILY
Status: DISCONTINUED | OUTPATIENT
Start: 2021-05-07 | End: 2021-05-08

## 2021-05-07 RX ORDER — POTASSIUM CHLORIDE 7.45 MG/ML
40 INJECTION INTRAVENOUS
Status: DISCONTINUED | OUTPATIENT
Start: 2021-05-07 | End: 2021-05-09

## 2021-05-07 RX ORDER — MAGNESIUM SULFATE HEPTAHYDRATE 40 MG/ML
4 INJECTION, SOLUTION INTRAVENOUS
Status: DISCONTINUED | OUTPATIENT
Start: 2021-05-07 | End: 2021-05-09

## 2021-05-07 RX ORDER — MAGNESIUM SULFATE HEPTAHYDRATE 40 MG/ML
2 INJECTION, SOLUTION INTRAVENOUS
Status: DISCONTINUED | OUTPATIENT
Start: 2021-05-07 | End: 2021-05-09

## 2021-05-07 RX ORDER — AMOXICILLIN 250 MG
1 CAPSULE ORAL 2 TIMES DAILY
Status: DISCONTINUED | OUTPATIENT
Start: 2021-05-07 | End: 2021-05-12 | Stop reason: HOSPADM

## 2021-05-07 RX ORDER — ATROPINE SULFATE 0.1 MG/ML
INJECTION INTRAVENOUS
Status: DISPENSED
Start: 2021-05-07 | End: 2021-05-08

## 2021-05-07 RX ORDER — SERTRALINE HYDROCHLORIDE 25 MG/1
25 TABLET, FILM COATED ORAL DAILY
Status: DISCONTINUED | OUTPATIENT
Start: 2021-05-07 | End: 2021-05-12 | Stop reason: HOSPADM

## 2021-05-07 RX ORDER — PANTOPRAZOLE SODIUM 40 MG/1
40 TABLET, DELAYED RELEASE ORAL DAILY
Refills: 1 | Status: DISCONTINUED | OUTPATIENT
Start: 2021-05-07 | End: 2021-05-12 | Stop reason: HOSPADM

## 2021-05-07 RX ORDER — SODIUM CHLORIDE 0.9 % (FLUSH) 0.9 %
10 SYRINGE (ML) INJECTION
Status: DISCONTINUED | OUTPATIENT
Start: 2021-05-07 | End: 2021-05-12 | Stop reason: HOSPADM

## 2021-05-07 RX ORDER — POLYETHYLENE GLYCOL 3350 17 G/17G
17 POWDER, FOR SOLUTION ORAL DAILY
Status: DISCONTINUED | OUTPATIENT
Start: 2021-05-07 | End: 2021-05-12 | Stop reason: HOSPADM

## 2021-05-07 RX ADMIN — GLYCOPYRROLATE 0.2 MG: 0.2 INJECTION, SOLUTION INTRAMUSCULAR; INTRAVITREAL at 09:05

## 2021-05-08 PROBLEM — I63.412 EMBOLIC STROKE INVOLVING LEFT MIDDLE CEREBRAL ARTERY: Status: ACTIVE | Noted: 2021-05-07

## 2021-05-08 PROBLEM — Z51.5 PALLIATIVE CARE ENCOUNTER: Status: ACTIVE | Noted: 2021-05-08

## 2021-05-08 LAB
ALBUMIN SERPL BCP-MCNC: 3.1 G/DL (ref 3.5–5.2)
ALP SERPL-CCNC: 157 U/L (ref 55–135)
ALT SERPL W/O P-5'-P-CCNC: 15 U/L (ref 10–44)
ANION GAP SERPL CALC-SCNC: 12 MMOL/L (ref 8–16)
AST SERPL-CCNC: 17 U/L (ref 10–40)
BASOPHILS # BLD AUTO: 0.03 K/UL (ref 0–0.2)
BASOPHILS NFR BLD: 0.6 % (ref 0–1.9)
BILIRUB SERPL-MCNC: 0.9 MG/DL (ref 0.1–1)
BUN SERPL-MCNC: 16 MG/DL (ref 10–30)
CALCIUM SERPL-MCNC: 8.9 MG/DL (ref 8.7–10.5)
CHLORIDE SERPL-SCNC: 102 MMOL/L (ref 95–110)
CO2 SERPL-SCNC: 20 MMOL/L (ref 23–29)
CREAT SERPL-MCNC: 0.8 MG/DL (ref 0.5–1.4)
DIFFERENTIAL METHOD: ABNORMAL
EOSINOPHIL # BLD AUTO: 0.1 K/UL (ref 0–0.5)
EOSINOPHIL NFR BLD: 1.4 % (ref 0–8)
ERYTHROCYTE [DISTWIDTH] IN BLOOD BY AUTOMATED COUNT: 13.6 % (ref 11.5–14.5)
EST. GFR  (AFRICAN AMERICAN): >60 ML/MIN/1.73 M^2
EST. GFR  (NON AFRICAN AMERICAN): >60 ML/MIN/1.73 M^2
ESTIMATED AVG GLUCOSE: 111 MG/DL (ref 68–131)
GLUCOSE SERPL-MCNC: 102 MG/DL (ref 70–110)
HBA1C MFR BLD: 5.5 % (ref 4–5.6)
HCT VFR BLD AUTO: 34.1 % (ref 40–54)
HGB BLD-MCNC: 11.3 G/DL (ref 14–18)
IMM GRANULOCYTES # BLD AUTO: 0.02 K/UL (ref 0–0.04)
IMM GRANULOCYTES NFR BLD AUTO: 0.4 % (ref 0–0.5)
LYMPHOCYTES # BLD AUTO: 0.8 K/UL (ref 1–4.8)
LYMPHOCYTES NFR BLD: 15.6 % (ref 18–48)
MAGNESIUM SERPL-MCNC: 2 MG/DL (ref 1.6–2.6)
MCH RBC QN AUTO: 31.1 PG (ref 27–31)
MCHC RBC AUTO-ENTMCNC: 33.1 G/DL (ref 32–36)
MCV RBC AUTO: 94 FL (ref 82–98)
MONOCYTES # BLD AUTO: 0.6 K/UL (ref 0.3–1)
MONOCYTES NFR BLD: 11.8 % (ref 4–15)
NEUTROPHILS # BLD AUTO: 3.6 K/UL (ref 1.8–7.7)
NEUTROPHILS NFR BLD: 70.2 % (ref 38–73)
NRBC BLD-RTO: 0 /100 WBC
PHOSPHATE SERPL-MCNC: 3.8 MG/DL (ref 2.7–4.5)
PLATELET # BLD AUTO: 101 K/UL (ref 150–450)
PMV BLD AUTO: 10.4 FL (ref 9.2–12.9)
POCT GLUCOSE: 120 MG/DL (ref 70–110)
POCT GLUCOSE: 135 MG/DL (ref 70–110)
POCT GLUCOSE: 155 MG/DL (ref 70–110)
POCT GLUCOSE: 98 MG/DL (ref 70–110)
POTASSIUM SERPL-SCNC: 4.4 MMOL/L (ref 3.5–5.1)
PROT SERPL-MCNC: 6.2 G/DL (ref 6–8.4)
RBC # BLD AUTO: 3.63 M/UL (ref 4.6–6.2)
SODIUM SERPL-SCNC: 134 MMOL/L (ref 136–145)
WBC # BLD AUTO: 5.07 K/UL (ref 3.9–12.7)

## 2021-05-08 PROCEDURE — 11000001 HC ACUTE MED/SURG PRIVATE ROOM

## 2021-05-08 PROCEDURE — 85025 COMPLETE CBC W/AUTO DIFF WBC: CPT | Performed by: STUDENT IN AN ORGANIZED HEALTH CARE EDUCATION/TRAINING PROGRAM

## 2021-05-08 PROCEDURE — 99223 PR INITIAL HOSPITAL CARE,LEVL III: ICD-10-PCS | Mod: ,,, | Performed by: INTERNAL MEDICINE

## 2021-05-08 PROCEDURE — 99233 PR SUBSEQUENT HOSPITAL CARE,LEVL III: ICD-10-PCS | Mod: ,,, | Performed by: PSYCHIATRY & NEUROLOGY

## 2021-05-08 PROCEDURE — 84100 ASSAY OF PHOSPHORUS: CPT | Performed by: STUDENT IN AN ORGANIZED HEALTH CARE EDUCATION/TRAINING PROGRAM

## 2021-05-08 PROCEDURE — 92610 EVALUATE SWALLOWING FUNCTION: CPT

## 2021-05-08 PROCEDURE — 97530 THERAPEUTIC ACTIVITIES: CPT

## 2021-05-08 PROCEDURE — 97162 PT EVAL MOD COMPLEX 30 MIN: CPT

## 2021-05-08 PROCEDURE — 99233 SBSQ HOSP IP/OBS HIGH 50: CPT | Mod: GC,,, | Performed by: PSYCHIATRY & NEUROLOGY

## 2021-05-08 PROCEDURE — 25000003 PHARM REV CODE 250: Performed by: STUDENT IN AN ORGANIZED HEALTH CARE EDUCATION/TRAINING PROGRAM

## 2021-05-08 PROCEDURE — 83036 HEMOGLOBIN GLYCOSYLATED A1C: CPT | Performed by: PSYCHIATRY & NEUROLOGY

## 2021-05-08 PROCEDURE — 97165 OT EVAL LOW COMPLEX 30 MIN: CPT

## 2021-05-08 PROCEDURE — 97116 GAIT TRAINING THERAPY: CPT

## 2021-05-08 PROCEDURE — 80053 COMPREHEN METABOLIC PANEL: CPT | Performed by: STUDENT IN AN ORGANIZED HEALTH CARE EDUCATION/TRAINING PROGRAM

## 2021-05-08 PROCEDURE — 99233 PR SUBSEQUENT HOSPITAL CARE,LEVL III: ICD-10-PCS | Mod: GC,,, | Performed by: PSYCHIATRY & NEUROLOGY

## 2021-05-08 PROCEDURE — 25000003 PHARM REV CODE 250: Performed by: PSYCHIATRY & NEUROLOGY

## 2021-05-08 PROCEDURE — 97535 SELF CARE MNGMENT TRAINING: CPT

## 2021-05-08 PROCEDURE — 83735 ASSAY OF MAGNESIUM: CPT | Performed by: STUDENT IN AN ORGANIZED HEALTH CARE EDUCATION/TRAINING PROGRAM

## 2021-05-08 PROCEDURE — 99233 SBSQ HOSP IP/OBS HIGH 50: CPT | Mod: ,,, | Performed by: PSYCHIATRY & NEUROLOGY

## 2021-05-08 PROCEDURE — 99223 1ST HOSP IP/OBS HIGH 75: CPT | Mod: ,,, | Performed by: INTERNAL MEDICINE

## 2021-05-08 RX ORDER — NAPROXEN SODIUM 220 MG/1
81 TABLET, FILM COATED ORAL DAILY
Status: DISCONTINUED | OUTPATIENT
Start: 2021-05-08 | End: 2021-05-12 | Stop reason: HOSPADM

## 2021-05-08 RX ADMIN — ROSUVASTATIN CALCIUM 20 MG: 10 TABLET, FILM COATED ORAL at 09:05

## 2021-05-08 RX ADMIN — DOCUSATE SODIUM AND SENNOSIDES 1 TABLET: 8.6; 5 TABLET, FILM COATED ORAL at 09:05

## 2021-05-08 RX ADMIN — PANTOPRAZOLE SODIUM 40 MG: 40 TABLET, DELAYED RELEASE ORAL at 09:05

## 2021-05-08 RX ADMIN — MEMANTINE HYDROCHLORIDE 5 MG: 5 TABLET ORAL at 09:05

## 2021-05-08 RX ADMIN — SERTRALINE HYDROCHLORIDE 25 MG: 25 TABLET ORAL at 09:05

## 2021-05-08 RX ADMIN — POLYETHYLENE GLYCOL 3350 17 G: 17 POWDER, FOR SOLUTION ORAL at 09:05

## 2021-05-08 RX ADMIN — FUROSEMIDE 20 MG: 20 TABLET ORAL at 09:05

## 2021-05-08 RX ADMIN — ASPIRIN 81 MG CHEWABLE TABLET 81 MG: 81 TABLET CHEWABLE at 02:05

## 2021-05-09 PROBLEM — G93.6 CYTOTOXIC CEREBRAL EDEMA: Status: ACTIVE | Noted: 2021-05-09

## 2021-05-09 LAB
ALBUMIN SERPL BCP-MCNC: 3.1 G/DL (ref 3.5–5.2)
ALP SERPL-CCNC: 156 U/L (ref 55–135)
ALT SERPL W/O P-5'-P-CCNC: 11 U/L (ref 10–44)
ANION GAP SERPL CALC-SCNC: 10 MMOL/L (ref 8–16)
AST SERPL-CCNC: 17 U/L (ref 10–40)
BASOPHILS # BLD AUTO: 0.03 K/UL (ref 0–0.2)
BASOPHILS NFR BLD: 0.5 % (ref 0–1.9)
BILIRUB SERPL-MCNC: 1.2 MG/DL (ref 0.1–1)
BUN SERPL-MCNC: 15 MG/DL (ref 10–30)
CALCIUM SERPL-MCNC: 8.7 MG/DL (ref 8.7–10.5)
CHLORIDE SERPL-SCNC: 103 MMOL/L (ref 95–110)
CO2 SERPL-SCNC: 22 MMOL/L (ref 23–29)
CREAT SERPL-MCNC: 0.7 MG/DL (ref 0.5–1.4)
DIFFERENTIAL METHOD: ABNORMAL
EOSINOPHIL # BLD AUTO: 0.1 K/UL (ref 0–0.5)
EOSINOPHIL NFR BLD: 1.4 % (ref 0–8)
ERYTHROCYTE [DISTWIDTH] IN BLOOD BY AUTOMATED COUNT: 13.6 % (ref 11.5–14.5)
EST. GFR  (AFRICAN AMERICAN): >60 ML/MIN/1.73 M^2
EST. GFR  (NON AFRICAN AMERICAN): >60 ML/MIN/1.73 M^2
GLUCOSE SERPL-MCNC: 101 MG/DL (ref 70–110)
HCT VFR BLD AUTO: 34.4 % (ref 40–54)
HGB BLD-MCNC: 11.3 G/DL (ref 14–18)
IMM GRANULOCYTES # BLD AUTO: 0.01 K/UL (ref 0–0.04)
IMM GRANULOCYTES NFR BLD AUTO: 0.2 % (ref 0–0.5)
LYMPHOCYTES # BLD AUTO: 0.5 K/UL (ref 1–4.8)
LYMPHOCYTES NFR BLD: 9.4 % (ref 18–48)
MAGNESIUM SERPL-MCNC: 2 MG/DL (ref 1.6–2.6)
MCH RBC QN AUTO: 31 PG (ref 27–31)
MCHC RBC AUTO-ENTMCNC: 32.8 G/DL (ref 32–36)
MCV RBC AUTO: 95 FL (ref 82–98)
MONOCYTES # BLD AUTO: 0.7 K/UL (ref 0.3–1)
MONOCYTES NFR BLD: 12.3 % (ref 4–15)
NEUTROPHILS # BLD AUTO: 4.3 K/UL (ref 1.8–7.7)
NEUTROPHILS NFR BLD: 76.2 % (ref 38–73)
NRBC BLD-RTO: 0 /100 WBC
PHOSPHATE SERPL-MCNC: 3.2 MG/DL (ref 2.7–4.5)
PLATELET # BLD AUTO: 93 K/UL (ref 150–450)
PMV BLD AUTO: 9.9 FL (ref 9.2–12.9)
POTASSIUM SERPL-SCNC: 3.6 MMOL/L (ref 3.5–5.1)
PROT SERPL-MCNC: 6 G/DL (ref 6–8.4)
RBC # BLD AUTO: 3.64 M/UL (ref 4.6–6.2)
SODIUM SERPL-SCNC: 135 MMOL/L (ref 136–145)
WBC # BLD AUTO: 5.63 K/UL (ref 3.9–12.7)

## 2021-05-09 PROCEDURE — 99233 SBSQ HOSP IP/OBS HIGH 50: CPT | Mod: ,,, | Performed by: PSYCHIATRY & NEUROLOGY

## 2021-05-09 PROCEDURE — 11000001 HC ACUTE MED/SURG PRIVATE ROOM

## 2021-05-09 PROCEDURE — 99233 PR SUBSEQUENT HOSPITAL CARE,LEVL III: ICD-10-PCS | Mod: ,,, | Performed by: PSYCHIATRY & NEUROLOGY

## 2021-05-09 PROCEDURE — 83735 ASSAY OF MAGNESIUM: CPT | Performed by: STUDENT IN AN ORGANIZED HEALTH CARE EDUCATION/TRAINING PROGRAM

## 2021-05-09 PROCEDURE — 25000003 PHARM REV CODE 250: Performed by: PSYCHIATRY & NEUROLOGY

## 2021-05-09 PROCEDURE — 63600175 PHARM REV CODE 636 W HCPCS: Performed by: PHYSICIAN ASSISTANT

## 2021-05-09 PROCEDURE — 85025 COMPLETE CBC W/AUTO DIFF WBC: CPT | Performed by: STUDENT IN AN ORGANIZED HEALTH CARE EDUCATION/TRAINING PROGRAM

## 2021-05-09 PROCEDURE — 36415 COLL VENOUS BLD VENIPUNCTURE: CPT | Performed by: STUDENT IN AN ORGANIZED HEALTH CARE EDUCATION/TRAINING PROGRAM

## 2021-05-09 PROCEDURE — 80053 COMPREHEN METABOLIC PANEL: CPT | Performed by: STUDENT IN AN ORGANIZED HEALTH CARE EDUCATION/TRAINING PROGRAM

## 2021-05-09 PROCEDURE — 25000003 PHARM REV CODE 250: Performed by: STUDENT IN AN ORGANIZED HEALTH CARE EDUCATION/TRAINING PROGRAM

## 2021-05-09 PROCEDURE — 84100 ASSAY OF PHOSPHORUS: CPT | Performed by: STUDENT IN AN ORGANIZED HEALTH CARE EDUCATION/TRAINING PROGRAM

## 2021-05-09 RX ORDER — HEPARIN SODIUM 5000 [USP'U]/ML
5000 INJECTION, SOLUTION INTRAVENOUS; SUBCUTANEOUS EVERY 8 HOURS
Status: DISCONTINUED | OUTPATIENT
Start: 2021-05-09 | End: 2021-05-12 | Stop reason: HOSPADM

## 2021-05-09 RX ORDER — CLOPIDOGREL BISULFATE 75 MG/1
75 TABLET ORAL DAILY
Status: DISCONTINUED | OUTPATIENT
Start: 2021-05-09 | End: 2021-05-09

## 2021-05-09 RX ADMIN — DOCUSATE SODIUM AND SENNOSIDES 1 TABLET: 8.6; 5 TABLET, FILM COATED ORAL at 09:05

## 2021-05-09 RX ADMIN — SERTRALINE HYDROCHLORIDE 25 MG: 25 TABLET ORAL at 09:05

## 2021-05-09 RX ADMIN — ASPIRIN 81 MG CHEWABLE TABLET 81 MG: 81 TABLET CHEWABLE at 09:05

## 2021-05-09 RX ADMIN — PANTOPRAZOLE SODIUM 40 MG: 40 TABLET, DELAYED RELEASE ORAL at 09:05

## 2021-05-09 RX ADMIN — MEMANTINE HYDROCHLORIDE 5 MG: 5 TABLET ORAL at 09:05

## 2021-05-09 RX ADMIN — FUROSEMIDE 20 MG: 20 TABLET ORAL at 09:05

## 2021-05-09 RX ADMIN — HEPARIN SODIUM 5000 UNITS: 5000 INJECTION INTRAVENOUS; SUBCUTANEOUS at 09:05

## 2021-05-09 RX ADMIN — HEPARIN SODIUM 5000 UNITS: 5000 INJECTION INTRAVENOUS; SUBCUTANEOUS at 02:05

## 2021-05-09 RX ADMIN — POLYETHYLENE GLYCOL 3350 17 G: 17 POWDER, FOR SOLUTION ORAL at 09:05

## 2021-05-10 LAB
ALBUMIN SERPL BCP-MCNC: 3 G/DL (ref 3.5–5.2)
ALP SERPL-CCNC: 152 U/L (ref 55–135)
ALT SERPL W/O P-5'-P-CCNC: 11 U/L (ref 10–44)
ANION GAP SERPL CALC-SCNC: 9 MMOL/L (ref 8–16)
ASCENDING AORTA: 2.52 CM
AST SERPL-CCNC: 19 U/L (ref 10–40)
AV INDEX (PROSTH): 0.45
AV MEAN GRADIENT: 5 MMHG
AV PEAK GRADIENT: 8 MMHG
AV VALVE AREA: 1.71 CM2
AV VELOCITY RATIO: 0.44
BASOPHILS # BLD AUTO: 0.03 K/UL (ref 0–0.2)
BASOPHILS NFR BLD: 0.6 % (ref 0–1.9)
BILIRUB SERPL-MCNC: 1.1 MG/DL (ref 0.1–1)
BSA FOR ECHO PROCEDURE: 1.89 M2
BUN SERPL-MCNC: 13 MG/DL (ref 10–30)
CALCIUM SERPL-MCNC: 8.4 MG/DL (ref 8.7–10.5)
CHLORIDE SERPL-SCNC: 101 MMOL/L (ref 95–110)
CO2 SERPL-SCNC: 26 MMOL/L (ref 23–29)
CREAT SERPL-MCNC: 0.7 MG/DL (ref 0.5–1.4)
CV ECHO LV RWT: 0.4 CM
DIFFERENTIAL METHOD: ABNORMAL
DOP CALC AO PEAK VEL: 1.4 M/S
DOP CALC AO VTI: 25.09 CM
DOP CALC LVOT AREA: 3.8 CM2
DOP CALC LVOT DIAMETER: 2.19 CM
DOP CALC LVOT PEAK VEL: 0.62 M/S
DOP CALC LVOT STROKE VOLUME: 42.88 CM3
DOP CALCLVOT PEAK VEL VTI: 11.39 CM
E/E' RATIO: 13.83 M/S
ECHO LV POSTERIOR WALL: 0.9 CM (ref 0.6–1.1)
EJECTION FRACTION: 35 %
EOSINOPHIL # BLD AUTO: 0.1 K/UL (ref 0–0.5)
EOSINOPHIL NFR BLD: 2.4 % (ref 0–8)
ERYTHROCYTE [DISTWIDTH] IN BLOOD BY AUTOMATED COUNT: 13.5 % (ref 11.5–14.5)
EST. GFR  (AFRICAN AMERICAN): >60 ML/MIN/1.73 M^2
EST. GFR  (NON AFRICAN AMERICAN): >60 ML/MIN/1.73 M^2
FRACTIONAL SHORTENING: 32 % (ref 28–44)
GLUCOSE SERPL-MCNC: 88 MG/DL (ref 70–110)
HCT VFR BLD AUTO: 32.7 % (ref 40–54)
HGB BLD-MCNC: 11 G/DL (ref 14–18)
IMM GRANULOCYTES # BLD AUTO: 0.01 K/UL (ref 0–0.04)
IMM GRANULOCYTES NFR BLD AUTO: 0.2 % (ref 0–0.5)
INTERVENTRICULAR SEPTUM: 1.27 CM (ref 0.6–1.1)
LA MAJOR: 6.14 CM
LA MINOR: 6 CM
LA WIDTH: 4.72 CM
LEFT ATRIUM SIZE: 4.38 CM
LEFT ATRIUM VOLUME INDEX MOD: 49.9 ML/M2
LEFT ATRIUM VOLUME INDEX: 57 ML/M2
LEFT ATRIUM VOLUME MOD: 93.35 CM3
LEFT ATRIUM VOLUME: 106.65 CM3
LEFT INTERNAL DIMENSION IN SYSTOLE: 3.07 CM (ref 2.1–4)
LEFT VENTRICLE DIASTOLIC VOLUME INDEX: 49.94 ML/M2
LEFT VENTRICLE DIASTOLIC VOLUME: 93.39 ML
LEFT VENTRICLE MASS INDEX: 92 G/M2
LEFT VENTRICLE SYSTOLIC VOLUME INDEX: 19.8 ML/M2
LEFT VENTRICLE SYSTOLIC VOLUME: 36.94 ML
LEFT VENTRICULAR INTERNAL DIMENSION IN DIASTOLE: 4.52 CM (ref 3.5–6)
LEFT VENTRICULAR MASS: 172.88 G
LV LATERAL E/E' RATIO: 10.38 M/S
LV SEPTAL E/E' RATIO: 20.75 M/S
LYMPHOCYTES # BLD AUTO: 0.6 K/UL (ref 1–4.8)
LYMPHOCYTES NFR BLD: 12.2 % (ref 18–48)
MAGNESIUM SERPL-MCNC: 2 MG/DL (ref 1.6–2.6)
MCH RBC QN AUTO: 31 PG (ref 27–31)
MCHC RBC AUTO-ENTMCNC: 33.6 G/DL (ref 32–36)
MCV RBC AUTO: 92 FL (ref 82–98)
MONOCYTES # BLD AUTO: 0.6 K/UL (ref 0.3–1)
MONOCYTES NFR BLD: 12 % (ref 4–15)
MV PEAK E VEL: 0.83 M/S
NEUTROPHILS # BLD AUTO: 3.6 K/UL (ref 1.8–7.7)
NEUTROPHILS NFR BLD: 72.6 % (ref 38–73)
NRBC BLD-RTO: 0 /100 WBC
PHOSPHATE SERPL-MCNC: 3 MG/DL (ref 2.7–4.5)
PISA TR MAX VEL: 2.9 M/S
PLATELET # BLD AUTO: 101 K/UL (ref 150–450)
PMV BLD AUTO: 10.7 FL (ref 9.2–12.9)
POTASSIUM SERPL-SCNC: 3.4 MMOL/L (ref 3.5–5.1)
PROT SERPL-MCNC: 5.9 G/DL (ref 6–8.4)
RA MAJOR: 5.59 CM
RA PRESSURE: 8 MMHG
RA WIDTH: 4.33 CM
RBC # BLD AUTO: 3.55 M/UL (ref 4.6–6.2)
RIGHT VENTRICULAR END-DIASTOLIC DIMENSION: 4.15 CM
RV TISSUE DOPPLER FREE WALL SYSTOLIC VELOCITY 1 (APICAL 4 CHAMBER VIEW): 4.43 CM/S
SINUS: 3.12 CM
SODIUM SERPL-SCNC: 136 MMOL/L (ref 136–145)
STJ: 2.56 CM
TDI LATERAL: 0.08 M/S
TDI SEPTAL: 0.04 M/S
TDI: 0.06 M/S
TR MAX PG: 34 MMHG
TRICUSPID ANNULAR PLANE SYSTOLIC EXCURSION: 0.67 CM
TV REST PULMONARY ARTERY PRESSURE: 42 MMHG
WBC # BLD AUTO: 4.98 K/UL (ref 3.9–12.7)

## 2021-05-10 PROCEDURE — 80053 COMPREHEN METABOLIC PANEL: CPT | Performed by: STUDENT IN AN ORGANIZED HEALTH CARE EDUCATION/TRAINING PROGRAM

## 2021-05-10 PROCEDURE — 99222 PR INITIAL HOSPITAL CARE,LEVL II: ICD-10-PCS | Mod: ,,, | Performed by: NURSE PRACTITIONER

## 2021-05-10 PROCEDURE — 30200315 PPD INTRADERMAL TEST REV CODE 302: Performed by: STUDENT IN AN ORGANIZED HEALTH CARE EDUCATION/TRAINING PROGRAM

## 2021-05-10 PROCEDURE — 99222 1ST HOSP IP/OBS MODERATE 55: CPT | Mod: ,,, | Performed by: NURSE PRACTITIONER

## 2021-05-10 PROCEDURE — 84100 ASSAY OF PHOSPHORUS: CPT | Performed by: STUDENT IN AN ORGANIZED HEALTH CARE EDUCATION/TRAINING PROGRAM

## 2021-05-10 PROCEDURE — 85025 COMPLETE CBC W/AUTO DIFF WBC: CPT | Performed by: STUDENT IN AN ORGANIZED HEALTH CARE EDUCATION/TRAINING PROGRAM

## 2021-05-10 PROCEDURE — 25000003 PHARM REV CODE 250: Performed by: PSYCHIATRY & NEUROLOGY

## 2021-05-10 PROCEDURE — 99233 PR SUBSEQUENT HOSPITAL CARE,LEVL III: ICD-10-PCS | Mod: GC,,, | Performed by: PSYCHIATRY & NEUROLOGY

## 2021-05-10 PROCEDURE — 97535 SELF CARE MNGMENT TRAINING: CPT

## 2021-05-10 PROCEDURE — 63600175 PHARM REV CODE 636 W HCPCS: Performed by: PHYSICIAN ASSISTANT

## 2021-05-10 PROCEDURE — 99233 SBSQ HOSP IP/OBS HIGH 50: CPT | Mod: GC,,, | Performed by: PSYCHIATRY & NEUROLOGY

## 2021-05-10 PROCEDURE — 36415 COLL VENOUS BLD VENIPUNCTURE: CPT | Performed by: STUDENT IN AN ORGANIZED HEALTH CARE EDUCATION/TRAINING PROGRAM

## 2021-05-10 PROCEDURE — 92526 ORAL FUNCTION THERAPY: CPT

## 2021-05-10 PROCEDURE — 86580 TB INTRADERMAL TEST: CPT | Performed by: STUDENT IN AN ORGANIZED HEALTH CARE EDUCATION/TRAINING PROGRAM

## 2021-05-10 PROCEDURE — 83735 ASSAY OF MAGNESIUM: CPT | Performed by: STUDENT IN AN ORGANIZED HEALTH CARE EDUCATION/TRAINING PROGRAM

## 2021-05-10 PROCEDURE — 25000003 PHARM REV CODE 250: Performed by: STUDENT IN AN ORGANIZED HEALTH CARE EDUCATION/TRAINING PROGRAM

## 2021-05-10 PROCEDURE — 11000001 HC ACUTE MED/SURG PRIVATE ROOM

## 2021-05-10 RX ADMIN — HEPARIN SODIUM 5000 UNITS: 5000 INJECTION INTRAVENOUS; SUBCUTANEOUS at 06:05

## 2021-05-10 RX ADMIN — HEPARIN SODIUM 5000 UNITS: 5000 INJECTION INTRAVENOUS; SUBCUTANEOUS at 03:05

## 2021-05-10 RX ADMIN — HEPARIN SODIUM 5000 UNITS: 5000 INJECTION INTRAVENOUS; SUBCUTANEOUS at 09:05

## 2021-05-10 RX ADMIN — MEMANTINE HYDROCHLORIDE 5 MG: 5 TABLET ORAL at 09:05

## 2021-05-10 RX ADMIN — ASPIRIN 81 MG CHEWABLE TABLET 81 MG: 81 TABLET CHEWABLE at 09:05

## 2021-05-10 RX ADMIN — POLYETHYLENE GLYCOL 3350 17 G: 17 POWDER, FOR SOLUTION ORAL at 09:05

## 2021-05-10 RX ADMIN — PANTOPRAZOLE SODIUM 40 MG: 40 TABLET, DELAYED RELEASE ORAL at 09:05

## 2021-05-10 RX ADMIN — DOCUSATE SODIUM AND SENNOSIDES 1 TABLET: 8.6; 5 TABLET, FILM COATED ORAL at 09:05

## 2021-05-10 RX ADMIN — ROSUVASTATIN CALCIUM 20 MG: 10 TABLET, FILM COATED ORAL at 09:05

## 2021-05-10 RX ADMIN — SERTRALINE HYDROCHLORIDE 25 MG: 25 TABLET ORAL at 09:05

## 2021-05-10 RX ADMIN — TUBERCULIN PURIFIED PROTEIN DERIVATIVE 5 UNITS: 5 INJECTION, SOLUTION INTRADERMAL at 06:05

## 2021-05-11 PROBLEM — I50.30 (HFPEF) HEART FAILURE WITH PRESERVED EJECTION FRACTION: Status: ACTIVE | Noted: 2021-05-11

## 2021-05-11 LAB
ALBUMIN SERPL BCP-MCNC: 2.9 G/DL (ref 3.5–5.2)
ALP SERPL-CCNC: 146 U/L (ref 55–135)
ALT SERPL W/O P-5'-P-CCNC: 12 U/L (ref 10–44)
ANION GAP SERPL CALC-SCNC: 10 MMOL/L (ref 8–16)
AST SERPL-CCNC: 20 U/L (ref 10–40)
BASOPHILS # BLD AUTO: 0.04 K/UL (ref 0–0.2)
BASOPHILS NFR BLD: 0.9 % (ref 0–1.9)
BILIRUB SERPL-MCNC: 1 MG/DL (ref 0.1–1)
BUN SERPL-MCNC: 12 MG/DL (ref 10–30)
CALCIUM SERPL-MCNC: 8.8 MG/DL (ref 8.7–10.5)
CHLORIDE SERPL-SCNC: 103 MMOL/L (ref 95–110)
CO2 SERPL-SCNC: 24 MMOL/L (ref 23–29)
CREAT SERPL-MCNC: 0.7 MG/DL (ref 0.5–1.4)
DIFFERENTIAL METHOD: ABNORMAL
EOSINOPHIL # BLD AUTO: 0.2 K/UL (ref 0–0.5)
EOSINOPHIL NFR BLD: 4.7 % (ref 0–8)
ERYTHROCYTE [DISTWIDTH] IN BLOOD BY AUTOMATED COUNT: 13.4 % (ref 11.5–14.5)
EST. GFR  (AFRICAN AMERICAN): >60 ML/MIN/1.73 M^2
EST. GFR  (NON AFRICAN AMERICAN): >60 ML/MIN/1.73 M^2
GLUCOSE SERPL-MCNC: 92 MG/DL (ref 70–110)
HCT VFR BLD AUTO: 34.8 % (ref 40–54)
HGB BLD-MCNC: 11.6 G/DL (ref 14–18)
IMM GRANULOCYTES # BLD AUTO: 0.01 K/UL (ref 0–0.04)
IMM GRANULOCYTES NFR BLD AUTO: 0.2 % (ref 0–0.5)
LYMPHOCYTES # BLD AUTO: 0.6 K/UL (ref 1–4.8)
LYMPHOCYTES NFR BLD: 13.9 % (ref 18–48)
MAGNESIUM SERPL-MCNC: 2 MG/DL (ref 1.6–2.6)
MCH RBC QN AUTO: 31.1 PG (ref 27–31)
MCHC RBC AUTO-ENTMCNC: 33.3 G/DL (ref 32–36)
MCV RBC AUTO: 93 FL (ref 82–98)
MONOCYTES # BLD AUTO: 0.6 K/UL (ref 0.3–1)
MONOCYTES NFR BLD: 12.9 % (ref 4–15)
NEUTROPHILS # BLD AUTO: 2.9 K/UL (ref 1.8–7.7)
NEUTROPHILS NFR BLD: 67.4 % (ref 38–73)
NRBC BLD-RTO: 0 /100 WBC
PHOSPHATE SERPL-MCNC: 3 MG/DL (ref 2.7–4.5)
PLATELET # BLD AUTO: 106 K/UL (ref 150–450)
PMV BLD AUTO: 9.9 FL (ref 9.2–12.9)
POTASSIUM SERPL-SCNC: 3.4 MMOL/L (ref 3.5–5.1)
PROT SERPL-MCNC: 5.8 G/DL (ref 6–8.4)
RBC # BLD AUTO: 3.73 M/UL (ref 4.6–6.2)
SODIUM SERPL-SCNC: 137 MMOL/L (ref 136–145)
WBC # BLD AUTO: 4.25 K/UL (ref 3.9–12.7)

## 2021-05-11 PROCEDURE — 25000003 PHARM REV CODE 250: Performed by: PSYCHIATRY & NEUROLOGY

## 2021-05-11 PROCEDURE — 84100 ASSAY OF PHOSPHORUS: CPT | Performed by: STUDENT IN AN ORGANIZED HEALTH CARE EDUCATION/TRAINING PROGRAM

## 2021-05-11 PROCEDURE — 25000003 PHARM REV CODE 250: Performed by: STUDENT IN AN ORGANIZED HEALTH CARE EDUCATION/TRAINING PROGRAM

## 2021-05-11 PROCEDURE — 80053 COMPREHEN METABOLIC PANEL: CPT | Performed by: STUDENT IN AN ORGANIZED HEALTH CARE EDUCATION/TRAINING PROGRAM

## 2021-05-11 PROCEDURE — 99233 PR SUBSEQUENT HOSPITAL CARE,LEVL III: ICD-10-PCS | Mod: GC,,, | Performed by: PSYCHIATRY & NEUROLOGY

## 2021-05-11 PROCEDURE — 83735 ASSAY OF MAGNESIUM: CPT | Performed by: STUDENT IN AN ORGANIZED HEALTH CARE EDUCATION/TRAINING PROGRAM

## 2021-05-11 PROCEDURE — 99233 SBSQ HOSP IP/OBS HIGH 50: CPT | Mod: GC,,, | Performed by: PSYCHIATRY & NEUROLOGY

## 2021-05-11 PROCEDURE — 97530 THERAPEUTIC ACTIVITIES: CPT

## 2021-05-11 PROCEDURE — 36415 COLL VENOUS BLD VENIPUNCTURE: CPT | Performed by: STUDENT IN AN ORGANIZED HEALTH CARE EDUCATION/TRAINING PROGRAM

## 2021-05-11 PROCEDURE — 11000001 HC ACUTE MED/SURG PRIVATE ROOM

## 2021-05-11 PROCEDURE — 85025 COMPLETE CBC W/AUTO DIFF WBC: CPT | Performed by: STUDENT IN AN ORGANIZED HEALTH CARE EDUCATION/TRAINING PROGRAM

## 2021-05-11 PROCEDURE — 25000003 PHARM REV CODE 250: Performed by: NURSE PRACTITIONER

## 2021-05-11 PROCEDURE — 97116 GAIT TRAINING THERAPY: CPT

## 2021-05-11 PROCEDURE — 63600175 PHARM REV CODE 636 W HCPCS: Performed by: PHYSICIAN ASSISTANT

## 2021-05-11 PROCEDURE — 97110 THERAPEUTIC EXERCISES: CPT

## 2021-05-11 RX ADMIN — HEPARIN SODIUM 5000 UNITS: 5000 INJECTION INTRAVENOUS; SUBCUTANEOUS at 03:05

## 2021-05-11 RX ADMIN — MEMANTINE HYDROCHLORIDE 5 MG: 5 TABLET ORAL at 09:05

## 2021-05-11 RX ADMIN — HEPARIN SODIUM 5000 UNITS: 5000 INJECTION INTRAVENOUS; SUBCUTANEOUS at 06:05

## 2021-05-11 RX ADMIN — DOCUSATE SODIUM AND SENNOSIDES 1 TABLET: 8.6; 5 TABLET, FILM COATED ORAL at 08:05

## 2021-05-11 RX ADMIN — POTASSIUM BICARBONATE 20 MEQ: 391 TABLET, EFFERVESCENT ORAL at 01:05

## 2021-05-11 RX ADMIN — HEPARIN SODIUM 5000 UNITS: 5000 INJECTION INTRAVENOUS; SUBCUTANEOUS at 11:05

## 2021-05-11 RX ADMIN — MICONAZOLE NITRATE: 20 OINTMENT TOPICAL at 09:05

## 2021-05-11 RX ADMIN — DOCUSATE SODIUM AND SENNOSIDES 1 TABLET: 8.6; 5 TABLET, FILM COATED ORAL at 09:05

## 2021-05-11 RX ADMIN — POLYETHYLENE GLYCOL 3350 17 G: 17 POWDER, FOR SOLUTION ORAL at 08:05

## 2021-05-11 RX ADMIN — ASPIRIN 81 MG CHEWABLE TABLET 81 MG: 81 TABLET CHEWABLE at 08:05

## 2021-05-11 RX ADMIN — ROSUVASTATIN CALCIUM 20 MG: 10 TABLET, FILM COATED ORAL at 08:05

## 2021-05-11 RX ADMIN — FUROSEMIDE 20 MG: 20 TABLET ORAL at 08:05

## 2021-05-11 RX ADMIN — PANTOPRAZOLE SODIUM 40 MG: 40 TABLET, DELAYED RELEASE ORAL at 08:05

## 2021-05-11 RX ADMIN — SERTRALINE HYDROCHLORIDE 25 MG: 25 TABLET ORAL at 08:05

## 2021-05-11 RX ADMIN — MICONAZOLE NITRATE: 20 OINTMENT TOPICAL at 12:05

## 2021-05-11 RX ADMIN — MEMANTINE HYDROCHLORIDE 5 MG: 5 TABLET ORAL at 08:05

## 2021-05-12 VITALS
RESPIRATION RATE: 18 BRPM | OXYGEN SATURATION: 96 % | HEART RATE: 61 BPM | HEIGHT: 67 IN | WEIGHT: 167 LBS | TEMPERATURE: 98 F | SYSTOLIC BLOOD PRESSURE: 137 MMHG | BODY MASS INDEX: 26.21 KG/M2 | DIASTOLIC BLOOD PRESSURE: 73 MMHG

## 2021-05-12 LAB
ALBUMIN SERPL BCP-MCNC: 3.1 G/DL (ref 3.5–5.2)
ALP SERPL-CCNC: 216 U/L (ref 55–135)
ALT SERPL W/O P-5'-P-CCNC: 67 U/L (ref 10–44)
ANION GAP SERPL CALC-SCNC: 7 MMOL/L (ref 8–16)
AST SERPL-CCNC: 100 U/L (ref 10–40)
BASOPHILS # BLD AUTO: 0.03 K/UL (ref 0–0.2)
BASOPHILS NFR BLD: 0.5 % (ref 0–1.9)
BILIRUB SERPL-MCNC: 1.3 MG/DL (ref 0.1–1)
BUN SERPL-MCNC: 14 MG/DL (ref 10–30)
CALCIUM SERPL-MCNC: 9 MG/DL (ref 8.7–10.5)
CHLORIDE SERPL-SCNC: 101 MMOL/L (ref 95–110)
CO2 SERPL-SCNC: 29 MMOL/L (ref 23–29)
CREAT SERPL-MCNC: 0.8 MG/DL (ref 0.5–1.4)
DIFFERENTIAL METHOD: ABNORMAL
EOSINOPHIL # BLD AUTO: 0.1 K/UL (ref 0–0.5)
EOSINOPHIL NFR BLD: 2.2 % (ref 0–8)
ERYTHROCYTE [DISTWIDTH] IN BLOOD BY AUTOMATED COUNT: 13.5 % (ref 11.5–14.5)
EST. GFR  (AFRICAN AMERICAN): >60 ML/MIN/1.73 M^2
EST. GFR  (NON AFRICAN AMERICAN): >60 ML/MIN/1.73 M^2
GLUCOSE SERPL-MCNC: 92 MG/DL (ref 70–110)
HCT VFR BLD AUTO: 34.5 % (ref 40–54)
HGB BLD-MCNC: 11.4 G/DL (ref 14–18)
IMM GRANULOCYTES # BLD AUTO: 0.01 K/UL (ref 0–0.04)
IMM GRANULOCYTES NFR BLD AUTO: 0.2 % (ref 0–0.5)
LYMPHOCYTES # BLD AUTO: 0.6 K/UL (ref 1–4.8)
LYMPHOCYTES NFR BLD: 10.9 % (ref 18–48)
MAGNESIUM SERPL-MCNC: 2 MG/DL (ref 1.6–2.6)
MCH RBC QN AUTO: 31.7 PG (ref 27–31)
MCHC RBC AUTO-ENTMCNC: 33 G/DL (ref 32–36)
MCV RBC AUTO: 96 FL (ref 82–98)
MONOCYTES # BLD AUTO: 0.5 K/UL (ref 0.3–1)
MONOCYTES NFR BLD: 9 % (ref 4–15)
NEUTROPHILS # BLD AUTO: 4.3 K/UL (ref 1.8–7.7)
NEUTROPHILS NFR BLD: 77.2 % (ref 38–73)
NRBC BLD-RTO: 0 /100 WBC
PHOSPHATE SERPL-MCNC: 3.4 MG/DL (ref 2.7–4.5)
PLATELET # BLD AUTO: 133 K/UL (ref 150–450)
PMV BLD AUTO: 10.3 FL (ref 9.2–12.9)
POTASSIUM SERPL-SCNC: 3.4 MMOL/L (ref 3.5–5.1)
PROT SERPL-MCNC: 6 G/DL (ref 6–8.4)
RBC # BLD AUTO: 3.6 M/UL (ref 4.6–6.2)
SODIUM SERPL-SCNC: 137 MMOL/L (ref 136–145)
WBC # BLD AUTO: 5.58 K/UL (ref 3.9–12.7)

## 2021-05-12 PROCEDURE — 94761 N-INVAS EAR/PLS OXIMETRY MLT: CPT

## 2021-05-12 PROCEDURE — 99238 HOSP IP/OBS DSCHRG MGMT 30/<: CPT | Mod: ,,, | Performed by: PSYCHIATRY & NEUROLOGY

## 2021-05-12 PROCEDURE — 84100 ASSAY OF PHOSPHORUS: CPT | Performed by: STUDENT IN AN ORGANIZED HEALTH CARE EDUCATION/TRAINING PROGRAM

## 2021-05-12 PROCEDURE — 25000003 PHARM REV CODE 250: Performed by: STUDENT IN AN ORGANIZED HEALTH CARE EDUCATION/TRAINING PROGRAM

## 2021-05-12 PROCEDURE — 25000003 PHARM REV CODE 250: Performed by: PSYCHIATRY & NEUROLOGY

## 2021-05-12 PROCEDURE — 36415 COLL VENOUS BLD VENIPUNCTURE: CPT | Performed by: STUDENT IN AN ORGANIZED HEALTH CARE EDUCATION/TRAINING PROGRAM

## 2021-05-12 PROCEDURE — 63600175 PHARM REV CODE 636 W HCPCS: Performed by: PHYSICIAN ASSISTANT

## 2021-05-12 PROCEDURE — 97535 SELF CARE MNGMENT TRAINING: CPT

## 2021-05-12 PROCEDURE — 85025 COMPLETE CBC W/AUTO DIFF WBC: CPT | Performed by: STUDENT IN AN ORGANIZED HEALTH CARE EDUCATION/TRAINING PROGRAM

## 2021-05-12 PROCEDURE — 99238 PR HOSPITAL DISCHARGE DAY,<30 MIN: ICD-10-PCS | Mod: ,,, | Performed by: PSYCHIATRY & NEUROLOGY

## 2021-05-12 PROCEDURE — 80053 COMPREHEN METABOLIC PANEL: CPT | Performed by: STUDENT IN AN ORGANIZED HEALTH CARE EDUCATION/TRAINING PROGRAM

## 2021-05-12 PROCEDURE — 83735 ASSAY OF MAGNESIUM: CPT | Performed by: STUDENT IN AN ORGANIZED HEALTH CARE EDUCATION/TRAINING PROGRAM

## 2021-05-12 RX ORDER — ROPIVACAINE HYDROCHLORIDE 5 MG/ML
INJECTION, SOLUTION EPIDURAL; INFILTRATION; PERINEURAL
Status: DISCONTINUED
Start: 2021-05-12 | End: 2021-05-12 | Stop reason: HOSPADM

## 2021-05-12 RX ORDER — NAPROXEN SODIUM 220 MG/1
81 TABLET, FILM COATED ORAL DAILY
Qty: 360 TABLET | Refills: 0 | Status: SHIPPED | OUTPATIENT
Start: 2021-05-13 | End: 2022-05-13

## 2021-05-12 RX ORDER — AMOXICILLIN 250 MG
1 CAPSULE ORAL 2 TIMES DAILY
Qty: 60 TABLET | Refills: 0 | Status: SHIPPED | OUTPATIENT
Start: 2021-05-12 | End: 2021-06-11

## 2021-05-12 RX ORDER — METOPROLOL SUCCINATE 50 MG/1
50 TABLET, EXTENDED RELEASE ORAL DAILY
Qty: 90 TABLET | Refills: 0 | Status: SHIPPED | OUTPATIENT
Start: 2021-05-12

## 2021-05-12 RX ADMIN — HEPARIN SODIUM 5000 UNITS: 5000 INJECTION INTRAVENOUS; SUBCUTANEOUS at 07:05

## 2021-05-12 RX ADMIN — SERTRALINE HYDROCHLORIDE 25 MG: 25 TABLET ORAL at 08:05

## 2021-05-12 RX ADMIN — FUROSEMIDE 20 MG: 20 TABLET ORAL at 08:05

## 2021-05-12 RX ADMIN — POLYETHYLENE GLYCOL 3350 17 G: 17 POWDER, FOR SOLUTION ORAL at 08:05

## 2021-05-12 RX ADMIN — PANTOPRAZOLE SODIUM 40 MG: 40 TABLET, DELAYED RELEASE ORAL at 08:05

## 2021-05-12 RX ADMIN — DOCUSATE SODIUM AND SENNOSIDES 1 TABLET: 8.6; 5 TABLET, FILM COATED ORAL at 08:05

## 2021-05-12 RX ADMIN — ASPIRIN 81 MG CHEWABLE TABLET 81 MG: 81 TABLET CHEWABLE at 08:05

## 2021-05-12 RX ADMIN — MICONAZOLE NITRATE: 20 OINTMENT TOPICAL at 09:05

## 2021-05-12 RX ADMIN — MEMANTINE HYDROCHLORIDE 5 MG: 5 TABLET ORAL at 08:05

## 2021-05-12 RX ADMIN — ROSUVASTATIN CALCIUM 20 MG: 10 TABLET, FILM COATED ORAL at 08:05

## 2021-05-12 RX ADMIN — POTASSIUM BICARBONATE 20 MEQ: 391 TABLET, EFFERVESCENT ORAL at 10:05

## 2021-06-01 ENCOUNTER — TELEPHONE (OUTPATIENT)
Dept: INTERNAL MEDICINE | Facility: CLINIC | Age: 86
End: 2021-06-01